# Patient Record
Sex: FEMALE | Race: WHITE | NOT HISPANIC OR LATINO
[De-identification: names, ages, dates, MRNs, and addresses within clinical notes are randomized per-mention and may not be internally consistent; named-entity substitution may affect disease eponyms.]

---

## 2017-06-15 ENCOUNTER — LABORATORY RESULT (OUTPATIENT)
Age: 52
End: 2017-06-15

## 2017-06-15 ENCOUNTER — APPOINTMENT (OUTPATIENT)
Dept: RHEUMATOLOGY | Facility: CLINIC | Age: 52
End: 2017-06-15

## 2017-06-15 VITALS
DIASTOLIC BLOOD PRESSURE: 80 MMHG | SYSTOLIC BLOOD PRESSURE: 122 MMHG | OXYGEN SATURATION: 99 % | TEMPERATURE: 98.3 F | HEART RATE: 65 BPM | HEIGHT: 66 IN

## 2017-06-15 DIAGNOSIS — Z82.61 FAMILY HISTORY OF ARTHRITIS: ICD-10-CM

## 2017-06-15 DIAGNOSIS — Z86.39 PERSONAL HISTORY OF OTHER ENDOCRINE, NUTRITIONAL AND METABOLIC DISEASE: ICD-10-CM

## 2017-06-15 DIAGNOSIS — Z78.9 OTHER SPECIFIED HEALTH STATUS: ICD-10-CM

## 2017-06-15 RX ORDER — OMEPRAZOLE 20 MG/1
20 CAPSULE, DELAYED RELEASE ORAL
Qty: 30 | Refills: 0 | Status: ACTIVE | COMMUNITY
Start: 2017-03-19

## 2017-06-16 LAB
ALBUMIN SERPL ELPH-MCNC: 4.5 G/DL
ALP BLD-CCNC: 54 U/L
ALT SERPL-CCNC: 15 U/L
ANION GAP SERPL CALC-SCNC: 15 MMOL/L
APPEARANCE: ABNORMAL
AST SERPL-CCNC: 17 U/L
BASOPHILS # BLD AUTO: 0.03 K/UL
BASOPHILS NFR BLD AUTO: 0.5 %
BILIRUB SERPL-MCNC: 0.2 MG/DL
BILIRUBIN URINE: NEGATIVE
BLOOD URINE: NEGATIVE
BUN SERPL-MCNC: 22 MG/DL
CALCIUM SERPL-MCNC: 8.9 MG/DL
CCP AB SER IA-ACNC: <8 UNITS
CHLORIDE SERPL-SCNC: 105 MMOL/L
CO2 SERPL-SCNC: 27 MMOL/L
COLOR: YELLOW
CREAT SERPL-MCNC: 0.73 MG/DL
CRP SERPL-MCNC: 0.2 MG/DL
DSDNA AB SER-ACNC: <12 IU/ML
ENA RNP AB SER IA-ACNC: <0.2 AL
ENA SM AB SER IA-ACNC: <0.2 AL
ENA SS-A AB SER IA-ACNC: <0.2 AL
ENA SS-B AB SER IA-ACNC: <0.2 AL
EOSINOPHIL # BLD AUTO: 0.19 K/UL
EOSINOPHIL NFR BLD AUTO: 2.9 %
ERYTHROCYTE [SEDIMENTATION RATE] IN BLOOD BY WESTERGREN METHOD: 12 MM/HR
GLUCOSE QUALITATIVE U: NORMAL MG/DL
GLUCOSE SERPL-MCNC: 85 MG/DL
HBV CORE IGG+IGM SER QL: NONREACTIVE
HBV SURFACE AB SERPL IA-ACNC: <3 MIU/ML
HBV SURFACE AG SER QL: NONREACTIVE
HCT VFR BLD CALC: 41 %
HCV AB SER QL: NONREACTIVE
HCV S/CO RATIO: 0.08 S/CO
HGB BLD-MCNC: 13 G/DL
IMM GRANULOCYTES NFR BLD AUTO: 0 %
KETONES URINE: NEGATIVE
LEUKOCYTE ESTERASE URINE: NEGATIVE
LYMPHOCYTES # BLD AUTO: 1.81 K/UL
LYMPHOCYTES NFR BLD AUTO: 27.5 %
MAN DIFF?: NORMAL
MCHC RBC-ENTMCNC: 28.4 PG
MCHC RBC-ENTMCNC: 31.7 GM/DL
MCV RBC AUTO: 89.7 FL
MONOCYTES # BLD AUTO: 0.43 K/UL
MONOCYTES NFR BLD AUTO: 6.5 %
NEUTROPHILS # BLD AUTO: 4.12 K/UL
NEUTROPHILS NFR BLD AUTO: 62.6 %
NITRITE URINE: NEGATIVE
PH URINE: 5
PLATELET # BLD AUTO: 255 K/UL
POTASSIUM SERPL-SCNC: 4.2 MMOL/L
PROT SERPL-MCNC: 7.5 G/DL
PROTEIN URINE: ABNORMAL MG/DL
RBC # BLD: 4.57 M/UL
RBC # FLD: 16.1 %
RF+CCP IGG SER-IMP: NEGATIVE
RHEUMATOID FACT SER QL: 16 IU/ML
SODIUM SERPL-SCNC: 147 MMOL/L
SPECIFIC GRAVITY URINE: 1.03
UROBILINOGEN URINE: NORMAL MG/DL
WBC # FLD AUTO: 6.58 K/UL

## 2017-06-18 LAB
ADJUSTED MITOGEN: >10 IU/ML
ADJUSTED TB AG: 0.02 IU/ML
ANA SER IF-ACNC: NEGATIVE
C3 SERPL-MCNC: 168 MG/DL
C4 SERPL-MCNC: 27 MG/DL
M TB IFN-G BLD-IMP: NORMAL
QUANTIFERON GOLD NIL: 0.04 IU/ML
THYROGLOB AB SERPL-ACNC: <20 IU/ML
THYROPEROXIDASE AB SERPL IA-ACNC: <10 IU/ML

## 2017-08-22 ENCOUNTER — MEDICATION RENEWAL (OUTPATIENT)
Age: 52
End: 2017-08-22

## 2017-09-14 ENCOUNTER — APPOINTMENT (OUTPATIENT)
Dept: RHEUMATOLOGY | Facility: CLINIC | Age: 52
End: 2017-09-14
Payer: COMMERCIAL

## 2017-09-14 ENCOUNTER — LABORATORY RESULT (OUTPATIENT)
Age: 52
End: 2017-09-14

## 2017-09-14 VITALS
SYSTOLIC BLOOD PRESSURE: 136 MMHG | BODY MASS INDEX: 33.43 KG/M2 | HEIGHT: 66 IN | HEART RATE: 66 BPM | WEIGHT: 208 LBS | DIASTOLIC BLOOD PRESSURE: 87 MMHG | OXYGEN SATURATION: 97 %

## 2017-09-14 PROCEDURE — 99214 OFFICE O/P EST MOD 30 MIN: CPT

## 2017-09-15 LAB
25(OH)D3 SERPL-MCNC: 59.7 NG/ML
ALBUMIN SERPL ELPH-MCNC: 4.5 G/DL
ALP BLD-CCNC: 50 U/L
ALT SERPL-CCNC: 17 U/L
ANION GAP SERPL CALC-SCNC: 17 MMOL/L
APPEARANCE: CLEAR
AST SERPL-CCNC: 24 U/L
BASOPHILS # BLD AUTO: 0.06 K/UL
BASOPHILS NFR BLD AUTO: 0.9 %
BILIRUB SERPL-MCNC: 0.2 MG/DL
BILIRUBIN URINE: NEGATIVE
BLOOD URINE: NEGATIVE
BUN SERPL-MCNC: 25 MG/DL
CALCIUM SERPL-MCNC: 9.8 MG/DL
CHLORIDE SERPL-SCNC: 99 MMOL/L
CO2 SERPL-SCNC: 29 MMOL/L
COLOR: YELLOW
CREAT SERPL-MCNC: 0.74 MG/DL
CREAT SPEC-SCNC: 140 MG/DL
CREAT/PROT UR: 0 RATIO
CRP SERPL-MCNC: 0.2 MG/DL
EOSINOPHIL # BLD AUTO: 0.19 K/UL
EOSINOPHIL NFR BLD AUTO: 2.8 %
ERYTHROCYTE [SEDIMENTATION RATE] IN BLOOD BY WESTERGREN METHOD: 3 MM/HR
GLUCOSE QUALITATIVE U: NORMAL MG/DL
GLUCOSE SERPL-MCNC: 74 MG/DL
HCT VFR BLD CALC: 40.3 %
HGB BLD-MCNC: 12.7 G/DL
IMM GRANULOCYTES NFR BLD AUTO: 0.3 %
KETONES URINE: NEGATIVE
LEUKOCYTE ESTERASE URINE: NEGATIVE
LYMPHOCYTES # BLD AUTO: 1.84 K/UL
LYMPHOCYTES NFR BLD AUTO: 26.7 %
MAN DIFF?: NORMAL
MCHC RBC-ENTMCNC: 28.7 PG
MCHC RBC-ENTMCNC: 31.5 GM/DL
MCV RBC AUTO: 91.2 FL
MONOCYTES # BLD AUTO: 0.53 K/UL
MONOCYTES NFR BLD AUTO: 7.7 %
NEUTROPHILS # BLD AUTO: 4.25 K/UL
NEUTROPHILS NFR BLD AUTO: 61.6 %
NITRITE URINE: NEGATIVE
PH URINE: 5.5
PLATELET # BLD AUTO: 253 K/UL
POTASSIUM SERPL-SCNC: 3.7 MMOL/L
PROT SERPL-MCNC: 7.3 G/DL
PROT UR-MCNC: 6 MG/DL
PROTEIN URINE: NEGATIVE MG/DL
RBC # BLD: 4.42 M/UL
RBC # FLD: 15.6 %
SODIUM SERPL-SCNC: 145 MMOL/L
SPECIFIC GRAVITY URINE: 1.03
UROBILINOGEN URINE: NORMAL MG/DL
WBC # FLD AUTO: 6.89 K/UL

## 2017-09-19 ENCOUNTER — MEDICATION RENEWAL (OUTPATIENT)
Age: 52
End: 2017-09-19

## 2017-11-09 ENCOUNTER — APPOINTMENT (OUTPATIENT)
Dept: MRI IMAGING | Facility: CLINIC | Age: 52
End: 2017-11-09
Payer: COMMERCIAL

## 2017-11-09 ENCOUNTER — OUTPATIENT (OUTPATIENT)
Dept: OUTPATIENT SERVICES | Facility: HOSPITAL | Age: 52
LOS: 1 days | End: 2017-11-09
Payer: COMMERCIAL

## 2017-11-09 DIAGNOSIS — Z00.8 ENCOUNTER FOR OTHER GENERAL EXAMINATION: ICD-10-CM

## 2017-11-09 PROCEDURE — 73721 MRI JNT OF LWR EXTRE W/O DYE: CPT

## 2017-11-09 PROCEDURE — 73721 MRI JNT OF LWR EXTRE W/O DYE: CPT | Mod: 26,RT

## 2017-12-12 ENCOUNTER — APPOINTMENT (OUTPATIENT)
Dept: ORTHOPEDIC SURGERY | Facility: CLINIC | Age: 52
End: 2017-12-12
Payer: COMMERCIAL

## 2017-12-12 ENCOUNTER — APPOINTMENT (OUTPATIENT)
Dept: RHEUMATOLOGY | Facility: CLINIC | Age: 52
End: 2017-12-12
Payer: COMMERCIAL

## 2017-12-12 VITALS
SYSTOLIC BLOOD PRESSURE: 160 MMHG | HEART RATE: 60 BPM | DIASTOLIC BLOOD PRESSURE: 82 MMHG | OXYGEN SATURATION: 98 % | WEIGHT: 210 LBS | BODY MASS INDEX: 33.75 KG/M2 | HEIGHT: 66 IN

## 2017-12-12 DIAGNOSIS — Z78.9 OTHER SPECIFIED HEALTH STATUS: ICD-10-CM

## 2017-12-12 DIAGNOSIS — S73.191A OTHER SPRAIN OF RIGHT HIP, INITIAL ENCOUNTER: ICD-10-CM

## 2017-12-12 DIAGNOSIS — Z86.79 PERSONAL HISTORY OF OTHER DISEASES OF THE CIRCULATORY SYSTEM: ICD-10-CM

## 2017-12-12 DIAGNOSIS — Z87.39 PERSONAL HISTORY OF OTHER DISEASES OF THE MUSCULOSKELETAL SYSTEM AND CONNECTIVE TISSUE: ICD-10-CM

## 2017-12-12 PROCEDURE — 99214 OFFICE O/P EST MOD 30 MIN: CPT

## 2017-12-12 PROCEDURE — 73502 X-RAY EXAM HIP UNI 2-3 VIEWS: CPT | Mod: RT

## 2017-12-12 PROCEDURE — 99203 OFFICE O/P NEW LOW 30 MIN: CPT

## 2017-12-12 RX ORDER — METHOTREXATE 2.5 MG/1
2.5 TABLET ORAL
Qty: 28 | Refills: 0 | Status: DISCONTINUED | COMMUNITY
Start: 2017-05-15 | End: 2017-12-12

## 2017-12-12 RX ORDER — FOLIC ACID 1 MG/1
1 TABLET ORAL
Qty: 90 | Refills: 0 | Status: COMPLETED | COMMUNITY
Start: 2017-03-21 | End: 2017-12-12

## 2017-12-13 LAB
25(OH)D3 SERPL-MCNC: 60.2 NG/ML
ALBUMIN SERPL ELPH-MCNC: 4.4 G/DL
ALP BLD-CCNC: 49 U/L
ALT SERPL-CCNC: 14 U/L
ANION GAP SERPL CALC-SCNC: 17 MMOL/L
AST SERPL-CCNC: 17 U/L
BASOPHILS # BLD AUTO: 0.05 K/UL
BASOPHILS NFR BLD AUTO: 0.7 %
BILIRUB SERPL-MCNC: 0.3 MG/DL
BUN SERPL-MCNC: 24 MG/DL
CALCIUM SERPL-MCNC: 9.8 MG/DL
CHLORIDE SERPL-SCNC: 103 MMOL/L
CO2 SERPL-SCNC: 26 MMOL/L
CREAT SERPL-MCNC: 0.83 MG/DL
CRP SERPL-MCNC: 0.3 MG/DL
EOSINOPHIL # BLD AUTO: 0.22 K/UL
EOSINOPHIL NFR BLD AUTO: 3.2
ERYTHROCYTE [SEDIMENTATION RATE] IN BLOOD BY WESTERGREN METHOD: 11 MM/HR
GLUCOSE SERPL-MCNC: 94 MG/DL
HCT VFR BLD CALC: 41.9 %
HGB BLD-MCNC: 13.3 G/DL
IMM GRANULOCYTES NFR BLD AUTO: 0.1 %
LYMPHOCYTES # BLD AUTO: 2.01 K/UL
LYMPHOCYTES NFR BLD AUTO: 29.6 %
MAN DIFF?: NORMAL
MCHC RBC-ENTMCNC: 28.7 PG
MCHC RBC-ENTMCNC: 31.7 GM/DL
MCV RBC AUTO: 90.5 FL
MONOCYTES # BLD AUTO: 0.41 K/UL
MONOCYTES NFR BLD AUTO: 6 %
NEUTROPHILS # BLD AUTO: 4.1 K/UL
NEUTROPHILS NFR BLD AUTO: 60.4 %
PLATELET # BLD AUTO: 262 K/UL
POTASSIUM SERPL-SCNC: 3.9 MMOL/L
PROT SERPL-MCNC: 7.2 G/DL
RBC # BLD: 4.63 M/UL
RBC # FLD: 15.6 %
SODIUM SERPL-SCNC: 146 MMOL/L
WBC # FLD AUTO: 6.8 K/UL

## 2017-12-14 ENCOUNTER — MEDICATION RENEWAL (OUTPATIENT)
Age: 52
End: 2017-12-14

## 2017-12-14 ENCOUNTER — APPOINTMENT (OUTPATIENT)
Dept: RHEUMATOLOGY | Facility: CLINIC | Age: 52
End: 2017-12-14

## 2017-12-15 ENCOUNTER — APPOINTMENT (OUTPATIENT)
Dept: ORTHOPEDIC SURGERY | Facility: CLINIC | Age: 52
End: 2017-12-15
Payer: COMMERCIAL

## 2017-12-15 VITALS
HEIGHT: 66 IN | BODY MASS INDEX: 33.75 KG/M2 | WEIGHT: 210 LBS | HEART RATE: 66 BPM | DIASTOLIC BLOOD PRESSURE: 91 MMHG | SYSTOLIC BLOOD PRESSURE: 148 MMHG

## 2017-12-15 PROCEDURE — 99214 OFFICE O/P EST MOD 30 MIN: CPT

## 2018-02-15 ENCOUNTER — APPOINTMENT (OUTPATIENT)
Dept: RHEUMATOLOGY | Facility: CLINIC | Age: 53
End: 2018-02-15
Payer: COMMERCIAL

## 2018-02-15 VITALS
WEIGHT: 208 LBS | DIASTOLIC BLOOD PRESSURE: 87 MMHG | HEIGHT: 66 IN | SYSTOLIC BLOOD PRESSURE: 127 MMHG | HEART RATE: 70 BPM | BODY MASS INDEX: 33.43 KG/M2 | OXYGEN SATURATION: 99 %

## 2018-02-15 PROCEDURE — 99214 OFFICE O/P EST MOD 30 MIN: CPT

## 2018-02-15 RX ORDER — OMEPRAZOLE 40 MG/1
40 CAPSULE, DELAYED RELEASE ORAL
Qty: 30 | Refills: 0 | Status: DISCONTINUED | COMMUNITY
Start: 2017-03-21 | End: 2018-02-15

## 2018-02-21 ENCOUNTER — FORM ENCOUNTER (OUTPATIENT)
Age: 53
End: 2018-02-21

## 2018-02-22 ENCOUNTER — APPOINTMENT (OUTPATIENT)
Dept: RADIOLOGY | Facility: CLINIC | Age: 53
End: 2018-02-22
Payer: COMMERCIAL

## 2018-02-22 ENCOUNTER — OUTPATIENT (OUTPATIENT)
Dept: OUTPATIENT SERVICES | Facility: HOSPITAL | Age: 53
LOS: 1 days | End: 2018-02-22
Payer: COMMERCIAL

## 2018-02-22 DIAGNOSIS — Z00.8 ENCOUNTER FOR OTHER GENERAL EXAMINATION: ICD-10-CM

## 2018-02-22 DIAGNOSIS — M06.9 RHEUMATOID ARTHRITIS, UNSPECIFIED: ICD-10-CM

## 2018-02-22 LAB
25(OH)D3 SERPL-MCNC: 52.2 NG/ML
ALBUMIN SERPL ELPH-MCNC: 4.2 G/DL
ALP BLD-CCNC: 48 U/L
ALT SERPL-CCNC: 13 U/L
ANION GAP SERPL CALC-SCNC: 15 MMOL/L
AST SERPL-CCNC: 20 U/L
BASOPHILS # BLD AUTO: 0.03 K/UL
BASOPHILS NFR BLD AUTO: 0.4 %
BILIRUB SERPL-MCNC: 0.3 MG/DL
BUN SERPL-MCNC: 23 MG/DL
CALCIUM SERPL-MCNC: 9.7 MG/DL
CHLORIDE SERPL-SCNC: 103 MMOL/L
CO2 SERPL-SCNC: 26 MMOL/L
CREAT SERPL-MCNC: 0.85 MG/DL
CRP SERPL-MCNC: 0.2 MG/DL
EOSINOPHIL # BLD AUTO: 0.24 K/UL
EOSINOPHIL NFR BLD AUTO: 3.5 %
ERYTHROCYTE [SEDIMENTATION RATE] IN BLOOD BY WESTERGREN METHOD: 19 MM/HR
GLUCOSE SERPL-MCNC: 87 MG/DL
HCT VFR BLD CALC: 39.3 %
HGB BLD-MCNC: 12.9 G/DL
IMM GRANULOCYTES NFR BLD AUTO: 0.1 %
LYMPHOCYTES # BLD AUTO: 1.93 K/UL
LYMPHOCYTES NFR BLD AUTO: 28.3 %
MAN DIFF?: NORMAL
MCHC RBC-ENTMCNC: 29.3 PG
MCHC RBC-ENTMCNC: 32.8 GM/DL
MCV RBC AUTO: 89.1 FL
MONOCYTES # BLD AUTO: 0.46 K/UL
MONOCYTES NFR BLD AUTO: 6.8 %
NEUTROPHILS # BLD AUTO: 4.14 K/UL
NEUTROPHILS NFR BLD AUTO: 60.9 %
PLATELET # BLD AUTO: 259 K/UL
POTASSIUM SERPL-SCNC: 4.1 MMOL/L
PROT SERPL-MCNC: 7.1 G/DL
RBC # BLD: 4.41 M/UL
RBC # FLD: 15.3 %
SODIUM SERPL-SCNC: 144 MMOL/L
WBC # FLD AUTO: 6.81 K/UL

## 2018-02-22 PROCEDURE — 72040 X-RAY EXAM NECK SPINE 2-3 VW: CPT | Mod: 26

## 2018-02-22 PROCEDURE — 72040 X-RAY EXAM NECK SPINE 2-3 VW: CPT

## 2018-02-27 ENCOUNTER — OUTPATIENT (OUTPATIENT)
Dept: OUTPATIENT SERVICES | Facility: HOSPITAL | Age: 53
LOS: 1 days | End: 2018-02-27
Payer: COMMERCIAL

## 2018-02-27 VITALS
OXYGEN SATURATION: 98 % | DIASTOLIC BLOOD PRESSURE: 76 MMHG | HEART RATE: 69 BPM | WEIGHT: 205.91 LBS | SYSTOLIC BLOOD PRESSURE: 110 MMHG | TEMPERATURE: 98 F | HEIGHT: 65 IN | RESPIRATION RATE: 16 BRPM

## 2018-02-27 DIAGNOSIS — K43.9 VENTRAL HERNIA WITHOUT OBSTRUCTION OR GANGRENE: Chronic | ICD-10-CM

## 2018-02-27 DIAGNOSIS — M16.11 UNILATERAL PRIMARY OSTEOARTHRITIS, RIGHT HIP: ICD-10-CM

## 2018-02-27 DIAGNOSIS — Z90.49 ACQUIRED ABSENCE OF OTHER SPECIFIED PARTS OF DIGESTIVE TRACT: Chronic | ICD-10-CM

## 2018-02-27 DIAGNOSIS — Z98.890 OTHER SPECIFIED POSTPROCEDURAL STATES: Chronic | ICD-10-CM

## 2018-02-27 DIAGNOSIS — M06.9 RHEUMATOID ARTHRITIS, UNSPECIFIED: ICD-10-CM

## 2018-02-27 LAB
ALBUMIN SERPL ELPH-MCNC: 4.4 G/DL — SIGNIFICANT CHANGE UP (ref 3.3–5)
ALP SERPL-CCNC: 48 U/L — SIGNIFICANT CHANGE UP (ref 40–120)
ALT FLD-CCNC: 19 U/L — SIGNIFICANT CHANGE UP (ref 4–33)
APPEARANCE UR: SIGNIFICANT CHANGE UP
AST SERPL-CCNC: 19 U/L — SIGNIFICANT CHANGE UP (ref 4–32)
BACTERIA # UR AUTO: SIGNIFICANT CHANGE UP
BILIRUB SERPL-MCNC: 0.3 MG/DL — SIGNIFICANT CHANGE UP (ref 0.2–1.2)
BILIRUB UR-MCNC: NEGATIVE — SIGNIFICANT CHANGE UP
BLD GP AB SCN SERPL QL: NEGATIVE — SIGNIFICANT CHANGE UP
BLOOD UR QL VISUAL: NEGATIVE — SIGNIFICANT CHANGE UP
BUN SERPL-MCNC: 24 MG/DL — HIGH (ref 7–23)
CALCIUM SERPL-MCNC: 9.3 MG/DL — SIGNIFICANT CHANGE UP (ref 8.4–10.5)
CHLORIDE SERPL-SCNC: 94 MMOL/L — LOW (ref 98–107)
CO2 SERPL-SCNC: 29 MMOL/L — SIGNIFICANT CHANGE UP (ref 22–31)
COLOR SPEC: SIGNIFICANT CHANGE UP
CREAT SERPL-MCNC: 0.78 MG/DL — SIGNIFICANT CHANGE UP (ref 0.5–1.3)
GLUCOSE SERPL-MCNC: 69 MG/DL — LOW (ref 70–99)
GLUCOSE UR-MCNC: NEGATIVE — SIGNIFICANT CHANGE UP
HBA1C BLD-MCNC: 5.8 % — HIGH (ref 4–5.6)
HCG SERPL-ACNC: < 5 MIU/ML — SIGNIFICANT CHANGE UP
HCT VFR BLD CALC: 40.9 % — SIGNIFICANT CHANGE UP (ref 34.5–45)
HGB BLD-MCNC: 13.7 G/DL — SIGNIFICANT CHANGE UP (ref 11.5–15.5)
KETONES UR-MCNC: NEGATIVE — SIGNIFICANT CHANGE UP
LEUKOCYTE ESTERASE UR-ACNC: NEGATIVE — SIGNIFICANT CHANGE UP
MCHC RBC-ENTMCNC: 29.8 PG — SIGNIFICANT CHANGE UP (ref 27–34)
MCHC RBC-ENTMCNC: 33.5 % — SIGNIFICANT CHANGE UP (ref 32–36)
MCV RBC AUTO: 88.9 FL — SIGNIFICANT CHANGE UP (ref 80–100)
NITRITE UR-MCNC: NEGATIVE — SIGNIFICANT CHANGE UP
NRBC # FLD: 0 — SIGNIFICANT CHANGE UP
PH UR: 7 — SIGNIFICANT CHANGE UP (ref 4.6–8)
PLATELET # BLD AUTO: 242 K/UL — SIGNIFICANT CHANGE UP (ref 150–400)
PMV BLD: 10.4 FL — SIGNIFICANT CHANGE UP (ref 7–13)
POTASSIUM SERPL-MCNC: 4.4 MMOL/L — SIGNIFICANT CHANGE UP (ref 3.5–5.3)
POTASSIUM SERPL-SCNC: 4.4 MMOL/L — SIGNIFICANT CHANGE UP (ref 3.5–5.3)
PROT SERPL-MCNC: 7.3 G/DL — SIGNIFICANT CHANGE UP (ref 6–8.3)
PROT UR-MCNC: 20 MG/DL — SIGNIFICANT CHANGE UP
RBC # BLD: 4.6 M/UL — SIGNIFICANT CHANGE UP (ref 3.8–5.2)
RBC # FLD: 14.6 % — HIGH (ref 10.3–14.5)
RBC CASTS # UR COMP ASSIST: SIGNIFICANT CHANGE UP (ref 0–?)
RH IG SCN BLD-IMP: POSITIVE — SIGNIFICANT CHANGE UP
SODIUM SERPL-SCNC: 137 MMOL/L — SIGNIFICANT CHANGE UP (ref 135–145)
SP GR SPEC: 1.02 — SIGNIFICANT CHANGE UP (ref 1–1.04)
SQUAMOUS # UR AUTO: SIGNIFICANT CHANGE UP
UROBILINOGEN FLD QL: NORMAL MG/DL — SIGNIFICANT CHANGE UP
WBC # BLD: 5.94 K/UL — SIGNIFICANT CHANGE UP (ref 3.8–10.5)
WBC # FLD AUTO: 5.94 K/UL — SIGNIFICANT CHANGE UP (ref 3.8–10.5)
WBC UR QL: SIGNIFICANT CHANGE UP (ref 0–?)

## 2018-02-27 PROCEDURE — 93010 ELECTROCARDIOGRAM REPORT: CPT

## 2018-02-27 RX ORDER — ACETAMINOPHEN 500 MG
975 TABLET ORAL ONCE
Qty: 0 | Refills: 0 | Status: COMPLETED | OUTPATIENT
Start: 2018-03-13 | End: 2018-03-13

## 2018-02-27 RX ORDER — SODIUM CHLORIDE 9 MG/ML
3 INJECTION INTRAMUSCULAR; INTRAVENOUS; SUBCUTANEOUS EVERY 8 HOURS
Qty: 0 | Refills: 0 | Status: DISCONTINUED | OUTPATIENT
Start: 2018-03-13 | End: 2018-03-14

## 2018-02-27 RX ORDER — TRAMADOL HYDROCHLORIDE 50 MG/1
50 TABLET ORAL ONCE
Qty: 0 | Refills: 0 | Status: DISCONTINUED | OUTPATIENT
Start: 2018-03-13 | End: 2018-03-13

## 2018-02-27 RX ORDER — SODIUM CHLORIDE 9 MG/ML
1000 INJECTION, SOLUTION INTRAVENOUS
Qty: 0 | Refills: 0 | Status: DISCONTINUED | OUTPATIENT
Start: 2018-03-13 | End: 2018-03-13

## 2018-02-27 NOTE — H&P PST ADULT - PMH
Anemia  iron deficiency  Diverticulitis  s/p surgery ~8 years ago  GERD (gastroesophageal reflux disease)    Herniated disc, cervical    HTN (hypertension)    HTN (hypertension)    Lumbar herniated disc    Neuropathy    Rheumatoid arthritis    Unilateral primary osteoarthritis, right hip

## 2018-02-27 NOTE — H&P PST ADULT - RS GEN PE MLT RESP DETAILS PC
clear to auscultation bilaterally/no chest wall tenderness/airway patent/breath sounds equal/respirations non-labored/no rales/good air movement/no rhonchi/no wheezes/no intercostal retractions

## 2018-02-27 NOTE — H&P PST ADULT - NEGATIVE NEUROLOGICAL SYMPTOMS
no headache/no vertigo/no syncope/no tremors/no facial palsy/no transient paralysis/no weakness/no focal seizures/no generalized seizures/no loss of sensation/no hemiparesis

## 2018-02-27 NOTE — H&P PST ADULT - PROBLEM SELECTOR PLAN 1
Pt is scheduled for right total hip replacement direct anterior approach for 3/13/18. Preop instructions, surgical scrub and urine cup for pregnancy test on day of procedure provided. Will take own omeprazole on the morning of procedure. Pt stated understanding. Pending medical evaluation due to Hx of HTN Diverticulosis anemia, hypernatremia (as per rheumatologist) has appt on 2/28/18.

## 2018-02-27 NOTE — H&P PST ADULT - NEGATIVE ENMT SYMPTOMS
no throat pain/no nose bleeds/no tinnitus/no hearing difficulty/no ear pain/no dysphagia/no sinus symptoms

## 2018-02-27 NOTE — H&P PST ADULT - HISTORY OF PRESENT ILLNESS
52 year old female presents to presurgical testing with diagnosis of unilateral primary osteoarthritis, right hip scheduled for right total hip replacement direct anterior approach for 3/13/18. Pt with Hx of RA, and osteoarthritis complaining of limping for years, with increasing pain and dysfunction of right hip, over last year. Pain is severe and persistent despite conservative  management. Right hip X-ray completed and referred for surgical intervention.

## 2018-02-27 NOTE — H&P PST ADULT - NEGATIVE OPHTHALMOLOGIC SYMPTOMS
no discharge L/no diplopia/no photophobia/no pain L/no blurred vision L/no pain R/no loss of vision L/no loss of vision R/no blurred vision R

## 2018-02-27 NOTE — H&P PST ADULT - PROBLEM SELECTOR PLAN 2
Controlled on medication. Rheumatology evaluation with C-spine Xray in chart. Last Methotrexate dose on 2/23/18

## 2018-02-27 NOTE — H&P PST ADULT - PSH
Hernia, ventral  repair  History of colon resection  ~8 years ago, with colostomy, later reversed  S/P cholecystectomy

## 2018-02-28 LAB
BACTERIA UR CULT: SIGNIFICANT CHANGE UP
SPECIMEN SOURCE: SIGNIFICANT CHANGE UP
SPECIMEN SOURCE: SIGNIFICANT CHANGE UP

## 2018-03-01 ENCOUNTER — OTHER (OUTPATIENT)
Age: 53
End: 2018-03-01

## 2018-03-01 DIAGNOSIS — Z87.440 PERSONAL HISTORY OF URINARY (TRACT) INFECTIONS: ICD-10-CM

## 2018-03-01 LAB — BACTERIA NPH CULT: SIGNIFICANT CHANGE UP

## 2018-03-06 ENCOUNTER — OTHER (OUTPATIENT)
Age: 53
End: 2018-03-06

## 2018-03-13 ENCOUNTER — APPOINTMENT (OUTPATIENT)
Dept: ORTHOPEDIC SURGERY | Facility: HOSPITAL | Age: 53
End: 2018-03-13

## 2018-03-13 ENCOUNTER — RESULT REVIEW (OUTPATIENT)
Age: 53
End: 2018-03-13

## 2018-03-13 ENCOUNTER — TRANSCRIPTION ENCOUNTER (OUTPATIENT)
Age: 53
End: 2018-03-13

## 2018-03-13 ENCOUNTER — INPATIENT (INPATIENT)
Facility: HOSPITAL | Age: 53
LOS: 0 days | Discharge: HOME HEALTH | End: 2018-03-14
Attending: ORTHOPAEDIC SURGERY | Admitting: ORTHOPAEDIC SURGERY
Payer: COMMERCIAL

## 2018-03-13 VITALS
HEIGHT: 65 IN | HEART RATE: 72 BPM | OXYGEN SATURATION: 97 % | TEMPERATURE: 98 F | RESPIRATION RATE: 16 BRPM | WEIGHT: 205.91 LBS | SYSTOLIC BLOOD PRESSURE: 152 MMHG | DIASTOLIC BLOOD PRESSURE: 84 MMHG

## 2018-03-13 DIAGNOSIS — K43.9 VENTRAL HERNIA WITHOUT OBSTRUCTION OR GANGRENE: Chronic | ICD-10-CM

## 2018-03-13 DIAGNOSIS — M16.11 UNILATERAL PRIMARY OSTEOARTHRITIS, RIGHT HIP: ICD-10-CM

## 2018-03-13 DIAGNOSIS — Z90.49 ACQUIRED ABSENCE OF OTHER SPECIFIED PARTS OF DIGESTIVE TRACT: Chronic | ICD-10-CM

## 2018-03-13 DIAGNOSIS — Z98.890 OTHER SPECIFIED POSTPROCEDURAL STATES: Chronic | ICD-10-CM

## 2018-03-13 LAB
BUN SERPL-MCNC: 17 MG/DL — SIGNIFICANT CHANGE UP (ref 7–23)
CALCIUM SERPL-MCNC: 8.6 MG/DL — SIGNIFICANT CHANGE UP (ref 8.4–10.5)
CHLORIDE SERPL-SCNC: 100 MMOL/L — SIGNIFICANT CHANGE UP (ref 98–107)
CO2 SERPL-SCNC: 30 MMOL/L — SIGNIFICANT CHANGE UP (ref 22–31)
CREAT SERPL-MCNC: 0.71 MG/DL — SIGNIFICANT CHANGE UP (ref 0.5–1.3)
GLUCOSE BLDC GLUCOMTR-MCNC: 98 MG/DL — SIGNIFICANT CHANGE UP (ref 70–99)
GLUCOSE SERPL-MCNC: 143 MG/DL — HIGH (ref 70–99)
HCG UR QL: NEGATIVE — SIGNIFICANT CHANGE UP
HCT VFR BLD CALC: 38 % — SIGNIFICANT CHANGE UP (ref 34.5–45)
HGB BLD-MCNC: 12.3 G/DL — SIGNIFICANT CHANGE UP (ref 11.5–15.5)
MCHC RBC-ENTMCNC: 28.7 PG — SIGNIFICANT CHANGE UP (ref 27–34)
MCHC RBC-ENTMCNC: 32.4 % — SIGNIFICANT CHANGE UP (ref 32–36)
MCV RBC AUTO: 88.6 FL — SIGNIFICANT CHANGE UP (ref 80–100)
NRBC # FLD: 0 — SIGNIFICANT CHANGE UP
PLATELET # BLD AUTO: 250 K/UL — SIGNIFICANT CHANGE UP (ref 150–400)
PMV BLD: 10.2 FL — SIGNIFICANT CHANGE UP (ref 7–13)
POTASSIUM SERPL-MCNC: 4 MMOL/L — SIGNIFICANT CHANGE UP (ref 3.5–5.3)
POTASSIUM SERPL-SCNC: 4 MMOL/L — SIGNIFICANT CHANGE UP (ref 3.5–5.3)
RBC # BLD: 4.29 M/UL — SIGNIFICANT CHANGE UP (ref 3.8–5.2)
RBC # FLD: 14.4 % — SIGNIFICANT CHANGE UP (ref 10.3–14.5)
RH IG SCN BLD-IMP: POSITIVE — SIGNIFICANT CHANGE UP
SODIUM SERPL-SCNC: 141 MMOL/L — SIGNIFICANT CHANGE UP (ref 135–145)
WBC # BLD: 11.73 K/UL — HIGH (ref 3.8–10.5)
WBC # FLD AUTO: 11.73 K/UL — HIGH (ref 3.8–10.5)

## 2018-03-13 PROCEDURE — 73501 X-RAY EXAM HIP UNI 1 VIEW: CPT | Mod: 26,RT

## 2018-03-13 PROCEDURE — 88304 TISSUE EXAM BY PATHOLOGIST: CPT | Mod: 26

## 2018-03-13 PROCEDURE — 27130 TOTAL HIP ARTHROPLASTY: CPT | Mod: RT

## 2018-03-13 PROCEDURE — 88311 DECALCIFY TISSUE: CPT | Mod: 26

## 2018-03-13 RX ORDER — TRAMADOL HYDROCHLORIDE 50 MG/1
50 TABLET ORAL EVERY 8 HOURS
Qty: 0 | Refills: 0 | Status: DISCONTINUED | OUTPATIENT
Start: 2018-03-13 | End: 2018-03-14

## 2018-03-13 RX ORDER — MORPHINE SULFATE 50 MG/1
4 CAPSULE, EXTENDED RELEASE ORAL ONCE
Qty: 0 | Refills: 0 | Status: DISCONTINUED | OUTPATIENT
Start: 2018-03-13 | End: 2018-03-13

## 2018-03-13 RX ORDER — ONDANSETRON 8 MG/1
4 TABLET, FILM COATED ORAL EVERY 6 HOURS
Qty: 0 | Refills: 0 | Status: DISCONTINUED | OUTPATIENT
Start: 2018-03-13 | End: 2018-03-13

## 2018-03-13 RX ORDER — FOLIC ACID 0.8 MG
1 TABLET ORAL DAILY
Qty: 0 | Refills: 0 | Status: DISCONTINUED | OUTPATIENT
Start: 2018-03-14 | End: 2018-03-14

## 2018-03-13 RX ORDER — DEXAMETHASONE 0.5 MG/5ML
10 ELIXIR ORAL ONCE
Qty: 0 | Refills: 0 | Status: COMPLETED | OUTPATIENT
Start: 2018-03-14 | End: 2018-03-14

## 2018-03-13 RX ORDER — ACETAMINOPHEN 500 MG
650 TABLET ORAL EVERY 6 HOURS
Qty: 0 | Refills: 0 | Status: DISCONTINUED | OUTPATIENT
Start: 2018-03-13 | End: 2018-03-14

## 2018-03-13 RX ORDER — CEFAZOLIN SODIUM 1 G
2000 VIAL (EA) INJECTION EVERY 8 HOURS
Qty: 0 | Refills: 0 | Status: COMPLETED | OUTPATIENT
Start: 2018-03-13 | End: 2018-03-14

## 2018-03-13 RX ORDER — PANTOPRAZOLE SODIUM 20 MG/1
40 TABLET, DELAYED RELEASE ORAL DAILY
Qty: 0 | Refills: 0 | Status: DISCONTINUED | OUTPATIENT
Start: 2018-03-13 | End: 2018-03-14

## 2018-03-13 RX ORDER — GABAPENTIN 400 MG/1
300 CAPSULE ORAL ONCE
Qty: 0 | Refills: 0 | Status: DISCONTINUED | OUTPATIENT
Start: 2018-03-13 | End: 2018-03-13

## 2018-03-13 RX ORDER — ASPIRIN/CALCIUM CARB/MAGNESIUM 324 MG
325 TABLET ORAL
Qty: 0 | Refills: 0 | Status: DISCONTINUED | OUTPATIENT
Start: 2018-03-13 | End: 2018-03-14

## 2018-03-13 RX ORDER — GABAPENTIN 400 MG/1
300 CAPSULE ORAL DAILY
Qty: 0 | Refills: 0 | Status: DISCONTINUED | OUTPATIENT
Start: 2018-03-13 | End: 2018-03-14

## 2018-03-13 RX ORDER — TRAMADOL HYDROCHLORIDE 50 MG/1
50 TABLET ORAL EVERY 8 HOURS
Qty: 0 | Refills: 0 | Status: DISCONTINUED | OUTPATIENT
Start: 2018-03-13 | End: 2018-03-13

## 2018-03-13 RX ORDER — SENNA PLUS 8.6 MG/1
2 TABLET ORAL AT BEDTIME
Qty: 0 | Refills: 0 | Status: DISCONTINUED | OUTPATIENT
Start: 2018-03-13 | End: 2018-03-14

## 2018-03-13 RX ORDER — OXYCODONE HYDROCHLORIDE 5 MG/1
5 TABLET ORAL EVERY 4 HOURS
Qty: 0 | Refills: 0 | Status: DISCONTINUED | OUTPATIENT
Start: 2018-03-13 | End: 2018-03-14

## 2018-03-13 RX ORDER — FERROUS SULFATE 325(65) MG
325 TABLET ORAL DAILY
Qty: 0 | Refills: 0 | Status: DISCONTINUED | OUTPATIENT
Start: 2018-03-14 | End: 2018-03-14

## 2018-03-13 RX ORDER — SODIUM CHLORIDE 9 MG/ML
1000 INJECTION INTRAMUSCULAR; INTRAVENOUS; SUBCUTANEOUS ONCE
Qty: 0 | Refills: 0 | Status: COMPLETED | OUTPATIENT
Start: 2018-03-14 | End: 2018-03-14

## 2018-03-13 RX ORDER — MORPHINE SULFATE 50 MG/1
2 CAPSULE, EXTENDED RELEASE ORAL EVERY 4 HOURS
Qty: 0 | Refills: 0 | Status: DISCONTINUED | OUTPATIENT
Start: 2018-03-13 | End: 2018-03-14

## 2018-03-13 RX ORDER — POLYETHYLENE GLYCOL 3350 17 G/17G
17 POWDER, FOR SOLUTION ORAL DAILY
Qty: 0 | Refills: 0 | Status: DISCONTINUED | OUTPATIENT
Start: 2018-03-13 | End: 2018-03-14

## 2018-03-13 RX ORDER — SODIUM CHLORIDE 9 MG/ML
1000 INJECTION, SOLUTION INTRAVENOUS
Qty: 0 | Refills: 0 | Status: DISCONTINUED | OUTPATIENT
Start: 2018-03-13 | End: 2018-03-14

## 2018-03-13 RX ORDER — KETOROLAC TROMETHAMINE 30 MG/ML
15 SYRINGE (ML) INJECTION EVERY 6 HOURS
Qty: 0 | Refills: 0 | Status: DISCONTINUED | OUTPATIENT
Start: 2018-03-13 | End: 2018-03-14

## 2018-03-13 RX ORDER — METHOTREXATE 2.5 MG/1
17.5 TABLET ORAL
Qty: 0 | Refills: 0 | Status: DISCONTINUED | OUTPATIENT
Start: 2018-03-13 | End: 2018-03-14

## 2018-03-13 RX ORDER — GABAPENTIN 400 MG/1
300 CAPSULE ORAL ONCE
Qty: 0 | Refills: 0 | Status: COMPLETED | OUTPATIENT
Start: 2018-03-13 | End: 2018-03-13

## 2018-03-13 RX ORDER — CELECOXIB 200 MG/1
200 CAPSULE ORAL
Qty: 0 | Refills: 0 | Status: DISCONTINUED | OUTPATIENT
Start: 2018-03-15 | End: 2018-03-14

## 2018-03-13 RX ORDER — SODIUM CHLORIDE 9 MG/ML
1000 INJECTION INTRAMUSCULAR; INTRAVENOUS; SUBCUTANEOUS ONCE
Qty: 0 | Refills: 0 | Status: COMPLETED | OUTPATIENT
Start: 2018-03-13 | End: 2018-03-13

## 2018-03-13 RX ORDER — HYDROCHLOROTHIAZIDE 25 MG
25 TABLET ORAL DAILY
Qty: 0 | Refills: 0 | Status: DISCONTINUED | OUTPATIENT
Start: 2018-03-13 | End: 2018-03-14

## 2018-03-13 RX ORDER — GABAPENTIN 400 MG/1
600 CAPSULE ORAL AT BEDTIME
Qty: 0 | Refills: 0 | Status: DISCONTINUED | OUTPATIENT
Start: 2018-03-13 | End: 2018-03-14

## 2018-03-13 RX ORDER — OXYCODONE HYDROCHLORIDE 5 MG/1
10 TABLET ORAL EVERY 4 HOURS
Qty: 0 | Refills: 0 | Status: DISCONTINUED | OUTPATIENT
Start: 2018-03-13 | End: 2018-03-14

## 2018-03-13 RX ORDER — DOCUSATE SODIUM 100 MG
100 CAPSULE ORAL THREE TIMES A DAY
Qty: 0 | Refills: 0 | Status: DISCONTINUED | OUTPATIENT
Start: 2018-03-13 | End: 2018-03-14

## 2018-03-13 RX ADMIN — TRAMADOL HYDROCHLORIDE 50 MILLIGRAM(S): 50 TABLET ORAL at 13:46

## 2018-03-13 RX ADMIN — SODIUM CHLORIDE 150 MILLILITER(S): 9 INJECTION, SOLUTION INTRAVENOUS at 10:00

## 2018-03-13 RX ADMIN — SODIUM CHLORIDE 30 MILLILITER(S): 9 INJECTION, SOLUTION INTRAVENOUS at 07:01

## 2018-03-13 RX ADMIN — Medication 100 MILLIGRAM(S): at 22:04

## 2018-03-13 RX ADMIN — Medication 975 MILLIGRAM(S): at 07:00

## 2018-03-13 RX ADMIN — TRAMADOL HYDROCHLORIDE 50 MILLIGRAM(S): 50 TABLET ORAL at 07:00

## 2018-03-13 RX ADMIN — Medication 650 MILLIGRAM(S): at 18:53

## 2018-03-13 RX ADMIN — GABAPENTIN 600 MILLIGRAM(S): 400 CAPSULE ORAL at 22:03

## 2018-03-13 RX ADMIN — SODIUM CHLORIDE 3 MILLILITER(S): 9 INJECTION INTRAMUSCULAR; INTRAVENOUS; SUBCUTANEOUS at 13:44

## 2018-03-13 RX ADMIN — SODIUM CHLORIDE 1000 MILLILITER(S): 9 INJECTION INTRAMUSCULAR; INTRAVENOUS; SUBCUTANEOUS at 12:07

## 2018-03-13 RX ADMIN — Medication 100 MILLIGRAM(S): at 13:38

## 2018-03-13 RX ADMIN — SODIUM CHLORIDE 150 MILLILITER(S): 9 INJECTION, SOLUTION INTRAVENOUS at 12:07

## 2018-03-13 RX ADMIN — Medication 325 MILLIGRAM(S): at 18:53

## 2018-03-13 RX ADMIN — Medication 100 MILLIGRAM(S): at 16:41

## 2018-03-13 RX ADMIN — Medication 15 MILLIGRAM(S): at 18:53

## 2018-03-13 RX ADMIN — SENNA PLUS 2 TABLET(S): 8.6 TABLET ORAL at 22:02

## 2018-03-13 RX ADMIN — GABAPENTIN 300 MILLIGRAM(S): 400 CAPSULE ORAL at 07:25

## 2018-03-13 RX ADMIN — SODIUM CHLORIDE 3 MILLILITER(S): 9 INJECTION INTRAMUSCULAR; INTRAVENOUS; SUBCUTANEOUS at 21:40

## 2018-03-13 RX ADMIN — Medication 650 MILLIGRAM(S): at 13:38

## 2018-03-13 RX ADMIN — ONDANSETRON 4 MILLIGRAM(S): 8 TABLET, FILM COATED ORAL at 16:41

## 2018-03-13 RX ADMIN — SODIUM CHLORIDE 150 MILLILITER(S): 9 INJECTION, SOLUTION INTRAVENOUS at 22:02

## 2018-03-13 NOTE — DISCHARGE NOTE ADULT - CARE PROVIDER_API CALL
Juliano Jones), Orthopaedic Surgery  [address] [address]  Phone: [phone]  Fax: [phone]

## 2018-03-13 NOTE — OCCUPATIONAL THERAPY INITIAL EVALUATION ADULT - MD ORDER
Occupational Therapy (OT) to evaluate and treat. Occupational Therapy (OT) to evaluate and treat. Out of bed to Chair. Ambulate as Tolerated. Ambulate with walker. Per ANJALI Langston, pt is okay to participate in OT evaluation and perform activity as tolerated.

## 2018-03-13 NOTE — OCCUPATIONAL THERAPY INITIAL EVALUATION ADULT - PERTINENT HX OF CURRENT PROBLEM, REHAB EVAL
Pt is a 52 year old female with hx of RA, & osteoarthritis complaining of limping for years, with increasing pain & dysfunction of right hip, over last year. Pain is severe & persistent despite conservative  management. Right hip X-ray completed. Pt with diagnosis of unilateral primary osteoarthritis, right hip. Pt is now s/p right total hip replacement direct anterior approach on 3/13/18.

## 2018-03-13 NOTE — DISCHARGE NOTE ADULT - MEDICATION SUMMARY - MEDICATIONS TO TAKE
I will START or STAY ON the medications listed below when I get home from the hospital:    Mobic 15 mg oral tablet  -- 1 tab(s) by mouth once a day take with food  -- Do not take this drug if you are pregnant.  Obtain medical advice before taking any non-prescription drugs as some may affect the action of this medication.  Take with food or milk.    -- Indication: For Anti inflammatory    Percocet 5/325 oral tablet  -- 1-2 tab(s) by mouth every 6 hours as needed for moderate to severe pain  begin tapering off as pain decreases  MDD:EIGHT TABS  -- Caution federal law prohibits the transfer of this drug to any person other  than the person for whom it was prescribed.  May cause drowsiness.  Alcohol may intensify this effect.  Use care when operating dangerous machinery.  This prescription cannot be refilled.  This product contains acetaminophen.  Do not use  with any other product containing acetaminophen to prevent possible liver damage.  Using more of this medication than prescribed may cause serious breathing problems.    -- Indication: For Pain control    traMADol 50 mg oral tablet  -- 1 tab(s) by mouth every 8 hours   take regularly (baseline control of mild pain)  begin tapering off as pain decreases  MDD:THREE TABS  -- Indication: For Pain control    aspirin 325 mg oral delayed release tablet  -- 1 tab(s) by mouth 2 times a day (with meals)   -- Indication: For blood thinner MUST TAKE WITH FOOD    gabapentin 300 mg oral tablet  -- 1  by mouth once a day  -- Indication: For Home med    gabapentin 300 mg oral tablet  -- 2 tab(s) by mouth once a day (at bedtime)  -- Indication: For Home med    methotrexate 2.5 mg oral tablet  -- 7 tab(s) by mouth weekly on Friday  Last dose 2/23/18.  -- Indication: For Home med - hold off until Dr. Jones says to resume    ferrous sulfate 325 mg (65 mg elemental iron) oral tablet  -- 1 tab(s) by mouth once a day  -- Indication: For Home med    senna oral tablet  -- 2 tab(s) by mouth once a day (at bedtime)  over the counter for constipation from pain meds   -- Indication: For bowel stimulant    docusate sodium 100 mg oral capsule  -- 1 cap(s) by mouth 3 times a day  over the counter for constipation from pain meds   -- Indication: For Stool softener    omeprazole 20 mg oral delayed release capsule  -- 1 cap(s) by mouth once a day  -- Indication: For Home med    Vitamin D3 5000 intl units oral capsule  -- 1 cap(s) by mouth once a day  -- Indication: For Home med    folic acid 1 mg oral tablet  -- 1 tab(s) by mouth once a day  -- Indication: For Home med I will START or STAY ON the medications listed below when I get home from the hospital:    Mobic 15 mg oral tablet  -- 1 tab(s) by mouth once a day take with food  -- Do not take this drug if you are pregnant.  Obtain medical advice before taking any non-prescription drugs as some may affect the action of this medication.  Take with food or milk.    -- Indication: For Anti inflammatory    Percocet 5/325 oral tablet  -- 1-2 tab(s) by mouth every 6 hours as needed for moderate to severe pain  begin tapering off as pain decreases  MDD:EIGHT TABS  -- Caution federal law prohibits the transfer of this drug to any person other  than the person for whom it was prescribed.  May cause drowsiness.  Alcohol may intensify this effect.  Use care when operating dangerous machinery.  This prescription cannot be refilled.  This product contains acetaminophen.  Do not use  with any other product containing acetaminophen to prevent possible liver damage.  Using more of this medication than prescribed may cause serious breathing problems.    -- Indication: For Pain control    traMADol 50 mg oral tablet  -- 1 tab(s) by mouth every 8 hours   take regularly (baseline control of mild pain)  begin tapering off as pain decreases  MDD:THREE TABS  -- Indication: For Pain control    aspirin 325 mg oral delayed release tablet  -- 1 tab(s) by mouth 2 times a day (with meals)   -- Indication: For blood thinner MUST TAKE WITH FOOD    gabapentin 300 mg oral tablet  -- 1  by mouth once a day  -- Indication: For Home med    gabapentin 300 mg oral tablet  -- 2 tab(s) by mouth once a day (at bedtime)  -- Indication: For Home med    methotrexate 2.5 mg oral tablet  -- 7 tab(s) by mouth weekly on Friday  Last dose 2/23/18.  -- Indication: For Home med - hold off until Dr. Jones says to resume    hydroCHLOROthiazide 25 mg oral tablet  -- 1 tab(s) by mouth once a day  -- Indication: For Home med    ferrous sulfate 325 mg (65 mg elemental iron) oral tablet  -- 1 tab(s) by mouth once a day  -- Indication: For Home med    senna oral tablet  -- 2 tab(s) by mouth once a day (at bedtime)  over the counter for constipation from pain meds   -- Indication: For bowel stimulant    docusate sodium 100 mg oral capsule  -- 1 cap(s) by mouth 3 times a day  over the counter for constipation from pain meds   -- Indication: For Stool softener    omeprazole 20 mg oral delayed release capsule  -- 1 cap(s) by mouth once a day  -- Indication: For Home med    Vitamin D3 5000 intl units oral capsule  -- 1 cap(s) by mouth once a day  -- Indication: For Home med    folic acid 1 mg oral tablet  -- 1 tab(s) by mouth once a day  -- Indication: For Home med

## 2018-03-13 NOTE — OCCUPATIONAL THERAPY INITIAL EVALUATION ADULT - GENERAL OBSERVATIONS, REHAB EVAL
Pt. received semisupine on stretcher from PACU outside room 9T 924A with transporter, RN, and PT present. No acute distress. Patient agreed to evaluation from Occupational Therapist. +Clean dry intact dressing to Right Hip, +Heplock, +Abduction Hip Pillow, +Bilateral Venodynes.

## 2018-03-13 NOTE — PHYSICAL THERAPY INITIAL EVALUATION ADULT - GENERAL OBSERVATIONS, REHAB EVAL
Consult received, chart reviewed. Patient received supine in stretcher in [facility]. Patient agreed to EVALUATION from Physical Therapist.

## 2018-03-13 NOTE — PROVIDER CONTACT NOTE (MEDICATION) - ASSESSMENT
A&Ox4, vital signs stable, and afebrile. Pt complained of nausea, Zofran IVP given. A few minutes later, pt c/o redness/itchiness around IV site. Pt stated it is subsiding. IV discontinued. Hot pack applied. Will insert new IV at alternate site.

## 2018-03-13 NOTE — DISCHARGE NOTE ADULT - ADDITIONAL INSTRUCTIONS
post surgical care  53yo female is s/p Right total hip arthroplasty 3/13/2018 without any intraoperative complications.  Patient is doing well and stable for discharge.  Patient is tolerating physical therapy: weight bearing to Right leg, gait training, STRICT ANTERIOR HIP PRECAUTIONS.  If applicable, keep Aquacel dressing on as is until day of staple removal.  Staples/sutures to be removed 14 days from date of surgery.  Patient is on Aspirin (MUST TAKE WITH FOOD) for anticoagulation.  Orthopaedic Surgeon Dr. Jones would like patient to call private MD/Internist for appointment postop to maintain continuity of care.  Follow up with Dr. Jones's office in 1-2 weeks.  Istop reviewed Reference #: [id number]

## 2018-03-13 NOTE — OCCUPATIONAL THERAPY INITIAL EVALUATION ADULT - NS ASR BATHING EQUIP NEEDS
Pt. to be educated on benefits and how to privately purchase during upcoming ADL session./shower chair

## 2018-03-13 NOTE — ASU PREOP CHECKLIST - PATIENT'S PERSONAL PROPERTY REMOVED
glasses/has one tooth to remove left upper front glasses/has one tooth to remove left upper front,dr scott aware & it was left in place

## 2018-03-13 NOTE — DISCHARGE NOTE ADULT - PLAN OF CARE
pain control-> pain med may cause drowsiness, refrain from activities require decision making; physical therapy to help resume activities of daily living Office appointment is already made (see card attached to discharge folder) so if you need to reschedule please call Dr. Jones's office [phone]  weight bearing as tolerated to Right leg  TOTAL HIP PRECAUTIONS ANTERIOR

## 2018-03-13 NOTE — PHYSICAL THERAPY INITIAL EVALUATION ADULT - RANGE OF MOTION EXAMINATION, REHAB EVAL
Right hip ~80 degrees while sitting at edge of bed/bilateral upper extremity ROM was WFL (within functional limits)/bilateral lower extremity ROM was WFL (within functional limits)

## 2018-03-13 NOTE — DISCHARGE NOTE ADULT - MEDICATION SUMMARY - MEDICATIONS TO STOP TAKING
I will STOP taking the medications listed below when I get home from the hospital:    methotrexate 2.5 mg oral tablet  -- 7 tab(s) by mouth weekly on Friday  Last dose 2/23/18.

## 2018-03-13 NOTE — PATIENT PROFILE ADULT. - NS PRO CONTRA REFUSE FLU INFO
Patient/Caregiver provided printed discharge information.
Risks/benefits discussed with patient or patient surrogate

## 2018-03-13 NOTE — PROGRESS NOTE ADULT - SUBJECTIVE AND OBJECTIVE BOX
Patient is seen and examined at bedside. Denies any CP / SOB / DIzziness / N /V /D/ HA. Pain is controlled at moment.     Vital Signs Last 24 Hrs  T(C): 36.7 (13 Mar 2018 13:37), Max: 36.8 (13 Mar 2018 06:24)  T(F): 98 (13 Mar 2018 13:37), Max: 98.2 (13 Mar 2018 06:24)  HR: 60 (13 Mar 2018 13:37) (59 - 78)  BP: 132/74 (13 Mar 2018 13:37) (118/63 - 152/84)  BP(mean): 92 (13 Mar 2018 09:40) (87 - 92)  RR: 16 (13 Mar 2018 13:37) (12 - 19)  SpO2: 99% (13 Mar 2018 13:37) (97% - 100%)    Gen: NAD    LE: Dressing C/D/I.   RLE: Motor intact EHL/FHL/ TA/ GS. Sensation is grossly intact to light touch in the distal extremities. Compartments are soft. Extremities are warm.  DP 1+ RLE.    Labs:                        12.3   11.73 )-----------( 250      ( 13 Mar 2018 10:00 )             38.0     03-13    141  |  100  |  17  ----------------------------<  143<H>  4.0   |  30  |  0.71    Ca    8.6      13 Mar 2018 10:00        A/P: Patient is a 51 y/o Female s/p R Total Hip Arthroplasty, POD # 0  - Pain control / Analgesia  - Inc Spirometry   - Venodynes  - DVT Prophylaxis ASA BID  - PT / OT  - WBAT / OOB  -Use total hip precautions  -abduction pillow  - Notify ortho with questions

## 2018-03-13 NOTE — DISCHARGE NOTE ADULT - CARE PROVIDERS DIRECT ADDRESSES
[email or web address]

## 2018-03-13 NOTE — DISCHARGE NOTE ADULT - HOSPITAL COURSE
53yo female is s/p Right total hip arthroplasty 3/13/2018 without any intraoperative complications.  Patient is doing well and stable for discharge.  Patient is tolerating physical therapy: weight bearing to Right leg, gait training, STRICT ANTERIOR HIP PRECAUTIONS.  If applicable, keep Aquacel dressing on as is until day of staple removal.  Staples/sutures to be removed 14 days from date of surgery.  Patient is on Aspirin (MUST TAKE WITH FOOD) for anticoagulation.  Orthopaedic Surgeon Dr. Jones would like patient to call private MD/Internist for appointment postop to maintain continuity of care.  Follow uni-compartmental knee arthroplastyp with Dr. Jones's office in 1-2 weeks.    Istop reviewed Reference #: [id number]

## 2018-03-13 NOTE — BRIEF OPERATIVE NOTE - PROCEDURE
<<-----Click on this checkbox to enter Procedure Arthroplasty of right hip by anterior approach  03/13/2018    Active  RAI

## 2018-03-13 NOTE — DISCHARGE NOTE ADULT - PATIENT PORTAL LINK FT
You can access the [facility] Patient Portal, offered by [facility], by registering with the following website: [email or web address]

## 2018-03-13 NOTE — CONSULT NOTE ADULT - SUBJECTIVE AND OBJECTIVE BOX
Patient is a 52y old  Female who presents with a chief complaint of elective Right total hip arthroplasty 3/13/2018 (13 Mar 2018 13:21)      HPI:  52 year old female presents to presurgical testing with diagnosis of unilateral primary osteoarthritis, right hip scheduled for right total hip replacement direct anterior approach for 3/13/18. Pt with Hx of RA, and osteoarthritis complaining of limping for years, with increasing pain and dysfunction of right hip, over last year. Pain is severe and persistent despite conservative  management. Right hip X-ray completed and referred for surgical intervention. (27 Feb 2018 08:41)      PAST MEDICAL & SURGICAL HISTORY:  Lumbar herniated disc  Herniated disc, cervical  HTN (hypertension)  Unilateral primary osteoarthritis, right hip  Diverticulitis: s/p surgery ~8 years ago  Rheumatoid arthritis  GERD (gastroesophageal reflux disease)  Neuropathy  HTN (hypertension)  Anemia: iron deficiency  Hernia, ventral: repair  History of colon resection: ~8 years ago, with colostomy, later reversed  S/P cholecystectomy      Review of Systems:   CONSTITUTIONAL: No fever,  or fatigue  NECK: No pain or stiffness  RESPIRATORY: No cough, wheezing, chills or hemoptysis; No shortness of breath  CARDIOVASCULAR: No chest pain, palpitations, dizziness, or leg swelling  GASTROINTESTINAL: No abdominal or epigastric pain. No nausea, vomiting, or hematemesis; No diarrhea or constipation.   NEUROLOGICAL: No headaches,   MUSCULOSKELETAL: No joint pain or swelling; No muscle, back, or extremity pain        Allergies    No Known Allergies    Intolerances        Social History:     FAMILY HISTORY:      MEDICATIONS  (STANDING):  acetaminophen   Tablet 650 milliGRAM(s) Oral every 6 hours  aspirin enteric coated 325 milliGRAM(s) Oral two times a day  ceFAZolin   IVPB 2000 milliGRAM(s) IV Intermittent every 8 hours  docusate sodium 100 milliGRAM(s) Oral three times a day  gabapentin 600 milliGRAM(s) Oral at bedtime  gabapentin 300 milliGRAM(s) Oral daily  hydrochlorothiazide 25 milliGRAM(s) Oral daily  ketorolac   Injectable 15 milliGRAM(s) IV Push every 6 hours  lactated ringers. 1000 milliLiter(s) (150 mL/Hr) IV Continuous <Continuous>  methotrexate (Non - oncologic) 17.5 milliGRAM(s) Oral every week  pantoprazole    Tablet 40 milliGRAM(s) Oral daily  polyethylene glycol 3350 17 Gram(s) Oral daily  senna 2 Tablet(s) Oral at bedtime  sodium chloride 0.9% lock flush 3 milliLiter(s) IV Push every 8 hours  traMADol 50 milliGRAM(s) Oral every 8 hours    MEDICATIONS  (PRN):  acetaminophen   Tablet 650 milliGRAM(s) Oral every 6 hours PRN For Temp over 38.3 C (100.94 F)  aluminum hydroxide/magnesium hydroxide/simethicone Suspension 30 milliLiter(s) Oral four times a day PRN Indigestion  morphine  - Injectable 2 milliGRAM(s) IV Push every 4 hours PRN breakthrough pain  oxyCODONE    IR 5 milliGRAM(s) Oral every 4 hours PRN mild and moderate pain  oxyCODONE    IR 10 milliGRAM(s) Oral every 4 hours PRN Severe Pain (7 - 10)      CAPILLARY BLOOD GLUCOSE      POCT Blood Glucose.: 98 mg/dL (13 Mar 2018 06:14)    I&O's Summary    13 Mar 2018 07:01  -  13 Mar 2018 22:54  --------------------------------------------------------  IN: 300 mL / OUT: 1000 mL / NET: -700 mL        PHYSICAL EXAM:    HEAD:  Atraumatic, Normocephalic  NECK: Supple, No JVD  CHEST/LUNG: Clear to auscultation bilaterally; No wheezing.  HEART: Regular rate and rhythm; No murmurs, rubs, or gallops  ABDOMEN: Soft, Nontender, Nondistended; Bowel sounds present  EXTREMITIES:  2+ Peripheral Pulses, No clubbing, cyanosis, or edema  PSYCH: AAOx3  NEUROLOGY: non-focal      LABS:                        12.3   11.73 )-----------( 250      ( 13 Mar 2018 10:00 )             38.0     03-13    141  |  100  |  17  ----------------------------<  143<H>  4.0   |  30  |  0.71    Ca    8.6      13 Mar 2018 10:00              CAPILLARY BLOOD GLUCOSE      POCT Blood Glucose.: 98 mg/dL (13 Mar 2018 06:14)                RADIOLOGY & ADDITIONAL TESTS:    Imaging Personally Reviewed:    Consultant(s) Notes Reviewed:      Care Discussed with Consultants/Other Providers:    Thanks for consult. Will follow.

## 2018-03-13 NOTE — DISCHARGE NOTE ADULT - CARE PLAN
Principal Discharge DX:	Primary osteoarthritis of right hip  Goal:	pain control-> pain med may cause drowsiness, refrain from activities require decision making; physical therapy to help resume activities of daily living  Assessment and plan of treatment:	Office appointment is already made (see card attached to discharge folder) so if you need to reschedule please call Dr. Jones's office [phone]  weight bearing as tolerated to Right leg  TOTAL HIP PRECAUTIONS ANTERIOR

## 2018-03-13 NOTE — PHYSICAL THERAPY INITIAL EVALUATION ADULT - GAIT DISTANCE, PT EVAL
Pt. c/o lightheadedness and nausea, symptoms resolved upon supine position in bed. RN Clara DOMÍNGUEZ. present and aware./50 feet

## 2018-03-13 NOTE — CONSULT NOTE ADULT - ASSESSMENT
Patient is a 52y old  Female who presents with a chief complaint of elective Right total hip arthroplasty 3/13/2018 (13 Mar 2018 13:21)    S/p Rt THR:    WBAT  Pain control - Adequate.  DVT prophylaxis  BID  Bowel regimen.  Ortho f/up noted.    RA:  Methotrexate.    Neuropathy:  Neurontin    HTN:  HCTZ

## 2018-03-13 NOTE — BRIEF OPERATIVE NOTE - PRE-OP DX
Osteoarthritis  03/13/2018    Active  Yolanda Cotter
Osteoarthritis  03/13/2018    Active  Yolanda Cotter  Primary osteoarthritis of right hip  03/13/2018    Active  Darlene Torres

## 2018-03-13 NOTE — BRIEF OPERATIVE NOTE - POST-OP DX
Primary osteoarthritis of right hip  03/13/2018    Active  Yolanda Cotter  Primary osteoarthritis of right hip  03/13/2018    Active  Darlene Torres
Primary osteoarthritis of right hip  03/13/2018    Active  Darlene Torres

## 2018-03-13 NOTE — DISCHARGE NOTE ADULT - INSTRUCTIONS
resume same diet as prior to surgery You have a post op appointment with Dr. Jones on March 30. 2018 @ 8:15am in the [facility]. If you are unable to keep this appointment, please call the office to reschedule. Call MD if you develop a fever, or if there is redness, swelling, drainage or pain not relieved by pain medication. No heavy lifting, bending, or straining to move your bowels. Take over the counter stool softeners as needed to prevent constipation which may be caused by pain medication.

## 2018-03-13 NOTE — PHYSICAL THERAPY INITIAL EVALUATION ADULT - LIVES WITH, PROFILE
family in ranch style home with 3+5 stairs to enter, additional 5 stairs within home to bedroom and bathroom

## 2018-03-13 NOTE — DISCHARGE NOTE ADULT - NS AS DC FOLLOWUP STROKE INST
Smoking Cessation Smoking Cessation/Influenza vaccination ([facility] Pub Date: August 7, 2015)/total hip, exercise worksheet, anterior precautions,, mobic, percocet, tramadol, ecotrin, gabapentin

## 2018-03-13 NOTE — OCCUPATIONAL THERAPY INITIAL EVALUATION ADULT - LIVES WITH, PROFILE
Pt. reports she lives with her family in a house with 8 steps to enter. Once inside, pt. reports she has 5 steps to negotiate to reach bedroom and bathroom. Per pt., she has a shower stall in her bathroom.

## 2018-03-13 NOTE — PHYSICAL THERAPY INITIAL EVALUATION ADULT - CRITERIA FOR SKILLED THERAPEUTIC INTERVENTIONS
rehab potential/anticipated equipment needs at discharge/anticipated discharge recommendation/predicted duration of therapy intervention/impairments found/therapy frequency

## 2018-03-13 NOTE — PHYSICAL THERAPY INITIAL EVALUATION ADULT - PLANNED THERAPY INTERVENTIONS, PT EVAL
balance training/gait training/transfer training/ROM/strengthening/stair training/bed mobility training

## 2018-03-13 NOTE — BRIEF OPERATIVE NOTE - PROCEDURE
<<-----Click on this checkbox to enter Procedure Total hip arthroplasty by direct anterior approach  03/13/2018    Active  CBUB

## 2018-03-14 VITALS
TEMPERATURE: 98 F | DIASTOLIC BLOOD PRESSURE: 86 MMHG | HEART RATE: 81 BPM | SYSTOLIC BLOOD PRESSURE: 148 MMHG | RESPIRATION RATE: 17 BRPM | OXYGEN SATURATION: 97 %

## 2018-03-14 LAB
BUN SERPL-MCNC: 13 MG/DL — SIGNIFICANT CHANGE UP (ref 7–23)
CALCIUM SERPL-MCNC: 8.7 MG/DL — SIGNIFICANT CHANGE UP (ref 8.4–10.5)
CHLORIDE SERPL-SCNC: 102 MMOL/L — SIGNIFICANT CHANGE UP (ref 98–107)
CO2 SERPL-SCNC: 28 MMOL/L — SIGNIFICANT CHANGE UP (ref 22–31)
CREAT SERPL-MCNC: 0.73 MG/DL — SIGNIFICANT CHANGE UP (ref 0.5–1.3)
GLUCOSE SERPL-MCNC: 117 MG/DL — HIGH (ref 70–99)
HCT VFR BLD CALC: 34.4 % — LOW (ref 34.5–45)
HGB BLD-MCNC: 11.8 G/DL — SIGNIFICANT CHANGE UP (ref 11.5–15.5)
MCHC RBC-ENTMCNC: 30 PG — SIGNIFICANT CHANGE UP (ref 27–34)
MCHC RBC-ENTMCNC: 34.3 % — SIGNIFICANT CHANGE UP (ref 32–36)
MCV RBC AUTO: 87.5 FL — SIGNIFICANT CHANGE UP (ref 80–100)
NRBC # FLD: 0 — SIGNIFICANT CHANGE UP
PLATELET # BLD AUTO: 234 K/UL — SIGNIFICANT CHANGE UP (ref 150–400)
PMV BLD: 10.5 FL — SIGNIFICANT CHANGE UP (ref 7–13)
POTASSIUM SERPL-MCNC: 3.8 MMOL/L — SIGNIFICANT CHANGE UP (ref 3.5–5.3)
POTASSIUM SERPL-SCNC: 3.8 MMOL/L — SIGNIFICANT CHANGE UP (ref 3.5–5.3)
RBC # BLD: 3.93 M/UL — SIGNIFICANT CHANGE UP (ref 3.8–5.2)
RBC # FLD: 14.2 % — SIGNIFICANT CHANGE UP (ref 10.3–14.5)
SODIUM SERPL-SCNC: 140 MMOL/L — SIGNIFICANT CHANGE UP (ref 135–145)
WBC # BLD: 13.84 K/UL — HIGH (ref 3.8–10.5)
WBC # FLD AUTO: 13.84 K/UL — HIGH (ref 3.8–10.5)

## 2018-03-14 PROCEDURE — 99238 HOSP IP/OBS DSCHRG MGMT 30/<: CPT

## 2018-03-14 RX ORDER — GABAPENTIN 400 MG/1
2 CAPSULE ORAL
Qty: 0 | Refills: 0 | COMMUNITY

## 2018-03-14 RX ORDER — OMEPRAZOLE 10 MG/1
1 CAPSULE, DELAYED RELEASE ORAL
Qty: 0 | Refills: 0 | COMMUNITY

## 2018-03-14 RX ORDER — ASPIRIN/CALCIUM CARB/MAGNESIUM 324 MG
1 TABLET ORAL
Qty: 84 | Refills: 0 | OUTPATIENT
Start: 2018-03-14 | End: 2018-04-24

## 2018-03-14 RX ORDER — MELOXICAM 15 MG/1
1 TABLET ORAL
Qty: 30 | Refills: 0 | OUTPATIENT
Start: 2018-03-14 | End: 2018-04-12

## 2018-03-14 RX ORDER — DOCUSATE SODIUM 100 MG
1 CAPSULE ORAL
Qty: 0 | Refills: 0 | COMMUNITY
Start: 2018-03-14

## 2018-03-14 RX ORDER — SENNA PLUS 8.6 MG/1
2 TABLET ORAL
Qty: 0 | Refills: 0 | COMMUNITY
Start: 2018-03-14

## 2018-03-14 RX ORDER — FOLIC ACID 0.8 MG
1 TABLET ORAL
Qty: 0 | Refills: 0 | COMMUNITY

## 2018-03-14 RX ORDER — METHOTREXATE 2.5 MG/1
7 TABLET ORAL
Qty: 0 | Refills: 0 | COMMUNITY

## 2018-03-14 RX ORDER — FERROUS SULFATE 325(65) MG
1 TABLET ORAL
Qty: 0 | Refills: 0 | COMMUNITY

## 2018-03-14 RX ORDER — TRAMADOL HYDROCHLORIDE 50 MG/1
1 TABLET ORAL
Qty: 21 | Refills: 0 | OUTPATIENT
Start: 2018-03-14 | End: 2018-03-20

## 2018-03-14 RX ORDER — GABAPENTIN 400 MG/1
1 CAPSULE ORAL
Qty: 0 | Refills: 0 | COMMUNITY

## 2018-03-14 RX ORDER — CHOLECALCIFEROL (VITAMIN D3) 125 MCG
1 CAPSULE ORAL
Qty: 0 | Refills: 0 | COMMUNITY

## 2018-03-14 RX ORDER — HYDROXYCHLOROQUINE SULFATE 200 MG
1 TABLET ORAL
Qty: 0 | Refills: 0 | COMMUNITY

## 2018-03-14 RX ADMIN — Medication 650 MILLIGRAM(S): at 05:48

## 2018-03-14 RX ADMIN — Medication 15 MILLIGRAM(S): at 12:03

## 2018-03-14 RX ADMIN — Medication 650 MILLIGRAM(S): at 12:03

## 2018-03-14 RX ADMIN — Medication 25 MILLIGRAM(S): at 05:42

## 2018-03-14 RX ADMIN — Medication 1 MILLIGRAM(S): at 12:03

## 2018-03-14 RX ADMIN — Medication 15 MILLIGRAM(S): at 05:42

## 2018-03-14 RX ADMIN — Medication 325 MILLIGRAM(S): at 12:03

## 2018-03-14 RX ADMIN — Medication 325 MILLIGRAM(S): at 05:42

## 2018-03-14 RX ADMIN — GABAPENTIN 300 MILLIGRAM(S): 400 CAPSULE ORAL at 12:03

## 2018-03-14 RX ADMIN — Medication 102 MILLIGRAM(S): at 05:43

## 2018-03-14 RX ADMIN — SODIUM CHLORIDE 1000 MILLILITER(S): 9 INJECTION INTRAMUSCULAR; INTRAVENOUS; SUBCUTANEOUS at 05:44

## 2018-03-14 RX ADMIN — Medication 100 MILLIGRAM(S): at 00:06

## 2018-03-14 RX ADMIN — Medication 650 MILLIGRAM(S): at 00:06

## 2018-03-14 RX ADMIN — PANTOPRAZOLE SODIUM 40 MILLIGRAM(S): 20 TABLET, DELAYED RELEASE ORAL at 12:03

## 2018-03-14 RX ADMIN — SODIUM CHLORIDE 3 MILLILITER(S): 9 INJECTION INTRAMUSCULAR; INTRAVENOUS; SUBCUTANEOUS at 05:40

## 2018-03-14 RX ADMIN — Medication 15 MILLIGRAM(S): at 00:05

## 2018-03-14 RX ADMIN — Medication 100 MILLIGRAM(S): at 05:48

## 2018-03-14 NOTE — PROGRESS NOTE ADULT - SUBJECTIVE AND OBJECTIVE BOX
Ortho Progress Note  BECCA RANGEL   MRN-[id number]    52yFemale is s/p R DA ELBERT POD#1 w/out any c/o.  Resting comfortably, NAD, ANO x 3    Vital Signs Last 24 Hrs  T(C): 36.6 (14 Mar 2018 05:32), Max: 37.1 (13 Mar 2018 17:20)  T(F): 97.9 (14 Mar 2018 05:32), Max: 98.7 (13 Mar 2018 17:20)  HR: 76 (14 Mar 2018 05:32) (59 - 86)  BP: 156/85 (14 Mar 2018 05:32) (118/63 - 156/85)  BP(mean): 92 (13 Mar 2018 09:40) (87 - 92)  RR: 16 (14 Mar 2018 05:32) (12 - 19)  SpO2: 96% (14 Mar 2018 05:32) (94% - 100%)  No Known Allergies      S/P R ELBERT  R hip wound postop outer dressing removed  R hip wound aquacel dressing is C/D/I  motor BLE EHL, TA, GS intact  sensation BLE intact to light touch                          12.3   11.73 )-----------( 250      ( 13 Mar 2018 10:00 )             38.0     03-13    141  |  100  |  17  ----------------------------<  143<H>  4.0   |  30  |  0.71    Ca    8.6      13 Mar 2018 10:00        A/P Ortho Stable s/p R DA ELBERT POD#1  ck Labs this am  continue Aspirin for anticoagulation   continue Physical Therapy BID: WBAT, TOTAL HIP PRECAUTIONS ANTERIOR  discharge planning today when pt is cleared by physical therapy for safe home discharge

## 2018-03-14 NOTE — PROGRESS NOTE ADULT - SUBJECTIVE AND OBJECTIVE BOX
Patient is a 52y old  Female who presents with a chief complaint of elective Right total hip arthroplasty 3/13/2018 (13 Mar 2018 13:21)      SUBJECTIVE / OVERNIGHT EVENTS:   Feels better.  Denies CP/SOB/Palpitation/HA.    MEDICATIONS  (STANDING):  acetaminophen   Tablet 650 milliGRAM(s) Oral every 6 hours  aspirin enteric coated 325 milliGRAM(s) Oral two times a day  docusate sodium 100 milliGRAM(s) Oral three times a day  ferrous    sulfate 325 milliGRAM(s) Oral daily  folic acid 1 milliGRAM(s) Oral daily  gabapentin 600 milliGRAM(s) Oral at bedtime  gabapentin 300 milliGRAM(s) Oral daily  hydrochlorothiazide 25 milliGRAM(s) Oral daily  ketorolac   Injectable 15 milliGRAM(s) IV Push every 6 hours  lactated ringers. 1000 milliLiter(s) (150 mL/Hr) IV Continuous <Continuous>  methotrexate (Non - oncologic) 17.5 milliGRAM(s) Oral every week  pantoprazole    Tablet 40 milliGRAM(s) Oral daily  polyethylene glycol 3350 17 Gram(s) Oral daily  senna 2 Tablet(s) Oral at bedtime  sodium chloride 0.9% lock flush 3 milliLiter(s) IV Push every 8 hours  traMADol 50 milliGRAM(s) Oral every 8 hours    MEDICATIONS  (PRN):  acetaminophen   Tablet 650 milliGRAM(s) Oral every 6 hours PRN For Temp over 38.3 C (100.94 F)  aluminum hydroxide/magnesium hydroxide/simethicone Suspension 30 milliLiter(s) Oral four times a day PRN Indigestion  morphine  - Injectable 2 milliGRAM(s) IV Push every 4 hours PRN breakthrough pain  oxyCODONE    IR 5 milliGRAM(s) Oral every 4 hours PRN mild and moderate pain  oxyCODONE    IR 10 milliGRAM(s) Oral every 4 hours PRN Severe Pain (7 - 10)        CAPILLARY BLOOD GLUCOSE        I&O's Summary    13 Mar 2018 07:01  -  14 Mar 2018 07:00  --------------------------------------------------------  IN: 300 mL / OUT: 2000 mL / NET: -1700 mL    14 Mar 2018 07:01  -  14 Mar 2018 11:35  --------------------------------------------------------  IN: 0 mL / OUT: 1000 mL / NET: -1000 mL        PHYSICAL EXAM:  GENERAL: NAD, well-developed  HEAD:  Atraumatic, Normocephalic  NECK: Supple, No JVD  CHEST/LUNG: Clear to auscultation bilaterally; No wheezing.  HEART: Regular rate and rhythm; No murmurs, rubs, or gallops  ABDOMEN: Soft, Nontender, Nondistended; Bowel sounds present  EXTREMITIES:   No clubbing, cyanosis, or edema  NEUROLOGY: AAO X 3  SKIN: No rashes    LABS:                        11.8   13.84 )-----------( 234      ( 14 Mar 2018 06:45 )             34.4     03-14    140  |  102  |  13  ----------------------------<  117<H>  3.8   |  28  |  0.73    Ca    8.7      14 Mar 2018 06:45              CAPILLARY BLOOD GLUCOSE                    RADIOLOGY & ADDITIONAL TESTS:    Imaging Personally Reviewed:    Consultant(s) Notes Reviewed:      Care Discussed with Consultants/Other Providers:

## 2018-03-14 NOTE — PROGRESS NOTE ADULT - ASSESSMENT
Patient is a 52y old  Female who presents with a chief complaint of elective Right total hip arthroplasty 3/13/2018 (13 Mar 2018 13:21)    S/p Rt THR:    WBAT  Pain control - Adequate.  DVT prophylaxis  BID  Bowel regimen.  Ortho f/up noted.    Leukocytosis:  Reactive    RA:  Methotrexate.    Neuropathy:  Neurontin    HTN:  HCTZ

## 2018-03-14 NOTE — PROGRESS NOTE ADULT - SUBJECTIVE AND OBJECTIVE BOX
S/[facility] Orthopaedic Attending - Juliano Jones MD    S/P Right THR DAA - POD #1    Afebrile  Vital Signs Stable    Right Hip: Wound Dry                        No Swelling                        Nil NVD                        Pain - controlled    PA- Soft  Calves - Soft    Plan:   1. Physical Therapy  2. WBAT Walker  3. Hip Precautions - Anterior  4. Abduction Pillow  5. Static Quads  6. Gluteal Sets  7. SCDS  8. Incentive spirometer  9. Ice Compress q4h for 20 minutes  10. Elevation - 2 pillows under heels.  11. Anticoagulation - Aspirin 325mg PO q12 x 6 weeks  12. D/C Planning - Home  Today  13. Office Review 2 weeks postop

## 2018-03-16 ENCOUNTER — TRANSCRIPTION ENCOUNTER (OUTPATIENT)
Age: 53
End: 2018-03-16

## 2018-03-19 ENCOUNTER — RESULT REVIEW (OUTPATIENT)
Age: 53
End: 2018-03-19

## 2018-03-19 LAB — SURGICAL PATHOLOGY STUDY: SIGNIFICANT CHANGE UP

## 2018-03-30 ENCOUNTER — APPOINTMENT (OUTPATIENT)
Dept: ORTHOPEDIC SURGERY | Facility: CLINIC | Age: 53
End: 2018-03-30
Payer: COMMERCIAL

## 2018-03-30 VITALS
SYSTOLIC BLOOD PRESSURE: 130 MMHG | DIASTOLIC BLOOD PRESSURE: 88 MMHG | HEIGHT: 66 IN | HEART RATE: 83 BPM | WEIGHT: 208 LBS | BODY MASS INDEX: 33.43 KG/M2

## 2018-03-30 DIAGNOSIS — M54.5 LOW BACK PAIN: ICD-10-CM

## 2018-03-30 PROCEDURE — 99024 POSTOP FOLLOW-UP VISIT: CPT

## 2018-03-30 PROCEDURE — 73502 X-RAY EXAM HIP UNI 2-3 VIEWS: CPT | Mod: RT

## 2018-04-05 ENCOUNTER — MEDICATION RENEWAL (OUTPATIENT)
Age: 53
End: 2018-04-05

## 2018-04-17 ENCOUNTER — APPOINTMENT (OUTPATIENT)
Dept: RHEUMATOLOGY | Facility: CLINIC | Age: 53
End: 2018-04-17
Payer: COMMERCIAL

## 2018-04-17 VITALS
DIASTOLIC BLOOD PRESSURE: 82 MMHG | HEART RATE: 72 BPM | BODY MASS INDEX: 34.49 KG/M2 | OXYGEN SATURATION: 97 % | WEIGHT: 207 LBS | SYSTOLIC BLOOD PRESSURE: 130 MMHG | HEIGHT: 65 IN

## 2018-04-17 LAB
ALBUMIN SERPL ELPH-MCNC: 4.3 G/DL
ALP BLD-CCNC: 56 U/L
ALT SERPL-CCNC: 11 U/L
ANION GAP SERPL CALC-SCNC: 12 MMOL/L
AST SERPL-CCNC: 18 U/L
BILIRUB SERPL-MCNC: 0.2 MG/DL
BUN SERPL-MCNC: 22 MG/DL
CALCIUM SERPL-MCNC: 9.7 MG/DL
CHLORIDE SERPL-SCNC: 100 MMOL/L
CO2 SERPL-SCNC: 29 MMOL/L
CREAT SERPL-MCNC: 0.81 MG/DL
CRP SERPL-MCNC: 0.3 MG/DL
GLUCOSE SERPL-MCNC: 85 MG/DL
POTASSIUM SERPL-SCNC: 3.8 MMOL/L
PROT SERPL-MCNC: 7.3 G/DL
SODIUM SERPL-SCNC: 141 MMOL/L

## 2018-04-17 PROCEDURE — 99214 OFFICE O/P EST MOD 30 MIN: CPT | Mod: 25

## 2018-04-17 PROCEDURE — 20610 DRAIN/INJ JOINT/BURSA W/O US: CPT | Mod: RT

## 2018-04-17 RX ORDER — METHYLPREDNISOLONE ACETATE 80 MG/ML
80 INJECTION, SUSPENSION INTRA-ARTICULAR; INTRALESIONAL; INTRAMUSCULAR; SOFT TISSUE
Refills: 0 | Status: COMPLETED | OUTPATIENT
Start: 2018-04-17

## 2018-04-17 RX ORDER — OXYCODONE AND ACETAMINOPHEN 5; 325 MG/1; MG/1
5-325 TABLET ORAL
Qty: 56 | Refills: 0 | Status: DISCONTINUED | COMMUNITY
Start: 2018-03-14

## 2018-04-17 RX ORDER — LEVOFLOXACIN 500 MG/1
500 TABLET, FILM COATED ORAL DAILY
Qty: 7 | Refills: 0 | Status: COMPLETED | COMMUNITY
Start: 2018-03-01 | End: 2018-04-17

## 2018-04-17 RX ADMIN — METHYLPREDNISOLONE ACETATE MG/ML: 80 INJECTION, SUSPENSION INTRA-ARTICULAR; INTRALESIONAL; INTRAMUSCULAR; SOFT TISSUE at 00:00

## 2018-04-18 LAB
25(OH)D3 SERPL-MCNC: 55.2 NG/ML
BASOPHILS # BLD AUTO: 0.05 K/UL
BASOPHILS NFR BLD AUTO: 0.7 %
EOSINOPHIL # BLD AUTO: 0.43 K/UL
EOSINOPHIL NFR BLD AUTO: 5.9 %
ERYTHROCYTE [SEDIMENTATION RATE] IN BLOOD BY WESTERGREN METHOD: 24 MM/HR
HCT VFR BLD CALC: 38.2 %
HGB BLD-MCNC: 11.7 G/DL
IMM GRANULOCYTES NFR BLD AUTO: 0.4 %
LYMPHOCYTES # BLD AUTO: 2.03 K/UL
LYMPHOCYTES NFR BLD AUTO: 27.8 %
MAN DIFF?: NORMAL
MCHC RBC-ENTMCNC: 28.7 PG
MCHC RBC-ENTMCNC: 30.6 GM/DL
MCV RBC AUTO: 93.6 FL
MONOCYTES # BLD AUTO: 0.47 K/UL
MONOCYTES NFR BLD AUTO: 6.4 %
NEUTROPHILS # BLD AUTO: 4.3 K/UL
NEUTROPHILS NFR BLD AUTO: 58.8 %
PLATELET # BLD AUTO: 325 K/UL
RBC # BLD: 4.08 M/UL
RBC # FLD: 15.3 %
WBC # FLD AUTO: 7.31 K/UL

## 2018-05-11 ENCOUNTER — APPOINTMENT (OUTPATIENT)
Dept: ORTHOPEDIC SURGERY | Facility: CLINIC | Age: 53
End: 2018-05-11
Payer: COMMERCIAL

## 2018-05-11 VITALS
HEART RATE: 64 BPM | SYSTOLIC BLOOD PRESSURE: 139 MMHG | HEIGHT: 65 IN | BODY MASS INDEX: 34.49 KG/M2 | DIASTOLIC BLOOD PRESSURE: 90 MMHG | WEIGHT: 207 LBS

## 2018-05-11 PROCEDURE — 99024 POSTOP FOLLOW-UP VISIT: CPT

## 2018-06-06 ENCOUNTER — MEDICATION RENEWAL (OUTPATIENT)
Age: 53
End: 2018-06-06

## 2018-06-11 ENCOUNTER — FORM ENCOUNTER (OUTPATIENT)
Age: 53
End: 2018-06-11

## 2018-06-19 ENCOUNTER — APPOINTMENT (OUTPATIENT)
Dept: RHEUMATOLOGY | Facility: CLINIC | Age: 53
End: 2018-06-19
Payer: COMMERCIAL

## 2018-06-19 VITALS
OXYGEN SATURATION: 97 % | DIASTOLIC BLOOD PRESSURE: 74 MMHG | BODY MASS INDEX: 34.49 KG/M2 | SYSTOLIC BLOOD PRESSURE: 110 MMHG | WEIGHT: 207 LBS | HEIGHT: 65 IN | HEART RATE: 62 BPM

## 2018-06-19 DIAGNOSIS — Z87.19 PERSONAL HISTORY OF OTHER DISEASES OF THE DIGESTIVE SYSTEM: ICD-10-CM

## 2018-06-19 DIAGNOSIS — L25.9 UNSPECIFIED CONTACT DERMATITIS, UNSPECIFIED CAUSE: ICD-10-CM

## 2018-06-19 DIAGNOSIS — R21 RASH AND OTHER NONSPECIFIC SKIN ERUPTION: ICD-10-CM

## 2018-06-19 PROCEDURE — 99214 OFFICE O/P EST MOD 30 MIN: CPT | Mod: 25

## 2018-06-19 PROCEDURE — 96372 THER/PROPH/DIAG INJ SC/IM: CPT

## 2018-06-20 PROBLEM — R21 RASH: Status: ACTIVE | Noted: 2017-06-16

## 2018-07-03 ENCOUNTER — MEDICATION RENEWAL (OUTPATIENT)
Age: 53
End: 2018-07-03

## 2018-07-17 PROBLEM — I10 ESSENTIAL (PRIMARY) HYPERTENSION: Chronic | Status: ACTIVE | Noted: 2018-02-27

## 2018-07-17 PROBLEM — K57.92 DIVERTICULITIS OF INTESTINE, PART UNSPECIFIED, WITHOUT PERFORATION OR ABSCESS WITHOUT BLEEDING: Chronic | Status: ACTIVE | Noted: 2018-02-27

## 2018-07-17 PROBLEM — D64.9 ANEMIA, UNSPECIFIED: Chronic | Status: ACTIVE | Noted: 2018-02-27

## 2018-07-17 PROBLEM — M51.26 OTHER INTERVERTEBRAL DISC DISPLACEMENT, LUMBAR REGION: Chronic | Status: ACTIVE | Noted: 2018-02-27

## 2018-07-17 PROBLEM — M50.20 OTHER CERVICAL DISC DISPLACEMENT, UNSPECIFIED CERVICAL REGION: Chronic | Status: ACTIVE | Noted: 2018-02-27

## 2018-07-17 PROBLEM — K21.9 GASTRO-ESOPHAGEAL REFLUX DISEASE WITHOUT ESOPHAGITIS: Chronic | Status: ACTIVE | Noted: 2018-02-27

## 2018-07-17 PROBLEM — M16.11 UNILATERAL PRIMARY OSTEOARTHRITIS, RIGHT HIP: Chronic | Status: ACTIVE | Noted: 2018-02-27

## 2018-07-17 PROBLEM — G62.9 POLYNEUROPATHY, UNSPECIFIED: Chronic | Status: ACTIVE | Noted: 2018-02-27

## 2018-07-17 PROBLEM — M06.9 RHEUMATOID ARTHRITIS, UNSPECIFIED: Chronic | Status: ACTIVE | Noted: 2018-02-27

## 2018-07-17 LAB
BASOPHILS # BLD AUTO: 0.03 K/UL
BASOPHILS NFR BLD AUTO: 0.4 %
EOSINOPHIL # BLD AUTO: 0.21 K/UL
EOSINOPHIL NFR BLD AUTO: 2.7 %
HCT VFR BLD CALC: 42.5 %
HGB BLD-MCNC: 13.8 G/DL
IMM GRANULOCYTES NFR BLD AUTO: 0.1 %
LYMPHOCYTES # BLD AUTO: 2 K/UL
LYMPHOCYTES NFR BLD AUTO: 25.8 %
MAN DIFF?: NORMAL
MCHC RBC-ENTMCNC: 29 PG
MCHC RBC-ENTMCNC: 32.5 GM/DL
MCV RBC AUTO: 89.3 FL
MONOCYTES # BLD AUTO: 0.44 K/UL
MONOCYTES NFR BLD AUTO: 5.7 %
NEUTROPHILS # BLD AUTO: 5.06 K/UL
NEUTROPHILS NFR BLD AUTO: 65.3 %
PLATELET # BLD AUTO: 272 K/UL
RBC # BLD: 4.76 M/UL
RBC # FLD: 16.7 %
WBC # FLD AUTO: 7.75 K/UL

## 2018-07-18 LAB
ALBUMIN SERPL ELPH-MCNC: 4.7 G/DL
ALP BLD-CCNC: 51 U/L
ALT SERPL-CCNC: 15 U/L
ANION GAP SERPL CALC-SCNC: 16 MMOL/L
AST SERPL-CCNC: 18 U/L
BILIRUB SERPL-MCNC: 0.3 MG/DL
BUN SERPL-MCNC: 21 MG/DL
CALCIUM SERPL-MCNC: 9.6 MG/DL
CHLORIDE SERPL-SCNC: 99 MMOL/L
CO2 SERPL-SCNC: 27 MMOL/L
CREAT SERPL-MCNC: 0.88 MG/DL
CRP SERPL-MCNC: 0.33 MG/DL
ERYTHROCYTE [SEDIMENTATION RATE] IN BLOOD BY WESTERGREN METHOD: 21 MM/HR
GLUCOSE SERPL-MCNC: 104 MG/DL
POTASSIUM SERPL-SCNC: 4.1 MMOL/L
PROT SERPL-MCNC: 7.1 G/DL
SODIUM SERPL-SCNC: 142 MMOL/L

## 2018-07-19 LAB — 25(OH)D3 SERPL-MCNC: 54.5 NG/ML

## 2018-07-20 ENCOUNTER — FORM ENCOUNTER (OUTPATIENT)
Age: 53
End: 2018-07-20

## 2018-07-21 ENCOUNTER — APPOINTMENT (OUTPATIENT)
Dept: MRI IMAGING | Facility: CLINIC | Age: 53
End: 2018-07-21
Payer: COMMERCIAL

## 2018-07-21 ENCOUNTER — OUTPATIENT (OUTPATIENT)
Dept: OUTPATIENT SERVICES | Facility: HOSPITAL | Age: 53
LOS: 1 days | End: 2018-07-21
Payer: COMMERCIAL

## 2018-07-21 DIAGNOSIS — Z90.49 ACQUIRED ABSENCE OF OTHER SPECIFIED PARTS OF DIGESTIVE TRACT: Chronic | ICD-10-CM

## 2018-07-21 DIAGNOSIS — Z00.8 ENCOUNTER FOR OTHER GENERAL EXAMINATION: ICD-10-CM

## 2018-07-21 DIAGNOSIS — K43.9 VENTRAL HERNIA WITHOUT OBSTRUCTION OR GANGRENE: Chronic | ICD-10-CM

## 2018-07-21 DIAGNOSIS — Z98.890 OTHER SPECIFIED POSTPROCEDURAL STATES: Chronic | ICD-10-CM

## 2018-07-21 DIAGNOSIS — M25.572 PAIN IN LEFT ANKLE AND JOINTS OF LEFT FOOT: ICD-10-CM

## 2018-07-21 PROCEDURE — 73721 MRI JNT OF LWR EXTRE W/O DYE: CPT | Mod: 26,LT

## 2018-07-21 PROCEDURE — 73721 MRI JNT OF LWR EXTRE W/O DYE: CPT

## 2018-08-13 ENCOUNTER — LABORATORY RESULT (OUTPATIENT)
Age: 53
End: 2018-08-13

## 2018-08-14 ENCOUNTER — APPOINTMENT (OUTPATIENT)
Dept: RHEUMATOLOGY | Facility: CLINIC | Age: 53
End: 2018-08-14
Payer: COMMERCIAL

## 2018-08-14 ENCOUNTER — MEDICATION RENEWAL (OUTPATIENT)
Age: 53
End: 2018-08-14

## 2018-08-14 VITALS
OXYGEN SATURATION: 98 % | WEIGHT: 207 LBS | HEIGHT: 65 IN | DIASTOLIC BLOOD PRESSURE: 80 MMHG | BODY MASS INDEX: 34.49 KG/M2 | HEART RATE: 67 BPM | SYSTOLIC BLOOD PRESSURE: 122 MMHG

## 2018-08-14 LAB
ALBUMIN SERPL ELPH-MCNC: 4.7 G/DL
ALP BLD-CCNC: 51 U/L
ALT SERPL-CCNC: 15 U/L
ANION GAP SERPL CALC-SCNC: 13 MMOL/L
AST SERPL-CCNC: 19 U/L
BASOPHILS # BLD AUTO: 0.05 K/UL
BASOPHILS NFR BLD AUTO: 0.8 %
BILIRUB SERPL-MCNC: 0.3 MG/DL
BUN SERPL-MCNC: 19 MG/DL
CALCIUM SERPL-MCNC: 9.8 MG/DL
CHLORIDE SERPL-SCNC: 102 MMOL/L
CO2 SERPL-SCNC: 30 MMOL/L
CREAT SERPL-MCNC: 0.74 MG/DL
CRP SERPL-MCNC: 0.27 MG/DL
EOSINOPHIL # BLD AUTO: 0.21 K/UL
EOSINOPHIL NFR BLD AUTO: 3.4 %
ERYTHROCYTE [SEDIMENTATION RATE] IN BLOOD BY WESTERGREN METHOD: 15 MM/HR
GLUCOSE SERPL-MCNC: 77 MG/DL
HBV CORE IGG+IGM SER QL: NONREACTIVE
HBV SURFACE AB SERPL IA-ACNC: <3 MIU/ML
HBV SURFACE AG SER QL: NONREACTIVE
HCT VFR BLD CALC: 42.3 %
HCV AB SER QL: NONREACTIVE
HCV S/CO RATIO: 0.08 S/CO
HGB BLD-MCNC: 13.1 G/DL
IMM GRANULOCYTES NFR BLD AUTO: 0.3 %
LYMPHOCYTES # BLD AUTO: 2 K/UL
LYMPHOCYTES NFR BLD AUTO: 32 %
MAN DIFF?: NORMAL
MCHC RBC-ENTMCNC: 27.3 PG
MCHC RBC-ENTMCNC: 31 GM/DL
MCV RBC AUTO: 88.1 FL
MONOCYTES # BLD AUTO: 0.4 K/UL
MONOCYTES NFR BLD AUTO: 6.4 %
NEUTROPHILS # BLD AUTO: 3.57 K/UL
NEUTROPHILS NFR BLD AUTO: 57.1 %
PLATELET # BLD AUTO: 259 K/UL
POTASSIUM SERPL-SCNC: 4.3 MMOL/L
PROT SERPL-MCNC: 7.3 G/DL
RBC # BLD: 4.8 M/UL
RBC # FLD: 16.7 %
SODIUM SERPL-SCNC: 145 MMOL/L
WBC # FLD AUTO: 6.25 K/UL

## 2018-08-14 PROCEDURE — 99215 OFFICE O/P EST HI 40 MIN: CPT

## 2018-08-15 ENCOUNTER — APPOINTMENT (OUTPATIENT)
Dept: ORTHOPEDIC SURGERY | Facility: CLINIC | Age: 53
End: 2018-08-15
Payer: COMMERCIAL

## 2018-08-15 VITALS
HEART RATE: 67 BPM | SYSTOLIC BLOOD PRESSURE: 125 MMHG | WEIGHT: 210 LBS | HEIGHT: 65 IN | BODY MASS INDEX: 34.99 KG/M2 | DIASTOLIC BLOOD PRESSURE: 81 MMHG

## 2018-08-15 PROCEDURE — 73610 X-RAY EXAM OF ANKLE: CPT | Mod: LT

## 2018-08-15 PROCEDURE — 99214 OFFICE O/P EST MOD 30 MIN: CPT

## 2018-08-16 LAB
M TB IFN-G BLD-IMP: NEGATIVE
QUANTIFERON TB PLUS MITOGEN MINUS NIL: 9.86 IU/ML
QUANTIFERON TB PLUS NIL: 0.03 IU/ML
QUANTIFERON TB PLUS TB1 MINUS NIL: 0.01 IU/ML
QUANTIFERON TB PLUS TB2 MINUS NIL: 0 IU/ML

## 2018-08-30 ENCOUNTER — MEDICATION RENEWAL (OUTPATIENT)
Age: 53
End: 2018-08-30

## 2018-08-31 ENCOUNTER — MEDICATION RENEWAL (OUTPATIENT)
Age: 53
End: 2018-08-31

## 2018-08-31 RX ORDER — ADALIMUMAB 40MG/0.8ML
40 KIT SUBCUTANEOUS
Qty: 2 | Refills: 2 | Status: DISCONTINUED | COMMUNITY
Start: 2018-08-31 | End: 2018-08-31

## 2018-09-13 ENCOUNTER — OTHER (OUTPATIENT)
Age: 53
End: 2018-09-13

## 2018-09-18 ENCOUNTER — MEDICATION RENEWAL (OUTPATIENT)
Age: 53
End: 2018-09-18

## 2018-09-25 ENCOUNTER — APPOINTMENT (OUTPATIENT)
Dept: RHEUMATOLOGY | Facility: CLINIC | Age: 53
End: 2018-09-25
Payer: COMMERCIAL

## 2018-09-25 VITALS
OXYGEN SATURATION: 96 % | HEART RATE: 69 BPM | WEIGHT: 212 LBS | BODY MASS INDEX: 35.32 KG/M2 | HEIGHT: 65 IN | SYSTOLIC BLOOD PRESSURE: 112 MMHG | DIASTOLIC BLOOD PRESSURE: 80 MMHG

## 2018-09-25 DIAGNOSIS — G62.9 POLYNEUROPATHY, UNSPECIFIED: ICD-10-CM

## 2018-09-25 PROCEDURE — 99214 OFFICE O/P EST MOD 30 MIN: CPT

## 2018-09-25 RX ORDER — SULFASALAZINE 500 MG/1
500 TABLET ORAL
Qty: 60 | Refills: 1 | Status: DISCONTINUED | COMMUNITY
Start: 2018-06-19 | End: 2018-09-25

## 2018-09-26 ENCOUNTER — MEDICATION RENEWAL (OUTPATIENT)
Age: 53
End: 2018-09-26

## 2018-09-26 ENCOUNTER — APPOINTMENT (OUTPATIENT)
Dept: ORTHOPEDIC SURGERY | Facility: CLINIC | Age: 53
End: 2018-09-26
Payer: COMMERCIAL

## 2018-09-26 DIAGNOSIS — S93.402A SPRAIN OF UNSPECIFIED LIGAMENT OF LEFT ANKLE, INITIAL ENCOUNTER: ICD-10-CM

## 2018-09-26 PROCEDURE — 99214 OFFICE O/P EST MOD 30 MIN: CPT

## 2018-09-26 PROCEDURE — 73610 X-RAY EXAM OF ANKLE: CPT | Mod: LT

## 2018-10-02 ENCOUNTER — RX RENEWAL (OUTPATIENT)
Age: 53
End: 2018-10-02

## 2018-10-08 ENCOUNTER — MEDICATION RENEWAL (OUTPATIENT)
Age: 53
End: 2018-10-08

## 2018-10-14 ENCOUNTER — RX RENEWAL (OUTPATIENT)
Age: 53
End: 2018-10-14

## 2018-10-16 ENCOUNTER — MEDICATION RENEWAL (OUTPATIENT)
Age: 53
End: 2018-10-16

## 2018-10-30 ENCOUNTER — MEDICATION RENEWAL (OUTPATIENT)
Age: 53
End: 2018-10-30

## 2018-11-02 ENCOUNTER — APPOINTMENT (OUTPATIENT)
Dept: ORTHOPEDIC SURGERY | Facility: CLINIC | Age: 53
End: 2018-11-02
Payer: COMMERCIAL

## 2018-11-02 DIAGNOSIS — M25.572 PAIN IN LEFT ANKLE AND JOINTS OF LEFT FOOT: ICD-10-CM

## 2018-11-02 PROCEDURE — 99214 OFFICE O/P EST MOD 30 MIN: CPT

## 2018-11-30 ENCOUNTER — APPOINTMENT (OUTPATIENT)
Dept: RHEUMATOLOGY | Facility: CLINIC | Age: 53
End: 2018-11-30
Payer: COMMERCIAL

## 2018-11-30 VITALS
OXYGEN SATURATION: 98 % | HEART RATE: 62 BPM | WEIGHT: 207 LBS | HEIGHT: 65 IN | BODY MASS INDEX: 34.49 KG/M2 | SYSTOLIC BLOOD PRESSURE: 128 MMHG | DIASTOLIC BLOOD PRESSURE: 78 MMHG

## 2018-11-30 DIAGNOSIS — M75.81 OTHER SHOULDER LESIONS, RIGHT SHOULDER: ICD-10-CM

## 2018-11-30 PROCEDURE — 99214 OFFICE O/P EST MOD 30 MIN: CPT

## 2018-12-01 PROBLEM — M75.81 RIGHT ROTATOR CUFF TENDONITIS: Status: ACTIVE | Noted: 2018-12-01

## 2018-12-03 LAB
25(OH)D3 SERPL-MCNC: 39.5 NG/ML
ALBUMIN SERPL ELPH-MCNC: 4.6 G/DL
ALP BLD-CCNC: 41 U/L
ALT SERPL-CCNC: 32 U/L
ANION GAP SERPL CALC-SCNC: 15 MMOL/L
AST SERPL-CCNC: 31 U/L
BASOPHILS # BLD AUTO: 0.02 K/UL
BASOPHILS NFR BLD AUTO: 0.4 %
BILIRUB SERPL-MCNC: 0.3 MG/DL
BUN SERPL-MCNC: 20 MG/DL
CALCIUM SERPL-MCNC: 9.7 MG/DL
CHLORIDE SERPL-SCNC: 102 MMOL/L
CO2 SERPL-SCNC: 27 MMOL/L
CREAT SERPL-MCNC: 0.83 MG/DL
CRP SERPL-MCNC: 0.17 MG/DL
EOSINOPHIL # BLD AUTO: 0.12 K/UL
EOSINOPHIL NFR BLD AUTO: 2.3 %
ERYTHROCYTE [SEDIMENTATION RATE] IN BLOOD BY WESTERGREN METHOD: 9 MM/HR
GLUCOSE SERPL-MCNC: 88 MG/DL
HCT VFR BLD CALC: 41.1 %
HGB BLD-MCNC: 12.9 G/DL
IMM GRANULOCYTES NFR BLD AUTO: 0 %
LYMPHOCYTES # BLD AUTO: 2.02 K/UL
LYMPHOCYTES NFR BLD AUTO: 38.2 %
MAN DIFF?: NORMAL
MCHC RBC-ENTMCNC: 29.5 PG
MCHC RBC-ENTMCNC: 31.4 GM/DL
MCV RBC AUTO: 94.1 FL
MONOCYTES # BLD AUTO: 0.45 K/UL
MONOCYTES NFR BLD AUTO: 8.5 %
NEUTROPHILS # BLD AUTO: 2.68 K/UL
NEUTROPHILS NFR BLD AUTO: 50.6 %
PLATELET # BLD AUTO: 241 K/UL
POTASSIUM SERPL-SCNC: 4 MMOL/L
PROT SERPL-MCNC: 7 G/DL
RBC # BLD: 4.37 M/UL
RBC # FLD: 16 %
SODIUM SERPL-SCNC: 144 MMOL/L
WBC # FLD AUTO: 5.29 K/UL

## 2018-12-17 ENCOUNTER — RX RENEWAL (OUTPATIENT)
Age: 53
End: 2018-12-17

## 2018-12-18 ENCOUNTER — MEDICATION RENEWAL (OUTPATIENT)
Age: 53
End: 2018-12-18

## 2019-01-04 NOTE — BRIEF OPERATIVE NOTE - ASSISTANT(S)
Internal Medicine Admission Note    Date of Admission: 1/3/2019  Admitting Provider: Papa Rosario M.D.    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 Per ED verbal report provides history. On my interview the patient's daughters nonsensical speech but responds appropriately to written questions. The patient does have aphasia baseline. Probable report the patient was weak and unsteady this evening ended up driving one of her legs were normal and fell down on her backside. She did hit her head. Reportedly the patient did not have loss of consciousness. PAST MEDICAL HISTORY:  Past Medical History:   Diagnosis Date   â¢ Cerebral embolism with cerebral infarction (CMS/[facility]) 11/26/2010    Left MCA, right hand weakness/altered speech, recovered   â¢ Essential hypertension, benign    â¢ Fracture    â¢ Goiter    â¢ Osteoarthrosis, unspecified whether generalized or localized, lower leg     Knees, bilateral   â¢ Raynaud disease        PAST SURGICAL HISTORY:  Past Surgical History:   Procedure Laterality Date   â¢ Hysterectomy     â¢ Past surgical history  1958    Unspecified anterior neck surgery - thyroid?        MEDICATIONS:  Current Facility-Administered Medications   Medication   â¢ nystatin (MYCOSTATIN) powder   â¢ sodium chloride (PF) 0.9 % injection 2 mL   â¢ sodium chloride (PF) 0.9 % injection 2 mL   â¢ enoxaparin (LOVENOX) injection 30 mg   â¢ sodium chloride 0.9% infusion   â¢ acetaminophen (TYLENOL) tablet 650 mg   â¢ aspirin-dipyridamole (AGGRENOX)  MG extended release capsule 1 capsule       ALLERGIES:  ALLERGIES:   Allergen Reactions   â¢ Amoxil      severe arthralgias- ?serum sickness       FAMILY HISTORY:  Family History   Problem Relation Age of Onset   â¢ Heart Son    â¢ * Son         MS   â¢ Other Sister         recurrent UTIs   â¢ Cancer Brother    â¢
Manjinder zapata Bub
Respiratory Brother        SOCIAL HISTORY:  Social History     Socioeconomic History   â¢ Marital status:      Spouse name: None   â¢ Number of children: None   â¢ Years of education: None   â¢ Highest education level: None   Social Needs   â¢ Financial resource strain: None   â¢ Food insecurity - worry: None   â¢ Food insecurity - inability: None   â¢ Transportation needs - medical: None   â¢ Transportation needs - non-medical: None   Occupational History   â¢ Occupation: retired   Tobacco Use   â¢ Smoking status: Never Smoker   â¢ Smokeless tobacco: Never Used   Substance and Sexual Activity   â¢ Alcohol use: No     Comment: patient denies   â¢ Drug use: No   â¢ Sexual activity: Not Currently     Partners: Male     Comment:  since [address]   Other Topics Concern   â¢  Service No   â¢ Blood Transfusions No   â¢ Caffeine Concern No   â¢ Occupational Exposure No   â¢ Hobby Hazards No   â¢ Sleep Concern No   â¢ Stress Concern No   â¢ Weight Concern Not Asked   â¢ Special Diet No   â¢ Back Care No   â¢ Exercise No   â¢ Bike Helmet Not Asked   â¢ Seat Belt Yes   â¢ Self-Exams Not Asked   Social History Narrative    FARRUKH D [facility] is the patient's son, Eda Soto.        REVIEW OF SYSTEMS: [facility] text indicates positive ROS- otherwise negative)  General - Headache, fever, chills, faintness, dizziness, weight loss and fatigue  HEENT - Vision changes, hearing changes, epistaxis, sinus problems and dysphagia  Pulmonary - Cough, SOB, sputum, hemoptysis and wheezing  CV - Chest pain, syncope, BRUNNER, palpitation, orthopnea and edema  GI - Nausea, vomiting, diarrhea and constipation, bowel habit changes, bleeding, reflux   - Incontinence, dysuria, hematuria, polyuria and discharge  MS - Pain, swelling, muscular weakness and redness  Skin - Rash, pruritus, lesions and jaundice  Neuro - LOC, AMS, seizures, sensory or motor deficiencies  Psych - Anxiety, depression, agitation and dependencies    PHYSICAL EXAM:  Vital 24 Hour Range Most Recent Value
"  Temperature Temp  Min: 97.4 Â°F (36.3 Â°C)  Max: 98.2 Â°F (36.8 Â°C) 97.8 Â°F (36.6 Â°C)     Pulse Pulse  Min: 63  Max: 74 63   Respiratory Resp  Min: 7  Max: 39 18   Blood Pressure BP  Min: 134/65  Max: 195/91 134/65   Pulse Oximetry SpO2  Min: 91 %  Max: 97 %     O2 No Data Recorded       Vital Most Recent Value First Value   Weight 68.3 kg Weight: 70.3 kg   Height 5' 4"" (162.6 cm) Height: 5' 4\"" (162.6 cm)   General: No apparent distress, looks appropriate for age. Heent: NCAT. No nasal or ear discharge noted. No oropharyngeal erythema noted. Oropharyngeal mucosa moist.  Eyes: Extraocular muscle movements are intact. Sclerae nonicteric. Conjunctivae pink. Neck: Supple, range of motion normal. No masses palpable . No thyromegaly. Trachea midline. Lymph: No cervical, supraclavicular, or axillary lymphadenopathy. Cvs: S1-S2 normal. Regular rhythm. No murmurs or gallops heard. No lower extremity edema. Pulses 2+ bilaterally radial and dorsalis pedis. Pulmonary: Normal inspiratory effort. Equal expansion bilaterally. Lungs are clear to auscultation bilaterally. No w/r/r  Abdomen: Normal on inspection. On palpation, abdomen soft and nontender, no masses palpable. Bowel sounds present. Musculoskeletal: No joint swelling redness or tenderness appreciated. Range of motion normal in all 4 extremities. Skin: Warm and dry to touch. No rashes or lesions noted. Neurologic: Alert and oriented x3. No focal neurological deficits noted. Cranial nerve 2-12 grossly intact. Sensation to touch intact. Deep tendon reflexes 2+ all 4 extremities. Strength 5 out 5 in all 4 extremities. Cerebellar function intact. Gait normal.  Psychiatric: Normal mood and affect. No agitation, depression or anxiety noted.       LABS:  Recent Labs   Lab 01/03/19 2110   WBC 9.7   RBC 5.28*   HCT 48.3*   HGB 15.5        Recent Labs   Lab 01/03/19 2110   SODIUM 140   POTASSIUM 4.6   CHLORIDE 103   CO2 28   GLUCOSE 96   BUN 31*   CREATININE 0.98* "
CALCIUM 8.8   ALBUMIN 3.7   BILIRUBIN 0.4   ALKPT 91   AST 44*   GPT 25   INR 1.0     Lab Results   Component Value Date    PTT 25 01/03/2019    MMB 0.7 03/12/2011    MYOGLOBIN 47 03/12/2011    RAPDTR <0.02 01/03/2019    CPK 35 12/17/2007    TSH 0.786 11/18/2015     Urinalysis  Lab Results   Component Value Date    USPG 1.016 01/03/2019    UPROT NEGATIVE 01/03/2019    UWBC TRACE (A) 01/03/2019    URBC SMALL (A) 01/03/2019    UBILI NEGATIVE 01/03/2019    UPH 7.0 01/03/2019    UROB 0.2 01/03/2019    UBACTR LARGE (A) 01/03/2019     Iron Panel  No results found for: IRON, PST    IMAGING:  Ct Cervical Spine    Result Date: 1/3/2019  EXAM: CT CERVICAL SPINE WO CONTRAST. HISTORY: C-SPINE TRAUMA, NEXUS/CCR POSITIVE, LOW RISK. Fall. COMPARISON: No available prior comparison. TECHNIQUE: Noncontrast CT of the cervical spine, with multiplanar reconstructed images. FINDINGS: No prevertebral soft tissue swelling. No evidence for acute cervical spine fracture. Mild spondylotic arthropathy cervical spine, with mild facet and uncovertebral joint arthropathy. Mild thoracolumbar curve. Degenerative changes at the atlantoaxial articulation. Chronic appearing deformity of the spinous process of C7, possibly degenerative or related to previous injury. Partial edentulism. Periapical lucencies and dental caries about/in the remaining teeth. Heterogeneous enlargement and multiple lobulations of the right side of the thyroid gland, with deviation of the airway to the left. This extends into the thoracic inlet. Vascular calcifications. Mild patchy opacities in the right greater than left upper lungs, partly visualized, with mild mosaic attenuation apparent on the right, which may be related to areas of air trapping versus intervening small airways disease. Mild peribronchial thickening. Mild interlobular septal thickening as well, nonspecific possibly scarring/fibrosis. IMPRESSION: 1. No acute cervical spine fracture.  2.  Mild
Vahe [place]
spondylotic degenerative arthritis. 3.  Possible chronic deformity of the spinous process of C7. 4.  Periodontal and dental disease. 5.  Prominent lobulated and nodular enlargement of the right thyroid gland, with mass effect upon the airway. 6.  Possible areas of air trapping versus intervening small airways disease and scarring/atelectasis in the partly visualized upper lungs. Xr Ankle 3+ View Right    Result Date: 1/3/2019  EXAM: XR ANKLE 3+ VW RIGHT INDICATION: Post fall in the kitchen. Right ankle pain. COMPARISON: Right foot radiographs of 2/7/2017. TECHNIQUE: 3 views of the right ankle are obtained. FINDINGS: There is no evidence of fracture. The alignment is satisfactory. Ankle mortise is maintained. There is no joint effusion. There is an old healed fracture of the fifth metatarsal base. IMPRESSION: 1. No acute fracture. Ct Head Level 1    Result Date: 1/3/2019  CT HEAD WITHOUT CONTRAST - LEVEL 1 CLINICAL INFORMATION:   Acute Stroke Alert   ACUTE NEURO DEFICIT (LAST KNOWN WELL < 10H), STROKE SUSPECTED COMPARISON:  3/12/2011 TECHNIQUE:  Routine noncontrast head CT spanning cranial vertex through foramen magnum. Coronal and sagittal reformats FINDINGS:  No evidence of acute intracranial hemorrhage. No evidence of regional hypodensity to suggest acute or evolving infarct in a major vascular territory. MRI is more sensitive for detection of acute ischemia. No evidence of hyperdense intravascular thrombosis. No mass effect, midline shift, or pathologic extra-axial fluid collection identified. Age-related cortical atrophic changes and mild ventricular megaly are present. Fairly extensive chronic small vessel ischemic changes are noted within the periventricular white matter and centrum semiovale regions. No abnormal extra-axial masses or fluid collections are present. The calvarium is intact. Note made of a small posterior right parietal scalp hematoma. IMPRESSION:  1.  No acute
intracranial mass or hemorrhage. No definite CT findings of acute ischemia/infarct. 2. Age-related changes with atrophy and ventriculomegaly. 3. Chronic small vessel ischemic changes. 4. Posterior right parietal scalp hematoma. Findings were called immediately to  Lucinda Castillo MD on 1/3/2019 7:40 PM.       XR Ankle 3+ View Right   Final Result   IMPRESSION:   1. No acute fracture. CT Cervical Spine   Final Result   IMPRESSION:   1. No acute cervical spine fracture. 2.  Mild spondylotic degenerative arthritis. 3.  Possible chronic deformity of the spinous process of C7.   4.  Periodontal and dental disease. 5.  Prominent lobulated and nodular enlargement of the right thyroid gland,   with mass effect upon the airway. 6.  Possible areas of air trapping versus intervening small airways disease   and scarring/atelectasis in the partly visualized upper lungs. CT Head Level 1   Final Result   IMPRESSION:     1. No acute intracranial mass or hemorrhage. No definite CT findings of   acute ischemia/infarct. 2. Age-related changes with atrophy and ventriculomegaly. 3. Chronic small vessel ischemic changes. 4. Posterior right parietal scalp hematoma. Findings were called immediately to  Lucinda Castillo MD on 1/3/2019 7:40   PM.         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 #UTI likely cause of weakness and falling.  -Initially stroke alert was called in the ER but at that time it was determined that patient's symptoms seems more global and likely related to the UTI.  -We'll treat with IV ceftriaxone    #history of CVA with recurrent aphasia  -Aphasia most likely to be residual but with  weakness will obtain MRI of the brain this morning.  Patient was not admitted on the stroke protocol.  -Continue Aggrenox    DVT
Prophylaxis: Enoxaparin      CODE STATUS: Full code   Is the patient expected to require at least a two midnight stay in the hospital? No     Signed:  Noble Cantu MD  1/4/2019

## 2019-01-07 ENCOUNTER — MEDICATION RENEWAL (OUTPATIENT)
Age: 54
End: 2019-01-07

## 2019-02-05 NOTE — PHYSICAL THERAPY INITIAL EVALUATION ADULT - PRECAUTIONS/LIMITATIONS, REHAB EVAL
surgical precautions/fall precautions/right hip precautions/Right LE I personally performed the service described in the documentation recorded by the scribe in my presence, and it accurately and completely records my words and actions.

## 2019-02-26 ENCOUNTER — MEDICATION RENEWAL (OUTPATIENT)
Age: 54
End: 2019-02-26

## 2019-03-01 ENCOUNTER — APPOINTMENT (OUTPATIENT)
Dept: RHEUMATOLOGY | Facility: CLINIC | Age: 54
End: 2019-03-01

## 2019-03-15 ENCOUNTER — APPOINTMENT (OUTPATIENT)
Dept: ORTHOPEDIC SURGERY | Facility: CLINIC | Age: 54
End: 2019-03-15
Payer: COMMERCIAL

## 2019-03-15 VITALS
SYSTOLIC BLOOD PRESSURE: 155 MMHG | HEART RATE: 59 BPM | DIASTOLIC BLOOD PRESSURE: 90 MMHG | WEIGHT: 210 LBS | HEIGHT: 65 IN | BODY MASS INDEX: 34.99 KG/M2

## 2019-03-15 DIAGNOSIS — Z96.641 PRESENCE OF RIGHT ARTIFICIAL HIP JOINT: ICD-10-CM

## 2019-03-15 PROCEDURE — 99213 OFFICE O/P EST LOW 20 MIN: CPT

## 2019-03-15 PROCEDURE — 73502 X-RAY EXAM HIP UNI 2-3 VIEWS: CPT | Mod: RT

## 2019-03-15 PROCEDURE — 72100 X-RAY EXAM L-S SPINE 2/3 VWS: CPT

## 2019-03-15 NOTE — DISCUSSION/SUMMARY
[de-identified] : Right total hip replacement, with lumbar spine degenerative scoliosis, with narrowing L3L4 L5S1, RA\par I have provided the patient with a referral to Dr. Butts for evaluation of the lumbar spine. \par Hip implant is stable. \par The patient was informed of the findings and recommended conservative management in the form of a home exercise program, activity modifications, stationary bicycling, swimming and weight loss program. A trial of Glucosamine and Chondroiten Sulphate was recommended.\par A prescription for non-steroidal anti-inflammatory medication diclofenac was provided and the risks, benefits and side effects were discussed.\par Follow-up appointment was recommended annually.

## 2019-03-15 NOTE — CONSULT LETTER
[Dear  ___] : Dear  [unfilled], [Consult Letter:] : I had the pleasure of evaluating your patient, [unfilled]. [Consult Closing:] : Thank you very much for allowing me to participate in the care of this patient.  If you have any questions, please do not hesitate to contact me. [Sincerely,] : Sincerely, [FreeTextEntry2] : OCTAVIO THOMPSON\par  [FreeTextEntry1] : Right total hip replacement, with lumbar spine degenerative scoliosis, with narrowing L3L4 L5S1, RA\par I have provided the patient with a referral to Dr. Butts for evaluation of the lumbar spine. \par Hip implant is stable. \par The patient was informed of the findings and recommended conservative management in the form of a home exercise program, activity modifications, stationary bicycling, swimming and weight loss program. A trial of Glucosamine and Chondroiten Sulphate was recommended.\par A prescription for non-steroidal anti-inflammatory medication diclofenac was provided and the risks, benefits and side effects were discussed.\par Follow-up appointment was recommended annually.   [FreeTextEntry3] : Juliano Jones MD\par \par ______________________________________________\par [facility]: Hip/Knee Arthroplasty\par [address], [facility] [address], [place] [address]\par (t) [phone]\par (f) [phone]

## 2019-03-15 NOTE — HISTORY OF PRESENT ILLNESS
[3] : an average pain level of 3/10 [de-identified] : Ms. BECCA RANGEL is a 53 year old female status post right total hip replacement 3/13/18, presenting for annual follow up. Patient states she was doing very well up until two weeks ago when she developed some lower back pain. She states the pain radiates down the right leg. She states she has also felt intermittent right groin pain. Patient is taking only OTC NSAIDS for pain and is no longer going to PT.

## 2019-03-15 NOTE — PHYSICAL EXAM
[Normal] : Gait: normal [LE] : Sensory: Intact in bilateral lower extremities [Knee] : patellar 2+ and symmetric bilaterally [Ankle] : ankle 2+ and symmetric bilaterally [DP] : dorsalis pedis 2+ and symmetric bilaterally [PT] : posterior tibial 2+ and symmetric bilaterally [de-identified] : On general examination the patient is adequately groomed and nourished. The vital parameters are as recorded. \par There is no lymphedema or diffuse swelling, no varicose veins, no skin warmth/erythema/scars/swelling, no ulcers and no palpable lymph nodes or masses in both lower extremities. Bilateral pedal pulses are well palpable.\par Upper Extremity:\par Both right and left upper extremities are unremarkable in terms of skin rash, lesions, pigmentation, redness, tenderness, swelling, joint instability, abnormal deformity or crepitus. The overall range of motion, sensation, motor tone and strength testing are normal.\par \par Right hip: Walks with a normal hip gait. There is a well healed scar of surgery with no significant swelling, redness, or tenderness. Range of motion of the hip: active SLR of 60 degrees and hip flexion to 100 degrees Abduction 35 degrees adduction 15 degrees external rotation 35 degrees internal rotation 15 degrees with hip abductor extensor power grade 5.\par \par Spine Exam:\par There is mild curvature of the spine with loss of normal lumbar lordosis. The skin is devoid of erythema, scars, pigmentation or rashes. There is mild lumbar spasm and tenderness especially at L4-L5 or L5-S1. \par The range of motion of the lumbar spine is limited by pain and spasm. Forward flexion is 80% normal, extension catch positive, lateral flexion and rotation 80% normal. There is no lumbar spine imbalance or instability detected.\par Bilateral passive SLR is right 40 degrees, left 40 degrees in supine and sitting positions. Lasegue's Test is negative.\par Neurology:\par The patient is alert and oriented in person, place and time. The mood is calm and affect is normal.\par Testing for coordination including Rhomberg's Test and Finger-Nose Test, sensation, motor tone and power and deep tendon reflexes in both lower extremities is normal. [de-identified] : The following radiographs were ordered and read by me during this patients visit. I reviewed each radiograph in detail with the patient and discussed the findings as highlighted below. \par AP, Lateral, and Sunrise Radiographs of the right hip and pelvis taken today reveal a well fixed and aligned right total hip replacement with no signs of mechanical failure or periprosthetic fracture.\par AP and lateral views of the lumbar spine reveal L5S1 DJD with loss of normal lordosis with degenerative scoliosis. \par

## 2019-03-16 ENCOUNTER — RX RENEWAL (OUTPATIENT)
Age: 54
End: 2019-03-16

## 2019-03-27 ENCOUNTER — APPOINTMENT (OUTPATIENT)
Dept: ORTHOPEDIC SURGERY | Facility: CLINIC | Age: 54
End: 2019-03-27
Payer: COMMERCIAL

## 2019-03-27 VITALS
HEIGHT: 65 IN | HEART RATE: 85 BPM | WEIGHT: 210 LBS | DIASTOLIC BLOOD PRESSURE: 76 MMHG | SYSTOLIC BLOOD PRESSURE: 143 MMHG | BODY MASS INDEX: 34.99 KG/M2

## 2019-03-27 DIAGNOSIS — M41.9 SCOLIOSIS, UNSPECIFIED: ICD-10-CM

## 2019-03-27 DIAGNOSIS — M47.817 SPONDYLOSIS W/OUT MYELOPATHY OR RADICULOPATHY, LUMBOSACRAL REGION: ICD-10-CM

## 2019-03-27 PROCEDURE — 99214 OFFICE O/P EST MOD 30 MIN: CPT

## 2019-03-27 NOTE — HISTORY OF PRESENT ILLNESS
[de-identified] : Ms. BECCA RANGEL  is a 53 year old female who presents with right lumbar/pain that can radiate to the lateral thigh.  She had a right THR about a year ago and is doing well  Her pain is worse with walking but she feels better the more she walk.  Normal bowel and bladder control.   Denies any recent fevers, chills, sweats, weight loss, or infection.\par

## 2019-03-27 NOTE — DISCUSSION/SUMMARY
[de-identified] : She will start with a course of physical therapy. Should her symptoms change or worsen advance imaging would be warranted

## 2019-03-27 NOTE — PHYSICAL EXAM
[de-identified] : Examination of the lumbar spine reveals no midline tenderness palpation, step-offs, or skin lesions. Decreased range of motion with respect to flexion, extension, lateral bending, and rotation. No tenderness to palpation of the sciatic notch. Some tenderness of her right greater trochanter. No pain with passive internal/external rotation of the hips. No instability of bilateral lower extremities.  Negative CRISSY. Negative straight leg raise bilaterally. No bowstring. Negative femoral stretch. 5 out of 5 iliopsoas, hip abductors, hips adductors, quadriceps, hamstrings, gastrocsoleus, tibialis anterior, extensor hallucis longus, peroneals. Grossly intact sensation to light touch bilateral lower extremities. 1+ patellar and Achilles reflexes. Downgoing Babinski. No clonus. Intact proprioception. Palpable pulses. No skin lesion and no edema on the right and left lower extremities. [de-identified] : Review her pain his lumbar x-rays and showed spondylosis with some degenerative scoliosis

## 2019-04-02 ENCOUNTER — APPOINTMENT (OUTPATIENT)
Dept: RHEUMATOLOGY | Facility: CLINIC | Age: 54
End: 2019-04-02
Payer: COMMERCIAL

## 2019-04-02 VITALS
SYSTOLIC BLOOD PRESSURE: 140 MMHG | OXYGEN SATURATION: 98 % | WEIGHT: 205 LBS | HEIGHT: 65 IN | BODY MASS INDEX: 34.16 KG/M2 | DIASTOLIC BLOOD PRESSURE: 86 MMHG | HEART RATE: 87 BPM

## 2019-04-02 DIAGNOSIS — M70.61 TROCHANTERIC BURSITIS, RIGHT HIP: ICD-10-CM

## 2019-04-02 DIAGNOSIS — M76.891 OTHER SPECIFIED ENTHESOPATHIES OF RIGHT LOWER LIMB, EXCLUDING FOOT: ICD-10-CM

## 2019-04-02 PROCEDURE — 99214 OFFICE O/P EST MOD 30 MIN: CPT | Mod: 25

## 2019-04-02 PROCEDURE — 20610 DRAIN/INJ JOINT/BURSA W/O US: CPT | Mod: RT

## 2019-04-02 RX ORDER — METHYLPREDNISOLONE ACETATE 80 MG/ML
80 INJECTION, SUSPENSION INTRA-ARTICULAR; INTRALESIONAL; INTRAMUSCULAR; SOFT TISSUE
Refills: 0 | Status: COMPLETED | OUTPATIENT
Start: 2019-04-02

## 2019-04-02 RX ORDER — AMOXICILLIN AND CLAVULANATE POTASSIUM 500; 125 MG/1; MG/1
500-125 TABLET, FILM COATED ORAL
Qty: 21 | Refills: 0 | Status: DISCONTINUED | COMMUNITY
Start: 2019-02-26 | End: 2019-04-02

## 2019-04-02 RX ORDER — TRAMADOL HYDROCHLORIDE 50 MG/1
50 TABLET, COATED ORAL
Qty: 60 | Refills: 0 | Status: DISCONTINUED | COMMUNITY
Start: 2017-12-15 | End: 2019-04-02

## 2019-04-02 RX ORDER — CYCLOBENZAPRINE HYDROCHLORIDE 5 MG/1
5 TABLET, FILM COATED ORAL
Qty: 60 | Refills: 1 | Status: DISCONTINUED | COMMUNITY
Start: 2018-04-17 | End: 2019-04-02

## 2019-04-02 RX ADMIN — METHYLPREDNISOLONE ACETATE MG/ML: 80 INJECTION, SUSPENSION INTRA-ARTICULAR; INTRALESIONAL; INTRAMUSCULAR; SOFT TISSUE at 00:00

## 2019-04-02 NOTE — ASSESSMENT
[FreeTextEntry1] : 53 year old woman with hx of RA since 2009, hx Vitamin D deficiency, diverticulitis, neuropathy, presents for follow up of her RA. She was previously followed by Dr. Pelletier until he left [place], and had been well maintained on MTX/Folic acid, and PLQ. I added Humira 8/2018 and she is tolerating it well. Overall her RA is stable. \par \par Plan:\par \par RA/Sjogren's\par -Cont MTX 7 tabs q week, folic acid. \par -Cont PLQ 400mg daily for now. \par -Had eye exam 2/2019. \par -Continue Humira.\par -declining any prn steroid course for between visit flares. She will call if she needs\par - Hep B/C/Quantiferon negative 8/14/18\par -Declined Pneumonia vaccination.\par -9/2017 labs VECTRA was in mild range at 29, routine labs unremarkable, inflammatory markers normal. Repeat VECTRA 8/14/18 was 34.\par -Recommend oral hydration and use of rewetting eye drops for secondary sjogrens. Advised compliance with eye drops. \par \par \par Right shoulder rotator cuff tendonitis/impingement- not an issue today\par -declining CS injection today. Doesn't feel it is bad enough at present. \par \par Neuropathy- improved\par -Increase gabapentin to 300mgqAM 300mg qPM and 600mg qHS\par \par Left ankle pain-much improved\par - MRI left ankle which shows multiple tendinopathy, possible deltoid sprain, and some subtalar effusion/retrocalcaneal bursitis.\par -She has done PT. Humira was added. \par -had seen Dr. Hayden, conservative management advised. \par \par Right pes anserine bursitis- stable, currently not active\par -Injected right pes bursa with 80mg depomedrol 4/2018. She found relief with this. Does not want another one at present. \par \par Right GTB- new\par -Inject 80mg depomedrol today 4/2/19\par \par Right hip anterior pain-likely right hip flexor tendonitis\par -PT script given\par \par Low back pain- stable, improved with diclofenac Rx'd by her spine doctor.  \par -On diclofenac\par - Has not required this since last visit. \par \par Vit D deficiency-corrected\par -continue 2000IU daily. \par \par \par Gum irregularity-\par -To ask dentist why left inner gum is more pronounced. Denies hx of smoking ever. \par \par \par f/u 3 months, labs today\par . \par \par

## 2019-04-02 NOTE — HISTORY OF PRESENT ILLNESS
[FreeTextEntry1] : 53 year old woman with hx including RA, vitamin d deficiency, diverticulitis, Right hip replacement 2018, neuropathy, presents for follow up. \par \par She is on MTX 7 tabs q week, tolerating well, no oral sores, hair loss. She is also taking PLQ 200mg BID, last eye exam was 2/2019.  Everything OK medication wise, but possible glaucoma in right eye developing. +Dry eye as well for which she was previously advised rewetting drops. \par \par \par She started Humira 8/31/18.  She feels it helps.  She knows because when she held it for a sinus infection, her joints worsened.  \par Did not feel SSZ did much for her, but only titrated to 500mg BID. \par \par Today she feels fatigue.\par Her back doctor said her lumbar spine is bad, but she could exercise.  She will start PT for the back soon.\par \par Overall feels well. Has 4/10 pain with all day stiffness.  \par She has pain in right groin and radiates down to knee.  \par Her orthopedic told her it could be GTB on right side as she had tenderness there when they examined her.  \par Left ankle occasionally gives her trouble intermittently, but much better than before.  \par \par Since last visit she had Augmentin for sinus infection.  The antibiotic cleared it.  She stopped the humira during that time.  \par \par Dr. Vargas gave her diclofenac 75mg BID. She takes it once/day.  It helps significantly for the back, less so for the right hip issues.  \par She saw her orthopedic for 1 year follow up after Right hip replacement. Was told everything looked good.  \par

## 2019-04-02 NOTE — PROCEDURE
[Today's Date:] : Date: [unfilled] [FreeTextEntry1] : Procedure: Right GTB injection\par \par The procedure risks was discussed with the patient.\par Indications: therapeutic purposes \par #1 site identified in the Right GTB . Verbal consent was obtained. \par Anesthesia was with ethyl chloride.\par The patient was prepped with betadine solution. \par A 25 gauge 1.5 inch needle was used.\par Injectables: Depomedrol 80 mg was injected into the site The Depomedrol was mixed with 1mL of xylocaine 1%. \par The patient tolerated the procedure well..\par There were no complications. Handouts/patient instructions were given to patient . patient was instructed to call if redness at site, a decrease in range of motion or an increase in pain is noted after procedure. \par  \par \par

## 2019-04-03 LAB
25(OH)D3 SERPL-MCNC: 37.5 NG/ML
ALBUMIN SERPL ELPH-MCNC: 4.6 G/DL
ALP BLD-CCNC: 53 U/L
ALT SERPL-CCNC: 31 U/L
ANION GAP SERPL CALC-SCNC: 12 MMOL/L
AST SERPL-CCNC: 22 U/L
BASOPHILS # BLD AUTO: 0.07 K/UL
BASOPHILS NFR BLD AUTO: 1 %
BILIRUB SERPL-MCNC: 0.3 MG/DL
BUN SERPL-MCNC: 24 MG/DL
CALCIUM SERPL-MCNC: 9.8 MG/DL
CHLORIDE SERPL-SCNC: 103 MMOL/L
CO2 SERPL-SCNC: 31 MMOL/L
CREAT SERPL-MCNC: 0.87 MG/DL
CRP SERPL-MCNC: 0.15 MG/DL
EOSINOPHIL # BLD AUTO: 0.17 K/UL
EOSINOPHIL NFR BLD AUTO: 2.5 %
ERYTHROCYTE [SEDIMENTATION RATE] IN BLOOD BY WESTERGREN METHOD: 13 MM/HR
GLUCOSE SERPL-MCNC: 101 MG/DL
HCT VFR BLD CALC: 41.8 %
HGB BLD-MCNC: 13.1 G/DL
IMM GRANULOCYTES NFR BLD AUTO: 0.1 %
LYMPHOCYTES # BLD AUTO: 2.84 K/UL
LYMPHOCYTES NFR BLD AUTO: 41 %
MAN DIFF?: NORMAL
MCHC RBC-ENTMCNC: 29 PG
MCHC RBC-ENTMCNC: 31.3 GM/DL
MCV RBC AUTO: 92.7 FL
MONOCYTES # BLD AUTO: 0.42 K/UL
MONOCYTES NFR BLD AUTO: 6.1 %
NEUTROPHILS # BLD AUTO: 3.41 K/UL
NEUTROPHILS NFR BLD AUTO: 49.3 %
PLATELET # BLD AUTO: 251 K/UL
POTASSIUM SERPL-SCNC: 3.9 MMOL/L
PROT SERPL-MCNC: 7.4 G/DL
RBC # BLD: 4.51 M/UL
RBC # FLD: 15.9 %
SODIUM SERPL-SCNC: 146 MMOL/L
WBC # FLD AUTO: 6.92 K/UL

## 2019-05-01 ENCOUNTER — RX RENEWAL (OUTPATIENT)
Age: 54
End: 2019-05-01

## 2019-05-21 ENCOUNTER — RX RENEWAL (OUTPATIENT)
Age: 54
End: 2019-05-21

## 2019-05-23 ENCOUNTER — MEDICATION RENEWAL (OUTPATIENT)
Age: 54
End: 2019-05-23

## 2019-06-10 ENCOUNTER — MEDICATION RENEWAL (OUTPATIENT)
Age: 54
End: 2019-06-10

## 2019-06-10 ENCOUNTER — RX RENEWAL (OUTPATIENT)
Age: 54
End: 2019-06-10

## 2019-07-09 ENCOUNTER — APPOINTMENT (OUTPATIENT)
Dept: RHEUMATOLOGY | Facility: CLINIC | Age: 54
End: 2019-07-09

## 2019-07-22 ENCOUNTER — RX RENEWAL (OUTPATIENT)
Age: 54
End: 2019-07-22

## 2019-07-30 ENCOUNTER — APPOINTMENT (OUTPATIENT)
Dept: RHEUMATOLOGY | Facility: CLINIC | Age: 54
End: 2019-07-30
Payer: COMMERCIAL

## 2019-07-30 VITALS
SYSTOLIC BLOOD PRESSURE: 128 MMHG | HEART RATE: 71 BPM | WEIGHT: 205 LBS | DIASTOLIC BLOOD PRESSURE: 83 MMHG | OXYGEN SATURATION: 98 % | BODY MASS INDEX: 34.16 KG/M2 | HEIGHT: 65 IN

## 2019-07-30 PROCEDURE — 36415 COLL VENOUS BLD VENIPUNCTURE: CPT

## 2019-07-30 PROCEDURE — 99214 OFFICE O/P EST MOD 30 MIN: CPT | Mod: 25

## 2019-07-30 NOTE — HISTORY OF PRESENT ILLNESS
[FreeTextEntry1] : 53 year old woman with hx including RA, vitamin d deficiency, diverticulitis, Right hip replacement 2018, neuropathy, presents for follow up. \par \par She is on MTX 7 tabs q week, tolerating well, no oral sores, hair loss. She is also taking PLQ 200mg BID, last eye exam was 2/2019. Everything OK medication wise, but possible glaucoma in right eye developing. +Dry eye as well for which she was previously advised rewetting drops.\par \par She started Humira 8/31/18. She feels it helps. She knows because when she held it for a sinus infection, her joints worsened. \par Did not feel SSZ did much for her, but only titrated to 500mg BID. \par \par She gets pain in left upper neck, C7\par She sees Chriopractor who adjusts her\par \par Overall feels well. Has 4/10 pain with 1 hour of AM stiffness.  neck/shoulders, groin, inner left ankle.  \par \par She feels the gabapentin has helped her zings/throbs.  She noticed it worked better in the beginning.  Doesn't make her drowsy.  Takes 600mg QHS\par \par Diclofenac 75mg helps once every 2 weeks on average.  \par \par No signif dry mouth recently.

## 2019-07-30 NOTE — ASSESSMENT
[FreeTextEntry1] : 53 year old woman with hx of RA since 2009, hx Vitamin D deficiency, diverticulitis, neuropathy, presents for follow up of her RA. She was previously followed by Dr. Pelletier until he left [place], and had been well maintained on MTX/Folic acid, and PLQ. I added Humira 8/2018 and she is tolerating it well. Overall her RA is stable. \par \par Plan:\par \par RA/Sjogren's\par -Cont MTX 7 tabs q week, folic acid. \par -Cont PLQ 400mg daily for now. \par -Had eye exam 2/2019. \par -Continue Humira.  Consider increased dosing to q10 days if elevated inflammatory markers or VECTRA worsened.  -declining any prn steroid course for between visit flares. She will call if she needs\par - Hep B/C/Quantiferon negative 8/14/18\par -Declined Pneumonia vaccination.\par -9/2017 labs VECTRA was in mild range at 29, routine labs unremarkable, inflammatory markers normal. Repeat VECTRA 8/14/18 was 34.\par -Recommend oral hydration and use of rewetting eye drops for secondary sjogrens. Advised compliance with eye drops. No sign dry mouth today.  \par \par \par Right shoulder rotator cuff tendonitis/impingement- not an issue today\par -declining CS injection today. Doesn't feel it is bad enough at present. \par \par neck pain\par -Rx tizanidine 2mg QHS\par -Had c-spine XR 2/22/18.  Has C5-C6 retrolisthesis, no evidenc at that time for atlantoaxial instability.  +Lordosis reversal related to muscle spasm and/or underlying degenerative change. \par -If persistent, may need re-imaging.  \par \par Neuropathy- improved\par -Increase gabapentin to  600mg BID\par \par Left ankle pain-seems to be slightly worsened agin\par - MRI left ankle which shows multiple tendinopathy, possible deltoid sprain, and some subtalar effusion/retrocalcaneal bursitis.\par -She has done PT.   \par -had seen Dr. Hayden, conservative management advised. \par -Inc in humira dosing freq may help this.  f/u labs/VECTRA\par -use diclofenac as needed\par \par Right pes anserine bursitis- stable, currently not active\par -Injected right pes bursa with 80mg depomedrol 4/2018. She found relief with this. Does not want another one at present. \par \par Right GTB-improved\par -Inject 80mg depomedrol today 4/2/19\par \par Right hip anterior pain-likely right hip flexor tendonitis\par -PT script given\par \par Low back pain- stable, improved with diclofenac Rx'd by her spine doctor. \par -diclofenac PRN\par -Tizanidine 2mg QHS\par \par Vit D deficiency-corrected\par -continue 2000IU daily. \par \par \par Gum irregularity-\par -To ask dentist why left inner gum is more pronounced. Denies hx of smoking ever. \par \par \par f/u 3 months, labs today\par

## 2019-07-31 LAB
25(OH)D3 SERPL-MCNC: 40.3 NG/ML
ALBUMIN SERPL ELPH-MCNC: 4.6 G/DL
ALP BLD-CCNC: 48 U/L
ALT SERPL-CCNC: 20 U/L
ANION GAP SERPL CALC-SCNC: 13 MMOL/L
AST SERPL-CCNC: 22 U/L
BASOPHILS # BLD AUTO: 0.06 K/UL
BASOPHILS NFR BLD AUTO: 1 %
BILIRUB SERPL-MCNC: 0.3 MG/DL
BUN SERPL-MCNC: 27 MG/DL
CALCIUM SERPL-MCNC: 9.6 MG/DL
CHLORIDE SERPL-SCNC: 105 MMOL/L
CO2 SERPL-SCNC: 27 MMOL/L
CREAT SERPL-MCNC: 0.94 MG/DL
CRP SERPL-MCNC: 0.16 MG/DL
EOSINOPHIL # BLD AUTO: 0.19 K/UL
EOSINOPHIL NFR BLD AUTO: 3.3 %
ERYTHROCYTE [SEDIMENTATION RATE] IN BLOOD BY WESTERGREN METHOD: 13 MM/HR
GLUCOSE SERPL-MCNC: 92 MG/DL
HCT VFR BLD CALC: 43.1 %
HGB BLD-MCNC: 13 G/DL
IMM GRANULOCYTES NFR BLD AUTO: 0.2 %
LYMPHOCYTES # BLD AUTO: 1.93 K/UL
LYMPHOCYTES NFR BLD AUTO: 33.2 %
MAN DIFF?: NORMAL
MCHC RBC-ENTMCNC: 28.6 PG
MCHC RBC-ENTMCNC: 30.2 GM/DL
MCV RBC AUTO: 94.9 FL
MONOCYTES # BLD AUTO: 0.37 K/UL
MONOCYTES NFR BLD AUTO: 6.4 %
NEUTROPHILS # BLD AUTO: 3.26 K/UL
NEUTROPHILS NFR BLD AUTO: 55.9 %
PLATELET # BLD AUTO: 226 K/UL
POTASSIUM SERPL-SCNC: 4.4 MMOL/L
PROT SERPL-MCNC: 7.1 G/DL
RBC # BLD: 4.54 M/UL
RBC # FLD: 16.2 %
SODIUM SERPL-SCNC: 145 MMOL/L
WBC # FLD AUTO: 5.82 K/UL

## 2019-08-08 ENCOUNTER — RX RENEWAL (OUTPATIENT)
Age: 54
End: 2019-08-08

## 2019-08-09 ENCOUNTER — MEDICATION RENEWAL (OUTPATIENT)
Age: 54
End: 2019-08-09

## 2019-09-10 ENCOUNTER — RX RENEWAL (OUTPATIENT)
Age: 54
End: 2019-09-10

## 2019-09-24 ENCOUNTER — RX RENEWAL (OUTPATIENT)
Age: 54
End: 2019-09-24

## 2019-09-26 NOTE — H&P PST ADULT - NSWEIGHTCALCTOOLDRUG_GEN_A_CORE
Discussed condition and exacerbating conditions/situations (e.g., dry/arid environments, overhead fans, air conditioners, side effect of medications).  used

## 2019-10-23 ENCOUNTER — RX RENEWAL (OUTPATIENT)
Age: 54
End: 2019-10-23

## 2019-10-25 ENCOUNTER — RX RENEWAL (OUTPATIENT)
Age: 54
End: 2019-10-25

## 2019-10-30 ENCOUNTER — APPOINTMENT (OUTPATIENT)
Dept: RHEUMATOLOGY | Facility: CLINIC | Age: 54
End: 2019-10-30
Payer: COMMERCIAL

## 2019-10-30 VITALS
OXYGEN SATURATION: 99 % | SYSTOLIC BLOOD PRESSURE: 112 MMHG | WEIGHT: 210 LBS | HEART RATE: 65 BPM | HEIGHT: 65 IN | DIASTOLIC BLOOD PRESSURE: 72 MMHG | BODY MASS INDEX: 34.99 KG/M2

## 2019-10-30 PROCEDURE — 99214 OFFICE O/P EST MOD 30 MIN: CPT

## 2019-10-30 RX ORDER — HYDROCHLOROTHIAZIDE 25 MG/1
25 TABLET ORAL
Qty: 30 | Refills: 0 | Status: DISCONTINUED | COMMUNITY
Start: 2017-04-30 | End: 2019-10-30

## 2019-10-31 NOTE — ASSESSMENT
[FreeTextEntry1] : 54 year old woman with hx of RA since 2009, hx Vitamin D deficiency, diverticulitis, neuropathy, presents for follow up of her RA. She was previously followed by Dr. Pelletier until he left [place], and had been well maintained on MTX/Folic acid, and PLQ. I added Humira 8/2018 and she is tolerating it well. Overall her RA is stable. \par \par Plan:\par \par RA/Sjogren's\par -Cont MTX 7 tabs q week, folic acid. \par -Cont PLQ 400mg daily for now. \par -Had eye exam 2/2019. \par -Continue Humira. Increase dosing to q12 days if elevated inflammatory markers or VECTRA worsened. -declining any prn steroid course for between visit flares. She will call if she needs\par - Hep B/C/Quantiferon negative 8/14/18.  Needs updating.  \par -Declined Pneumonia vaccination.  Declined flu shot.  \par -9/2017 labs VECTRA was in mild range at 29, routine labs unremarkable, inflammatory markers normal. Repeat VECTRA 8/14/18 was 34.\par -Recommend oral hydration and use of rewetting eye drops for secondary sjogrens. Advised compliance with eye drops. No sign dry mouth today. \par \par \par Right shoulder rotator cuff tendonitis/impingement- not an issue today\par -declining CS injection today. Doesn't feel it is bad enough at present. \par \par neck pain/Low back pain- stable, improved with diclofenac Rx'd by her spine doctor. \par -Rxd tizanidine 2mg QHS PRN\par -Had c-spine XR 2/22/18. Has C5-C6 retrolisthesis, no evidenc at that time for atlantoaxial instability. +Lordosis reversal related to muscle spasm and/or underlying degenerative change. \par -If persistent, may need re-imaging. \par \par Neuropathy- improved\par -cont Increased gabapentin at 600mg BID.  \par \par Secondary FMS\par -duloxetine 20mg daily\par \par Left ankle pain-seems to be slightly worsened agin\par -Rx diclofenac 75mg BID x 2 weeks.  Prn thereafter. \par -Change shoes.  Feels flats worsen this.  \par - MRI left ankle which shows multiple tendinopathy, possible deltoid sprain, and some subtalar effusion/retrocalcaneal bursitis.\par -She has done PT. \par -had seen Dr. Hayden, conservative management advised. \par -Inc in humira dosing freq may help this. f/u labs/VECTRA\par \par Right pes anserine bursitis- stable, currently not active\par -Injected right pes bursa with 80mg depomedrol 4/2018. She found relief with this. Does not want another one at present. \par \par Knee pain- crepitus appreciated, likely with OA\par -Knee XRs\par -Diclofenac\par -Consider HA injections\par \par Right GTB-improved\par -Injected 80mg depomedrol  4/2/19\par \par Right hip anterior pain-likely right hip flexor tendonitis--< not active today\par -PT script given previously\par \par \par \par Vit D deficiency-corrected\par -continue 2000IU daily. \par \par \par Gum irregularity-\par -To ask dentist why left inner gum is more pronounced. Denies hx of smoking ever. \par \par Plantar fasciitis\par -Use plantar splints nightly\par \par f/u 2 months, labs today

## 2019-10-31 NOTE — HISTORY OF PRESENT ILLNESS
[FreeTextEntry1] : 54 year old woman with hx including RA, vitamin d deficiency, diverticulitis, Right hip replacement 2018, neuropathy, presents for follow up. \par \par She is on MTX 7 tabs q week, tolerating well, no oral sores, hair loss. She is also taking PLQ 200mg BID, last eye exam was 2/2019. Everything OK medication wise, but possible glaucoma in right eye developing. +Dry eye as well for which she was previously advised rewetting drops.\par \par She started Humira 8/31/18. She feels it helps\par Did not feel SSZ did much for her, but only titrated to 500mg BID. \par \par Overall feels well. Has 5-6/10 pain with all day stiffness.\par She feels her ankle is acting up again.  She thinks it's related to walking in flats/sandals over the summer.  \par She also has some aching in left hand over MCPs.  \par Last labs in 7/2019 showed normal inflammatory markers. VECTRA was 29.  \par \par She gets pain in left upper neck, C7\par She sees Chiropractor who adjusts her\par \par She feels the gabapentin has helped her zings/throbs. She noticed it worked better in the beginning. Doesn't make her drowsy. Takes 600mg BID.  She feels it has helped because when she doesn't take it, she has worsened pain in the middle of the night. \par \par Her knees also bother her from time to time . \par \par No signif dry mouth recently. \par \par She is declining flu shot.

## 2019-11-19 ENCOUNTER — RX RENEWAL (OUTPATIENT)
Age: 54
End: 2019-11-19

## 2019-11-19 LAB
ALBUMIN SERPL ELPH-MCNC: 4.6 G/DL
ALP BLD-CCNC: 55 U/L
ALT SERPL-CCNC: 16 U/L
ANION GAP SERPL CALC-SCNC: 14 MMOL/L
AST SERPL-CCNC: 22 U/L
BASOPHILS # BLD AUTO: 0.05 K/UL
BASOPHILS NFR BLD AUTO: 0.7 %
BILIRUB SERPL-MCNC: 0.2 MG/DL
BUN SERPL-MCNC: 23 MG/DL
CALCIUM SERPL-MCNC: 9.9 MG/DL
CHLORIDE SERPL-SCNC: 103 MMOL/L
CO2 SERPL-SCNC: 26 MMOL/L
CREAT SERPL-MCNC: 0.99 MG/DL
CRP SERPL-MCNC: 0.26 MG/DL
EOSINOPHIL # BLD AUTO: 0.23 K/UL
EOSINOPHIL NFR BLD AUTO: 3.4 %
ERYTHROCYTE [SEDIMENTATION RATE] IN BLOOD BY WESTERGREN METHOD: 38 MM/HR
GLUCOSE SERPL-MCNC: 91 MG/DL
HBV CORE IGG+IGM SER QL: NONREACTIVE
HBV SURFACE AB SERPL IA-ACNC: <3 MIU/ML
HBV SURFACE AG SER QL: NONREACTIVE
HCT VFR BLD CALC: 40.9 %
HCV AB SER QL: NONREACTIVE
HCV S/CO RATIO: 0.13 S/CO
HGB BLD-MCNC: 13 G/DL
IMM GRANULOCYTES NFR BLD AUTO: 0.1 %
LYMPHOCYTES # BLD AUTO: 2.29 K/UL
LYMPHOCYTES NFR BLD AUTO: 34.2 %
M TB IFN-G BLD-IMP: NEGATIVE
MAN DIFF?: NORMAL
MCHC RBC-ENTMCNC: 28.9 PG
MCHC RBC-ENTMCNC: 31.8 GM/DL
MCV RBC AUTO: 90.9 FL
MONOCYTES # BLD AUTO: 0.45 K/UL
MONOCYTES NFR BLD AUTO: 6.7 %
NEUTROPHILS # BLD AUTO: 3.67 K/UL
NEUTROPHILS NFR BLD AUTO: 54.9 %
PLATELET # BLD AUTO: 253 K/UL
POTASSIUM SERPL-SCNC: 4.2 MMOL/L
PROT SERPL-MCNC: 7.4 G/DL
QUANTIFERON TB PLUS MITOGEN MINUS NIL: >10 IU/ML
QUANTIFERON TB PLUS NIL: 0.04 IU/ML
QUANTIFERON TB PLUS TB1 MINUS NIL: 0 IU/ML
QUANTIFERON TB PLUS TB2 MINUS NIL: 0.01 IU/ML
RBC # BLD: 4.5 M/UL
RBC # FLD: 15.1 %
SODIUM SERPL-SCNC: 143 MMOL/L
WBC # FLD AUTO: 6.7 K/UL

## 2019-12-05 ENCOUNTER — RX RENEWAL (OUTPATIENT)
Age: 54
End: 2019-12-05

## 2019-12-17 ENCOUNTER — RX RENEWAL (OUTPATIENT)
Age: 54
End: 2019-12-17

## 2020-01-02 ENCOUNTER — RX RENEWAL (OUTPATIENT)
Age: 55
End: 2020-01-02

## 2020-01-09 ENCOUNTER — APPOINTMENT (OUTPATIENT)
Dept: RHEUMATOLOGY | Facility: CLINIC | Age: 55
End: 2020-01-09
Payer: COMMERCIAL

## 2020-01-09 VITALS
DIASTOLIC BLOOD PRESSURE: 71 MMHG | HEIGHT: 65 IN | HEART RATE: 84 BPM | OXYGEN SATURATION: 96 % | BODY MASS INDEX: 35.49 KG/M2 | WEIGHT: 213 LBS | SYSTOLIC BLOOD PRESSURE: 111 MMHG

## 2020-01-09 PROCEDURE — 99214 OFFICE O/P EST MOD 30 MIN: CPT

## 2020-01-09 RX ORDER — TIZANIDINE 2 MG/1
2 TABLET ORAL
Qty: 30 | Refills: 0 | Status: DISCONTINUED | COMMUNITY
Start: 2019-07-30 | End: 2020-01-09

## 2020-01-09 NOTE — HISTORY OF PRESENT ILLNESS
[FreeTextEntry1] : 54 year old woman with hx including RA, vitamin d deficiency, diverticulitis, Right hip replacement 2018, neuropathy, presents for follow up. \par \par She is on MTX 7 tabs q week, tolerating well, no oral sores, hair loss. She is also taking PLQ 200mg, but self weaned to 200mg daily, last eye exam was 2/2019. Everything OK medication wise, but possible glaucoma in right eye developing. +Dry eye as well for which she was previously advised rewetting drops.\par \par She started Humira 8/31/18. She feels it helps.  She had to hold it for URI.  She missed a month of humira.  Had most recent shot 1/3/2020.  It helped when she restarted it.  \par She has been on plq one tablet, not sure when that happened.\par \par Overall feels slightly worse than last visit.  Has 8/10 pain with all day stiffness. Pain in left shoulder, knees, ankles . \par \par Didn't take tizanidine, never started, didn't feel she wanted to take another med.\par Didn't take duloxetine. She didn't realize she was supposed to start.  \par \par left shoulder supraspinatus pain left shoulder POM\par hands no active synovitis\par pain about ankles, pain on MTP squeeze\par knees crepitus\par no dry mouth\par

## 2020-01-09 NOTE — ASSESSMENT
[FreeTextEntry1] : 54 year old woman with hx of RA since 2009, hx Vitamin D deficiency, diverticulitis, neuropathy, presents for follow up of her RA. She was previously followed by Dr. Pelletier until he left [place], and had been well maintained on MTX/Folic acid, and PLQ. I added Humira 8/2018 and she is tolerating it well. Overall her RA is slightly active owing to having needed to stop her humira for URI, but also because she self decreased PLQ.  \par \par Plan:\par \par RA/Sjogren's\par -Cont MTX 7 tabs q week, folic acid. \par -Inc PLQ back to 400mg total daily for now. \par -Had eye exam 2/2019. \par -Continue Humira. Increase dosing to q12 days.\par - Hep B/C/Quantiferon negative 11/16/19. \par -Declined Pneumonia vaccination. Declined flu shot. \par -9/2017 labs VECTRA was in mild range at 29, routine labs unremarkable, inflammatory markers normal. Repeat VECTRA 8/14/18 was 34.\par -Recommend oral hydration and use of rewetting eye drops for secondary sjogrens. Advised compliance with eye drops. No sign dry mouth today. \par \par \par Right shoulder rotator cuff tendonitis/impingement- not an issue today\par -declining CS injection today. Doesn't feel it is bad enough at present. \par \par neck pain/Low back pain- stable, improved with diclofenac Rx'd by her spine doctor. \par -Rxd tizanidine 2mg QHS PRN, though she never took it due to "medication averse" in general.\par -Had c-spine XR 2/22/18. Has C5-C6 retrolisthesis, no evidenc at that time for atlantoaxial instability. +Lordosis reversal related to muscle spasm and/or underlying degenerative change. \par -If persistent, may need re-imaging. \par \par Neuropathy- improved\par -cont Increased gabapentin at 600mg BID. \par \par Secondary FMS\par -duloxetine 20mg daily\par \par Left ankle pain-seems to be slightly worsened agin\par -Rx diclofenac 75mg BID x 2 weeks. Prn thereafter. Reminded her she has this option for prn.\par -Change shoes. Feels flats worsen this. \par - MRI left ankle which shows multiple tendinopathy, possible deltoid sprain, and some subtalar effusion/retrocalcaneal bursitis.\par -She has done PT. \par -had seen Dr. Hayden, conservative management advised. \par -Inc in humira dosing freq may help this. f/u labs/VECTRA\par \par Right pes anserine bursitis- stable, currently not active\par -Injected right pes bursa with 80mg depomedrol 4/2018. She found relief with this. Does not want another one at present. \par \par Knee pain- crepitus appreciated, likely with OA\par -Knee XRs->has yet to do this\par -Diclofenac\par -Consider HA injections\par \par Right GTB-improved\par -Injected 80mg depomedrol 4/2/19\par \par Right hip anterior pain-likely right hip flexor tendonitis--< not active today\par -PT script given previously\par \par \par \par Vit D deficiency-corrected\par -continue 2000IU daily. \par \par \par Gum irregularity-\par -To ask dentist why left inner gum is more pronounced. Denies hx of smoking ever. \par \par Plantar fasciitis\par -Use plantar splints nightly\par \par f/u 2-3 months, labs q 3 months\par

## 2020-01-23 ENCOUNTER — RX RENEWAL (OUTPATIENT)
Age: 55
End: 2020-01-23

## 2020-01-25 ENCOUNTER — RX RENEWAL (OUTPATIENT)
Age: 55
End: 2020-01-25

## 2020-03-03 LAB
25(OH)D3 SERPL-MCNC: 54.7 NG/ML
ALBUMIN SERPL ELPH-MCNC: 4.6 G/DL
ALP BLD-CCNC: 50 U/L
ALT SERPL-CCNC: 15 U/L
ANION GAP SERPL CALC-SCNC: 15 MMOL/L
AST SERPL-CCNC: 19 U/L
BASOPHILS # BLD AUTO: 0.06 K/UL
BASOPHILS NFR BLD AUTO: 1 %
BILIRUB SERPL-MCNC: 0.4 MG/DL
BUN SERPL-MCNC: 20 MG/DL
CALCIUM SERPL-MCNC: 9.9 MG/DL
CHLORIDE SERPL-SCNC: 100 MMOL/L
CO2 SERPL-SCNC: 26 MMOL/L
CREAT SERPL-MCNC: 1.09 MG/DL
CRP SERPL-MCNC: 0.11 MG/DL
EOSINOPHIL # BLD AUTO: 0.18 K/UL
EOSINOPHIL NFR BLD AUTO: 3.1 %
ERYTHROCYTE [SEDIMENTATION RATE] IN BLOOD BY WESTERGREN METHOD: 29 MM/HR
GLUCOSE SERPL-MCNC: 89 MG/DL
HCT VFR BLD CALC: 41.5 %
HGB BLD-MCNC: 13.2 G/DL
IMM GRANULOCYTES NFR BLD AUTO: 0.2 %
LYMPHOCYTES # BLD AUTO: 2.4 K/UL
LYMPHOCYTES NFR BLD AUTO: 41.8 %
MAN DIFF?: NORMAL
MCHC RBC-ENTMCNC: 29.2 PG
MCHC RBC-ENTMCNC: 31.8 GM/DL
MCV RBC AUTO: 91.8 FL
MONOCYTES # BLD AUTO: 0.38 K/UL
MONOCYTES NFR BLD AUTO: 6.6 %
NEUTROPHILS # BLD AUTO: 2.71 K/UL
NEUTROPHILS NFR BLD AUTO: 47.3 %
PLATELET # BLD AUTO: 265 K/UL
POTASSIUM SERPL-SCNC: 4.3 MMOL/L
PROT SERPL-MCNC: 7 G/DL
RBC # BLD: 4.52 M/UL
RBC # FLD: 15.5 %
SODIUM SERPL-SCNC: 141 MMOL/L
WBC # FLD AUTO: 5.74 K/UL

## 2020-03-04 ENCOUNTER — RX RENEWAL (OUTPATIENT)
Age: 55
End: 2020-03-04

## 2020-04-13 ENCOUNTER — RX RENEWAL (OUTPATIENT)
Age: 55
End: 2020-04-13

## 2020-04-21 ENCOUNTER — RX RENEWAL (OUTPATIENT)
Age: 55
End: 2020-04-21

## 2020-04-28 ENCOUNTER — RX RENEWAL (OUTPATIENT)
Age: 55
End: 2020-04-28

## 2020-05-04 ENCOUNTER — RX RENEWAL (OUTPATIENT)
Age: 55
End: 2020-05-04

## 2020-05-28 ENCOUNTER — APPOINTMENT (OUTPATIENT)
Dept: RHEUMATOLOGY | Facility: CLINIC | Age: 55
End: 2020-05-28
Payer: COMMERCIAL

## 2020-05-28 VITALS
OXYGEN SATURATION: 97 % | HEIGHT: 65 IN | SYSTOLIC BLOOD PRESSURE: 120 MMHG | DIASTOLIC BLOOD PRESSURE: 80 MMHG | WEIGHT: 208 LBS | HEART RATE: 64 BPM | BODY MASS INDEX: 34.66 KG/M2 | TEMPERATURE: 98.3 F

## 2020-05-28 PROCEDURE — 99214 OFFICE O/P EST MOD 30 MIN: CPT | Mod: 25

## 2020-05-28 PROCEDURE — 20610 DRAIN/INJ JOINT/BURSA W/O US: CPT | Mod: LT

## 2020-05-28 RX ORDER — METHYLPRED ACET/NACL,ISO-OS/PF 80 MG/ML
80 VIAL (ML) INJECTION
Qty: 1 | Refills: 0 | Status: COMPLETED | OUTPATIENT
Start: 2020-05-28

## 2020-05-28 RX ORDER — TRIAMTERENE AND HYDROCHLOROTHIAZIDE 25; 37.5 MG/1; MG/1
37.5-25 TABLET ORAL
Qty: 90 | Refills: 0 | Status: ACTIVE | COMMUNITY
Start: 2020-04-15

## 2020-05-28 RX ADMIN — METHYLPREDNISOLONE ACETATE 0 MG/ML: 80 INJECTION, SUSPENSION INTRA-ARTICULAR; INTRALESIONAL; INTRAMUSCULAR; SOFT TISSUE at 00:00

## 2020-05-28 NOTE — PROCEDURE
[FreeTextEntry1] : Procedure: Left knee IA CS injection\par Date: 5/28/20\par The procedure risks was discussed with the patient.\par Indications: therapeutic purposes \par #1 site identified in the Left knee . Verbal consent was obtained. \par Anesthesia was with ethyl chloride.\par The patient was prepped with betadine solution. \par A 21 gauge 1.5 inch needle was used.\par No fluid aspirated, dry tap.  Aspiration attempted.  \par Injectables: Depomedrol 80 mg was injected into the site The Depomedrol was mixed with 1mL of xylocaine 1%. \par The patient tolerated the procedure well..\par There were no complications. Handouts/patient instructions were given to patient . patient was instructed to call if redness at site, a decrease in range of motion or an increase in pain is noted after procedure. \par  \par \par

## 2020-05-28 NOTE — HISTORY OF PRESENT ILLNESS
[FreeTextEntry1] : 55 year old woman with hx including RA, vitamin d deficiency, diverticulitis, Right hip replacement 2018, neuropathy, presents for follow up. \par \par She is on MTX 7 tabs q week, tolerating well, no oral sores, hair loss. She is also taking PLQ 200mg, but self weaned to 200mg daily, last eye exam was 2/2019. Everything OK medication wise, but possible glaucoma in right eye developing. +Dry eye as well for which she was previously advised rewetting drops.\par \par She started Humira 8/31/18. She feels it helps. She is now taking it q10-12 days.  \par \par Overall feels slightly worse than last visit. Has 8/10 pain with all day stiffness. Pain in l knees, right hand knuckles and right wrist >L, ankles R>L. \par \par Didn't take tizanidine, never started, didn't feel she wanted to take another med.\par Feels duloxetine was helpful, she wants to continue this. \par \par Feels gabapentin helped her zings/throbs, but now acting up again. She takes 600mgBID of gabapentin.  \par

## 2020-05-28 NOTE — ASSESSMENT
[FreeTextEntry1] : \par 55 year old woman with hx of RA since 2009, hx Vitamin D deficiency, diverticulitis, neuropathy, presents for follow up of her RA. She was previously followed by Dr. Pelletier until he left [place], and had been well maintained on MTX/Folic acid, and PLQ. I added Humira 8/2018 and she is tolerating it well. Overall her RA is slightly active. \par \par Plan:\par \par RA/Sjogren's\par -Left knee CS injection 80mg depomedrol\par -Cont MTX 7 tabs q week, folic acid. \par -Inc PLQ back to 400mg total daily for now. \par -Had eye exam 2/2019. \par -Continue Humira. Incr dosing frequency to q10 days.\par - Hep B/C/Quantiferon negative 11/16/19. \par -Declined Pneumonia vaccination. Declined flu shot. \par -9/2017 labs VECTRA was in mild range at 29, routine labs unremarkable, inflammatory markers normal. Repeat VECTRA 8/14/18 was 34.\par -Recommend oral hydration and use of rewetting eye drops for secondary sjogrens. Advised compliance with eye drops. No sign dry mouth today. \par \par \par Right shoulder rotator cuff tendonitis/impingement- not an issue today\par -declining CS injection today. Doesn't feel it is bad enough at present. \par \par neck pain/Low back pain- stable, improved with diclofenac Rx'd by her spine doctor. \par -Rxd tizanidine 2mg QHS PRN, though she never took it due to "medication averse" in general.\par -Had c-spine XR 2/22/18. Has C5-C6 retrolisthesis, no evidenc at that time for atlantoaxial instability. +Lordosis reversal related to muscle spasm and/or underlying degenerative change. \par -If persistent, may need re-imaging. \par \par Neuropathy- improved\par -increase gabapentin at [phone] in each day. \par \par Secondary FMS\par -restart duloxetine 20mg daily\par \par Left ankle pain-seems to be stable\par -Rx diclofenac 75mg BID Prn. Reminded her she has this option for prn.\par -Change shoes. Feels flats worsen this. \par - MRI left ankle which shows multiple tendinopathy, possible deltoid sprain, and some subtalar effusion/retrocalcaneal bursitis.\par -She has done PT. \par -had seen Dr. Hayden, conservative management advised. \par -Inc in humira dosing freq may help this. f/u labs/VECTRA\par \par Right pes anserine bursitis- stable, currently not active\par -Injected right pes bursa with 80mg depomedrol 4/2018. She found relief with this. Does not want another one at present. \par \par Knee pain- crepitus appreciated, likely with OA\par -Knee XRs->has yet to do this\par -Injected left knee with 80mg depomedrol today\par -Consider HA injections\par \par Right GTB-improved\par -Injected 80mg depomedrol 4/2/19\par \par Right hip anterior pain-likely right hip flexor tendonitis--< not active today\par -PT script given previously\par \par \par \par Vit D deficiency-corrected\par -continue 2000IU daily. \par \par \par Gum irregularity-\par -To ask dentist why left inner gum is more pronounced. Denies hx of smoking ever. \par \par Plantar fasciitis- not active\par -Use plantar splints nightly\par \par f/u 3 months, labs today, and then q 3 months \par

## 2020-05-29 ENCOUNTER — RX RENEWAL (OUTPATIENT)
Age: 55
End: 2020-05-29

## 2020-06-16 ENCOUNTER — RX RENEWAL (OUTPATIENT)
Age: 55
End: 2020-06-16

## 2020-06-29 ENCOUNTER — RX RENEWAL (OUTPATIENT)
Age: 55
End: 2020-06-29

## 2020-07-21 ENCOUNTER — RX RENEWAL (OUTPATIENT)
Age: 55
End: 2020-07-21

## 2020-08-04 ENCOUNTER — APPOINTMENT (OUTPATIENT)
Dept: RHEUMATOLOGY | Facility: CLINIC | Age: 55
End: 2020-08-04
Payer: COMMERCIAL

## 2020-08-04 VITALS
HEART RATE: 98 BPM | TEMPERATURE: 97.8 F | BODY MASS INDEX: 33.82 KG/M2 | DIASTOLIC BLOOD PRESSURE: 80 MMHG | SYSTOLIC BLOOD PRESSURE: 120 MMHG | OXYGEN SATURATION: 74 % | WEIGHT: 203 LBS | HEIGHT: 65 IN

## 2020-08-04 DIAGNOSIS — M67.912 UNSPECIFIED DISORDER OF SYNOVIUM AND TENDON, LEFT SHOULDER: ICD-10-CM

## 2020-08-04 PROCEDURE — 99214 OFFICE O/P EST MOD 30 MIN: CPT

## 2020-08-04 RX ORDER — DICLOFENAC SODIUM 75 MG/1
75 TABLET, DELAYED RELEASE ORAL
Qty: 60 | Refills: 1 | Status: DISCONTINUED | COMMUNITY
Start: 2019-03-15 | End: 2020-08-04

## 2020-08-04 NOTE — ASSESSMENT
[FreeTextEntry1] : 55 year old woman with hx of RA since 2009, hx Vitamin D deficiency, diverticulitis, neuropathy, presents for follow up of her RA. She was previously followed by Dr. Pelletier until he left [place], and had been well maintained on MTX/Folic acid, and PLQ. I added Humira 8/2018 and she is tolerating it well. Overall her RA is stable.  \par \par Plan:\par \par Sinus infection\par -amoxicilin 875mg BID x 7 days\par \par \par RA/Sjogren's\par -Standby medrol dosepak\par -Left knee CS injection 80mg depomedrol\par -Cont MTX 7 tabs q week, folic acid. \par -Inc PLQ back to 400mg total daily for now. \par -Had eye exam 2/2019.  Needs a new eye exam\par -Continue Humira. Incr dosing frequency to q10 days.\par - Hep B/C/Quantiferon negative 11/16/19. \par -Declined Pneumonia vaccination. Declined flu shot. \par -9/2017 labs VECTRA was in mild range at 29, routine labs unremarkable, inflammatory markers normal. Repeat VECTRA 8/14/18 was 34.\par -Recommend oral hydration and use of rewetting eye drops for secondary sjogrens. Advised compliance with eye drops. She is now doing eye drops more regularly.  No sign dry mouth today. \par \par \par Right shoulder rotator cuff tendonitis/impingement- not an issue today\par -declining CS injection today. Doesn't feel it is bad enough at present. \par -PT for left shoulder.  \par \par neck pain/Low back pain- stable, improved with diclofenac Rx'd by her spine doctor. \par -Rxd tizanidine 2mg QHS PRN, though she never took it due to "medication averse" in general.\par -Had c-spine XR 2/22/18. Has C5-C6 retrolisthesis, no evidenc at that time for atlantoaxial instability. +Lordosis reversal related to muscle spasm and/or underlying degenerative change. \par -If persistent, may need re-imaging. \par \par Neuropathy- improved\par -continue gabapentin [id number] BID  \par \par Secondary FMS\par -cont duloxetine 20mg daily\par \par Left ankle pain-seems to be stable\par -Now off diclofenac due to renal insufficiency.  If renal function is normal on next labs, and it is needed, may re-introduce cautiously/sparingly.  Try using topical diclofenac for now.  \par -Change shoes. Feels flats worsen this. \par - MRI left ankle which shows multiple tendinopathy, possible deltoid sprain, and some subtalar effusion/retrocalcaneal bursitis.\par -She has done PT. \par -had seen Dr. Hayden, conservative management advised. \par -Inc in humira dosing freq may help this. f/u labs/VECTRA\par \par Right pes anserine bursitis- stable, currently not active\par -Injected right pes bursa with 80mg depomedrol 4/2018. She found relief with this. Does not want another one at present. \par \par Knee pain- crepitus appreciated, likely with OA\par -Knee XRs->has yet to do this\par -Injected left knee with 80mg depomedrol today\par -Consider HA injections\par \par Right GTB-improved\par -Injected 80mg depomedrol 4/2/19\par \par Right hip anterior pain-likely right hip flexor tendonitis--< not active today\par -PT script given previously\par \par \par \par Vit D deficiency-corrected\par -continue 5000IU daily. \par \par \par Gum irregularity-\par -To ask dentist why left inner gum is more pronounced. Denies hx of smoking ever. \par \par Plantar fasciitis- not active\par -Use plantar splints nightly\par \par f/u 3 months, labs today, and then q 3 months \par . \par \par

## 2020-08-04 NOTE — HISTORY OF PRESENT ILLNESS
[FreeTextEntry1] : 55 year old woman with hx including RA, vitamin d deficiency, diverticulitis, Right hip replacement 2018, neuropathy, presents for follow up. \par \par She is on MTX 7 tabs q week, tolerating well, no oral sores, hair loss. She is also taking PLQ 200mg, but self weaned to 200mg daily, last eye exam was 2/2019. Everything OK medication wise, but possible glaucoma in right eye developing. +Dry eye as well for which she was previously advised rewetting drops.\par \par She started Humira 8/31/18. She feels it helps. She is now taking it q10\par \par Thinks she has a sinus infection.  \par Had some pressure previously, but it improved. Still feels fluid, drip.  Still has a bitter taste/smell.  \par \par Overall feels better.   Has 3/10 pain with all day stiffness. Pain in l knees, right hand knuckles and right wrist >L, ankles L>R.  Ankles worsen in the summer.  She attributes to footwear in summer.  \par She has heel pain.  \par 2 hours of AM stiffness\par She has been putting diclofenac gel on left ankle, which helps.  \par \par Didn't take tizanidine, never started, didn't feel she wanted to take another med.\par Feels duloxetine was helpful, she wants to continue this. \par \par Feels gabapentin helped her zings/throbs, but now acting up again. She takes 600mgBID of gabapentin. \par \par

## 2020-08-11 ENCOUNTER — RX RENEWAL (OUTPATIENT)
Age: 55
End: 2020-08-11

## 2020-08-31 LAB
25(OH)D3 SERPL-MCNC: 58.4 NG/ML
ALBUMIN SERPL ELPH-MCNC: 4.7 G/DL
ALP BLD-CCNC: 50 U/L
ALT SERPL-CCNC: 23 U/L
ANION GAP SERPL CALC-SCNC: 15 MMOL/L
AST SERPL-CCNC: 27 U/L
BASOPHILS # BLD AUTO: 0.05 K/UL
BASOPHILS NFR BLD AUTO: 0.8 %
BILIRUB SERPL-MCNC: 0.3 MG/DL
BUN SERPL-MCNC: 34 MG/DL
CALCIUM SERPL-MCNC: 9.9 MG/DL
CHLORIDE SERPL-SCNC: 103 MMOL/L
CO2 SERPL-SCNC: 27 MMOL/L
CREAT SERPL-MCNC: 1.25 MG/DL
CRP SERPL-MCNC: 0.31 MG/DL
EOSINOPHIL # BLD AUTO: 0.23 K/UL
EOSINOPHIL NFR BLD AUTO: 3.6 %
ERYTHROCYTE [SEDIMENTATION RATE] IN BLOOD BY WESTERGREN METHOD: 49 MM/HR
GLUCOSE SERPL-MCNC: 78 MG/DL
HCT VFR BLD CALC: 40.8 %
HGB BLD-MCNC: 12.4 G/DL
IMM GRANULOCYTES NFR BLD AUTO: 0.2 %
LYMPHOCYTES # BLD AUTO: 2.25 K/UL
LYMPHOCYTES NFR BLD AUTO: 35.2 %
MAN DIFF?: NORMAL
MCHC RBC-ENTMCNC: 29 PG
MCHC RBC-ENTMCNC: 30.4 GM/DL
MCV RBC AUTO: 95.6 FL
MONOCYTES # BLD AUTO: 0.54 K/UL
MONOCYTES NFR BLD AUTO: 8.5 %
NEUTROPHILS # BLD AUTO: 3.31 K/UL
NEUTROPHILS NFR BLD AUTO: 51.7 %
PLATELET # BLD AUTO: 269 K/UL
POTASSIUM SERPL-SCNC: 4 MMOL/L
PROT SERPL-MCNC: 7 G/DL
RBC # BLD: 4.27 M/UL
RBC # FLD: 15.3 %
SODIUM SERPL-SCNC: 145 MMOL/L
WBC # FLD AUTO: 6.39 K/UL

## 2020-09-08 ENCOUNTER — RX RENEWAL (OUTPATIENT)
Age: 55
End: 2020-09-08

## 2020-09-16 ENCOUNTER — RX RENEWAL (OUTPATIENT)
Age: 55
End: 2020-09-16

## 2020-10-13 ENCOUNTER — RX RENEWAL (OUTPATIENT)
Age: 55
End: 2020-10-13

## 2020-11-05 ENCOUNTER — APPOINTMENT (OUTPATIENT)
Dept: RHEUMATOLOGY | Facility: CLINIC | Age: 55
End: 2020-11-05
Payer: COMMERCIAL

## 2020-11-05 VITALS
WEIGHT: 200 LBS | DIASTOLIC BLOOD PRESSURE: 62 MMHG | SYSTOLIC BLOOD PRESSURE: 100 MMHG | OXYGEN SATURATION: 98 % | BODY MASS INDEX: 33.32 KG/M2 | HEART RATE: 62 BPM | TEMPERATURE: 97.3 F | HEIGHT: 65 IN

## 2020-11-05 LAB
ALBUMIN SERPL ELPH-MCNC: 4.6 G/DL
ALP BLD-CCNC: 52 U/L
ALT SERPL-CCNC: 17 U/L
ANION GAP SERPL CALC-SCNC: 10 MMOL/L
AST SERPL-CCNC: 21 U/L
BASOPHILS # BLD AUTO: 0.05 K/UL
BASOPHILS NFR BLD AUTO: 0.8 %
BILIRUB SERPL-MCNC: 0.3 MG/DL
BUN SERPL-MCNC: 26 MG/DL
CALCIUM SERPL-MCNC: 10 MG/DL
CHLORIDE SERPL-SCNC: 99 MMOL/L
CO2 SERPL-SCNC: 29 MMOL/L
CREAT SERPL-MCNC: 1.2 MG/DL
CRP SERPL-MCNC: 0.17 MG/DL
EOSINOPHIL # BLD AUTO: 0.24 K/UL
EOSINOPHIL NFR BLD AUTO: 3.7 %
GLUCOSE SERPL-MCNC: 109 MG/DL
HCT VFR BLD CALC: 41.4 %
HGB BLD-MCNC: 13 G/DL
IMM GRANULOCYTES NFR BLD AUTO: 0.3 %
LYMPHOCYTES # BLD AUTO: 2.69 K/UL
LYMPHOCYTES NFR BLD AUTO: 41.1 %
MAN DIFF?: NORMAL
MCHC RBC-ENTMCNC: 29.3 PG
MCHC RBC-ENTMCNC: 31.4 GM/DL
MCV RBC AUTO: 93.2 FL
MONOCYTES # BLD AUTO: 0.42 K/UL
MONOCYTES NFR BLD AUTO: 6.4 %
NEUTROPHILS # BLD AUTO: 3.12 K/UL
NEUTROPHILS NFR BLD AUTO: 47.7 %
PLATELET # BLD AUTO: 258 K/UL
POTASSIUM SERPL-SCNC: 4.2 MMOL/L
PROT SERPL-MCNC: 7 G/DL
RBC # BLD: 4.44 M/UL
RBC # FLD: 15.9 %
SODIUM SERPL-SCNC: 138 MMOL/L
WBC # FLD AUTO: 6.54 K/UL

## 2020-11-05 PROCEDURE — 96372 THER/PROPH/DIAG INJ SC/IM: CPT

## 2020-11-05 PROCEDURE — 99072 ADDL SUPL MATRL&STAF TM PHE: CPT

## 2020-11-05 PROCEDURE — 99214 OFFICE O/P EST MOD 30 MIN: CPT | Mod: 25

## 2020-11-05 RX ORDER — AMOXICILLIN 875 MG/1
875 TABLET, FILM COATED ORAL
Qty: 14 | Refills: 0 | Status: DISCONTINUED | COMMUNITY
Start: 2020-08-04 | End: 2020-11-05

## 2020-11-05 RX ORDER — METHYLPRED ACET/NACL,ISO-OS/PF 80 MG/ML
80 VIAL (ML) INJECTION
Qty: 1 | Refills: 0 | Status: COMPLETED | OUTPATIENT
Start: 2020-11-05

## 2020-11-05 RX ADMIN — METHYLPREDNISOLONE ACETATE 0 MG/ML: 80 INJECTION, SUSPENSION INTRA-ARTICULAR; INTRALESIONAL; INTRAMUSCULAR; SOFT TISSUE at 00:00

## 2020-11-05 NOTE — PROCEDURE
[FreeTextEntry1] : Skin was prepped with alcohol, and 80mg of depomedrol was injected intramuscularly.  Patient tolerated injection well.\par

## 2020-11-05 NOTE — ASSESSMENT
[FreeTextEntry1] : 55 year old woman with hx of RA since 2009, hx Vitamin D deficiency, diverticulitis, neuropathy, presents for follow up of her RA. She was previously followed by Dr. Pelletier until he left [place], and had been well maintained on MTX/Folic acid, and PLQ. I added Humira 8/2018 and she is tolerating it well. Overall her RA is stable. \par \par Plan:\par \par RA/Sjogren's\par -Standby medrol dosepak\par -Left knee CS injection 80mg depomedrol was VERY helpful last time.  She does not need another one now.  \par -Cont MTX 7 tabs q week, folic acid.  Consider switching to injectable.  \par -ICont PlQ 400mg total daily for now. \par -Had eye exam 2/2019. Needs a new eye exam\par -Continue Humira. Cont dosing frequency to q10 days.\par - Hep B/C/Quantiferon negative 11/16/19. Recheck now.\par -Declined Pneumonia vaccination. Declined flu shot. \par -9/2017 labs VECTRA was in mild range at 29, routine labs unremarkable, inflammatory markers normal. Repeat VECTRA 8/14/18 was 34.\par -Recommend oral hydration and use of rewetting eye drops for secondary sjogrens. Advised compliance with eye drops. She is now doing eye drops more regularly. She takes theratears.  No sign dry mouth today.\par -Check ESR todaty\par \par Pes planus\par -ankle pain is likely related to pes planus\par -podiatry referral\par \par Right shoulder rotator cuff tendonitis/impingement- not an issue today\par -declining CS injection today. Doesn't feel it is bad enough at present. \par -PT for left shoulder. \par \par neck pain/Low back pain- stable, improved with diclofenac Rx'd by her spine doctor. \par -Rxd tizanidine 2mg QHS PRN, though she never took it due to "medication averse" in general.\par -Had c-spine XR 2/22/18. Has C5-C6 retrolisthesis, no evidenc at that time for atlantoaxial instability. +Lordosis reversal related to muscle spasm and/or underlying degenerative change. \par -If persistent, may need re-imaging. \par \par Neuropathy- improved, but fatigue may be related.  \par -decr gabapentin to 300mg/600mg x 7 days, then 0mg/600mg thereafter.  \par \par Secondary FMS\par -cont duloxetine 20mg daily\par \par Left ankle pain-seems to be stable\par -Advised custom orthotics\par -Now off diclofenac due to renal insufficiency. If renal function is normal on next labs, and it is needed, may re-introduce cautiously/sparingly. Try using topical diclofenac for now. \par -Change shoes. Feels flats worsen this. \par - MRI left ankle which shows multiple tendinopathy, possible deltoid sprain, and some subtalar effusion/retrocalcaneal bursitis.\par -She has done PT. \par -had seen Dr. Hayden, conservative management advised. \par -Inc in humira dosing freq may help this. f/u labs/VECTRA\par \par Right pes anserine bursitis- stable, currently not active\par -Injected right pes bursa with 80mg depomedrol 4/2018. She found relief with this. Does not want another one at present. \par \par Knee pain- crepitus appreciated, likely with OA\par -Knee XRs->has yet to do this\par -Injected left knee with 80mg depomedrol today\par -Consider HA injections\par \par Right GTB-improved\par -Injected 80mg depomedrol 4/2/19\par \par Right hip anterior pain-likely right hip flexor tendonitis--< not active today\par -PT script given previously\par \par \par Vit D deficiency-corrected\par -continue 5000IU daily. \par \par \par Gum irregularity-\par -To ask dentist why left inner gum is more pronounced. Denies hx of smoking ever. \par \par Plantar fasciitis- not active\par -Use plantar splints nightly\par \par f/u 3 months, labs today, and then q 3 months \par . \par \par  \par \par

## 2020-11-05 NOTE — HISTORY OF PRESENT ILLNESS
[FreeTextEntry1] : 55 year old woman with hx including RA, vitamin d deficiency, diverticulitis, Right hip replacement 2018, neuropathy, presents for follow up. \par \par She is on MTX 7 tabs q week, tolerating well, no oral sores, hair loss. She is also taking PLQ 200mg, but self weaned to 200mg daily, last eye exam was 2/2019. Everything OK medication wise, but possible glaucoma in right eye developing. +Dry eye as well for which she was previously advised rewetting drops.\par \par She started Humira 8/31/18. She feels it helps. She is now taking it q10 days\par \par Didn't take tizanidine, never started, didn't feel she wanted to take another med.\par Feels duloxetine was helpful, she wants to continue this. \par \par Feels gabapentin helped her zings/throbs, but now acting up again. She takes 600mgBID of gabapentin. \par \par

## 2020-11-06 LAB
25(OH)D3 SERPL-MCNC: 62.9 NG/ML
ERYTHROCYTE [SEDIMENTATION RATE] IN BLOOD BY WESTERGREN METHOD: 25 MM/HR
HBV CORE IGG+IGM SER QL: NONREACTIVE
HBV SURFACE AB SERPL IA-ACNC: <3 MIU/ML
HBV SURFACE AG SER QL: NONREACTIVE
HCV AB SER QL: NONREACTIVE
HCV S/CO RATIO: 0.08 S/CO

## 2020-11-10 ENCOUNTER — RX RENEWAL (OUTPATIENT)
Age: 55
End: 2020-11-10

## 2020-11-10 LAB
M TB IFN-G BLD-IMP: NEGATIVE
QUANTIFERON TB PLUS MITOGEN MINUS NIL: 8.19 IU/ML
QUANTIFERON TB PLUS NIL: 0.03 IU/ML
QUANTIFERON TB PLUS TB1 MINUS NIL: 0 IU/ML
QUANTIFERON TB PLUS TB2 MINUS NIL: 0.02 IU/ML

## 2020-12-01 ENCOUNTER — RX RENEWAL (OUTPATIENT)
Age: 55
End: 2020-12-01

## 2020-12-15 ENCOUNTER — RX RENEWAL (OUTPATIENT)
Age: 55
End: 2020-12-15

## 2020-12-16 PROBLEM — Z87.440 HISTORY OF URINARY TRACT INFECTION: Status: RESOLVED | Noted: 2018-03-01 | Resolved: 2020-12-16

## 2020-12-17 ENCOUNTER — RX RENEWAL (OUTPATIENT)
Age: 55
End: 2020-12-17

## 2020-12-18 ENCOUNTER — RX RENEWAL (OUTPATIENT)
Age: 55
End: 2020-12-18

## 2021-01-12 ENCOUNTER — RX RENEWAL (OUTPATIENT)
Age: 56
End: 2021-01-12

## 2021-02-05 ENCOUNTER — RX RENEWAL (OUTPATIENT)
Age: 56
End: 2021-02-05

## 2021-02-11 ENCOUNTER — APPOINTMENT (OUTPATIENT)
Dept: RHEUMATOLOGY | Facility: CLINIC | Age: 56
End: 2021-02-11
Payer: COMMERCIAL

## 2021-02-11 VITALS
HEART RATE: 74 BPM | TEMPERATURE: 97.9 F | OXYGEN SATURATION: 98 % | DIASTOLIC BLOOD PRESSURE: 79 MMHG | WEIGHT: 205 LBS | BODY MASS INDEX: 34.11 KG/M2 | SYSTOLIC BLOOD PRESSURE: 122 MMHG

## 2021-02-11 PROCEDURE — 99072 ADDL SUPL MATRL&STAF TM PHE: CPT

## 2021-02-11 PROCEDURE — 99214 OFFICE O/P EST MOD 30 MIN: CPT

## 2021-02-12 LAB
25(OH)D3 SERPL-MCNC: 67.2 NG/ML
ALBUMIN SERPL ELPH-MCNC: 4.6 G/DL
ALP BLD-CCNC: 59 U/L
ALT SERPL-CCNC: 19 U/L
ANION GAP SERPL CALC-SCNC: 11 MMOL/L
AST SERPL-CCNC: 22 U/L
BASOPHILS # BLD AUTO: 0.07 K/UL
BASOPHILS NFR BLD AUTO: 1 %
BILIRUB SERPL-MCNC: 0.3 MG/DL
BUN SERPL-MCNC: 28 MG/DL
CALCIUM SERPL-MCNC: 9.8 MG/DL
CHLORIDE SERPL-SCNC: 101 MMOL/L
CO2 SERPL-SCNC: 29 MMOL/L
CREAT SERPL-MCNC: 1.28 MG/DL
CRP SERPL-MCNC: 0.11 MG/DL
EOSINOPHIL # BLD AUTO: 0.22 K/UL
EOSINOPHIL NFR BLD AUTO: 3.2 %
ERYTHROCYTE [SEDIMENTATION RATE] IN BLOOD BY WESTERGREN METHOD: 25 MM/HR
GLUCOSE SERPL-MCNC: 75 MG/DL
HCT VFR BLD CALC: 40.9 %
HGB BLD-MCNC: 13.1 G/DL
IMM GRANULOCYTES NFR BLD AUTO: 0.1 %
LYMPHOCYTES # BLD AUTO: 2.59 K/UL
LYMPHOCYTES NFR BLD AUTO: 37.4 %
MAN DIFF?: NORMAL
MCHC RBC-ENTMCNC: 29.2 PG
MCHC RBC-ENTMCNC: 32 GM/DL
MCV RBC AUTO: 91.3 FL
MONOCYTES # BLD AUTO: 0.5 K/UL
MONOCYTES NFR BLD AUTO: 7.2 %
NEUTROPHILS # BLD AUTO: 3.54 K/UL
NEUTROPHILS NFR BLD AUTO: 51.1 %
PLATELET # BLD AUTO: 234 K/UL
POTASSIUM SERPL-SCNC: 3.9 MMOL/L
PROT SERPL-MCNC: 7.2 G/DL
RBC # BLD: 4.48 M/UL
RBC # FLD: 15.8 %
SARS-COV-2 IGG SERPL IA-ACNC: 0.07 INDEX
SARS-COV-2 IGG SERPL QL IA: NEGATIVE
SODIUM SERPL-SCNC: 141 MMOL/L
WBC # FLD AUTO: 6.93 K/UL

## 2021-02-12 NOTE — ASSESSMENT
[FreeTextEntry1] : f/u 3 months\par covid anibodies in\par stop plq\par continue others\par if needed, we will incr humira to q7 days or inc mtx to 8 q week\par \par \par 55 year old woman with hx of RA since 2009, hx Vitamin D deficiency, diverticulitis, neuropathy, presents for follow up of her RA. She was previously followed by Dr. Pelletier until he left [place], and had been well maintained on MTX/Folic acid, and PLQ. I added Humira 8/2018 and she is tolerating it well. Overall her RA is stable. \par \par Plan:\par \par RA/Sjogren's\par -Standby medrol dosepak\par -Left knee CS injection 80mg depomedrol was VERY helpful previously. She does not need another one now. \par -Cont MTX 7 tabs q week, folic acid. Consider switching to injectable. \par -Stop plaquenil due to questionnable retinal changes.  If needed for arthritis, can uptitrate Humira or MTX.  \par -Continue Humira. Cont dosing frequency to q10 days.\par - Hep B/C/Quantiferon negative 11/16/19. Recheck now.\par -Declined Pneumonia vaccination. Declined flu shot. \par -9/2017 labs VECTRA was in mild range at 29, routine labs unremarkable, inflammatory markers normal. Repeat VECTRA 8/14/18 was 34.\par -Recommend oral hydration and use of rewetting eye drops for secondary sjogrens. Advised compliance with eye drops. She is now doing eye drops more regularly. She takes theratears. No sign dry mouth today.\par -Check ESR todaty\par \par Pes planus-\par -ankle pain is not active today. \par -podiatry referral\par \par Right shoulder rotator cuff tendonitis/impingement- not an issue today\par -declining CS injection today. Doesn't feel it is bad enough at present. \par -PT for left shoulder. \par \par neck pain/Low back pain- stable, improved with diclofenac Rx'd by her spine doctor. \par -Rxd tizanidine 2mg QHS PRN, though she never took it due to "medication averse" in general.\par -Had c-spine XR 2/22/18. Has C5-C6 retrolisthesis, no evidenc at that time for atlantoaxial instability. +Lordosis reversal related to muscle spasm and/or underlying degenerative change. \par -If persistent, may need re-imaging. \par \par Neuropathy- improved, but fatigue may be related. \par -cont  0mg/600mg thereafter.   The fatigue did improve with removing gabapentin in AM.  \par \par Secondary FMS\par -cont duloxetine 20mg daily\par \par Left ankle pain-not active\par -Advised custom orthotics\par -Now off diclofenac due to renal insufficiency. If renal function is normal on next labs, and it is needed, may re-introduce cautiously/sparingly. Try using topical diclofenac for now. \par -Change shoes. Feels flats worsen this. \par - MRI left ankle which shows multiple tendinopathy, possible deltoid sprain, and some subtalar effusion/retrocalcaneal bursitis.\par -She has done PT. \par -had seen Dr. Hayden, conservative management advised. \par -Inc in humira dosing freq may help this. f/u labs/VECTRA\par \par Right pes anserine bursitis- stable, currently not active\par -Injected right pes bursa with 80mg depomedrol 4/2018. She found relief with this. Does not want another one at present. \par \par Knee pain- crepitus appreciated, likely with OA\par -Knee XRs->has yet to do this\par -Injected left knee with 80mg depomedrol 5/28/20\par -Consider HA injections\par \par Right GTB-improved\par -Injected 80mg depomedrol 4/2/19\par \par Right hip anterior pain-likely right hip flexor tendonitis--< not active today\par -PT script given previously\par \par \par Vit D deficiency-corrected\par -continue 5000IU daily. \par \par \par Gum irregularity-\par -To ask dentist why left inner gum is more pronounced. Denies hx of smoking ever. \par \par \par f/u 3 months, labs today, and then q 3 months \par \par . \par \par  \par

## 2021-02-12 NOTE — HISTORY OF PRESENT ILLNESS
[FreeTextEntry1] : 55 year old woman with hx including RA, vitamin d deficiency, diverticulitis, Right hip replacement 2018, neuropathy, presents for follow up. \par \par She had a +Covid test, but was largely asymptomatic.  Now resolved.  \par \par She has 2/10 pain with 3 hours of AM stiffness.\par \par She is on MTX 7 tabs q week, tolerating well, no oral sores, hair loss. She is also taking PLQ 200mg, but self weaned to 200mg daily, last eye exam was 2/2019. Everything OK medication wise, but possible glaucoma in right eye developing. +Dry eye as well for which she was previously advised rewetting drops.\par \par She started Humira 8/31/18. She feels it helps. She is now taking it q10 days\par \par She saw a retina specialist, Dr. Dumont who prefers she holds off on PLQ given some retina changes.  \par \par Didn't take tizanidine, never started, didn't feel she wanted to take another med.\par Feels duloxetine was helpful, she wants to continue this. \par \par Feels gabapentin helped her zings/throbs, but now acting up again. She takes 600mgBID of gabapentin. \par \par

## 2021-03-11 ENCOUNTER — RX RENEWAL (OUTPATIENT)
Age: 56
End: 2021-03-11

## 2021-05-11 ENCOUNTER — RX RENEWAL (OUTPATIENT)
Age: 56
End: 2021-05-11

## 2021-05-13 ENCOUNTER — NON-APPOINTMENT (OUTPATIENT)
Age: 56
End: 2021-05-13

## 2021-05-13 ENCOUNTER — APPOINTMENT (OUTPATIENT)
Dept: RHEUMATOLOGY | Facility: CLINIC | Age: 56
End: 2021-05-13
Payer: COMMERCIAL

## 2021-05-13 VITALS
SYSTOLIC BLOOD PRESSURE: 129 MMHG | DIASTOLIC BLOOD PRESSURE: 81 MMHG | TEMPERATURE: 96.7 F | WEIGHT: 199 LBS | HEART RATE: 82 BPM | OXYGEN SATURATION: 98 % | HEIGHT: 65 IN | BODY MASS INDEX: 33.15 KG/M2

## 2021-05-13 DIAGNOSIS — M47.816 SPONDYLOSIS W/OUT MYELOPATHY OR RADICULOPATHY, LUMBAR REGION: ICD-10-CM

## 2021-05-13 PROCEDURE — 99072 ADDL SUPL MATRL&STAF TM PHE: CPT

## 2021-05-13 PROCEDURE — 99214 OFFICE O/P EST MOD 30 MIN: CPT

## 2021-05-13 RX ORDER — HYDROXYCHLOROQUINE SULFATE 200 MG/1
200 TABLET, FILM COATED ORAL
Qty: 180 | Refills: 0 | Status: DISCONTINUED | COMMUNITY
Start: 2017-02-03 | End: 2021-05-13

## 2021-05-13 NOTE — ASSESSMENT
[FreeTextEntry1] : 56 year old woman with hx of RA since 2009, hx Vitamin D deficiency, diverticulitis, neuropathy, presents for follow up of her RA. She was previously followed by Dr. Pelletier until he left [place], and had been well maintained on MTX/Folic acid, and PLQ. I added Humira 8/2018 and she is tolerating it well. Overall her RA is stable. \par \par Plan:\par \par RA/Sjogren's\par -Standby medrol dosepak\par -Left knee CS injection 80mg depomedrol was VERY helpful previously. She does not need another one now. \par -Cont MTX 7 tabs q week, folic acid. Consider switching to injectable. \par -Stop plaquenil due to questionable retinal changes. If needed for arthritis, can uptitrate Humira or MTX. \par -Continue Humira. Cont dosing frequency to q10 days.\par - Hep B/C/Quantiferon negative 11/16/20.\par -Declined Pneumonia vaccination. Declined flu shot. \par - VECTRA 2019 was in mild range at 29, routine labs unremarkable, inflammatory markers normal. Repeat VECTRA 8/14/18 was 34.\par -Recommend oral hydration and use of rewetting eye drops for secondary sjogrens. Advised compliance with eye drops. She is now doing eye drops more regularly. She takes theratears. No sign dry mouth today.\par -routine labs today\par \par Pes planus-\par -ankle pain is not active today. \par -podiatry referral\par \par Right shoulder rotator cuff tendonitis/impingement- not an issue today\par -declining CS injection today. Doesn't feel it is bad enough at present. \par -PT for left shoulder. \par \par neck pain/Low back pain- stable, improved with diclofenac Rx'd by her spine doctor. \par -Rxd tizanidine 2mg QHS PRN, though she never took it due to "medication averse" in general.\par -Had c-spine XR 2/22/18. Has C5-C6 retrolisthesis, no evidenc at that time for atlantoaxial instability. +Lordosis reversal related to muscle spasm and/or underlying degenerative change. \par -If persistent, may need re-imaging. \par \par Neuropathy- improved, but fatigue may be related. \par -cont 0mg/600mg thereafter. The fatigue did improve with removing gabapentin in AM. \par \par Secondary FMS\par -cont duloxetine 20mg daily\par \par Left ankle pain-not active\par -Advised custom orthotics\par -Now off diclofenac due to renal insufficiency. If renal function is normal on next labs, and it is needed, may re-introduce cautiously/sparingly. Try using topical diclofenac for now. \par -Change shoes. Feels flats worsen this. \par - MRI left ankle which shows multiple tendinopathy, possible deltoid sprain, and some subtalar effusion/retrocalcaneal bursitis.\par -She has done PT. \par -had seen Dr. Hayden, conservative management advised. \par -Inc in humira dosing freq may help this. f/u labs/VECTRA\par \par Right pes anserine bursitis- stable, currently not active\par -Injected right pes bursa with 80mg depomedrol 4/2018. She found relief with this. Does not want another one at present. \par \par Knee pain- crepitus appreciated, likely with OA\par -Knee XRs->has yet to do this\par -Injected left knee with 80mg depomedrol 5/28/20\par -Consider HA injections\par \par Right GTB-improved\par -Injected 80mg depomedrol 4/2/19\par \par Right hip anterior pain-likely right hip flexor tendonitis--< not active today\par -PT script given previously\par \par \par Vit D deficiency-corrected\par -continue 5000IU daily. \par \par \par Gum irregularity-\par -To ask dentist why left inner gum is more pronounced. Denies hx of smoking ever. \par \par \par f/u 3 months, labs today, and then q 3 months \par \par . \par

## 2021-05-13 NOTE — HISTORY OF PRESENT ILLNESS
[FreeTextEntry1] : 55 year old woman with hx including RA, vitamin d deficiency, diverticulitis, Right hip replacement 2018, neuropathy, presents for follow up. \par \par Meds from me\par Humira q 10 days\par MTX 7 tabs\par Folic acid\par Gabapentin 300 qAM and 600qPM\par \par She has 4-5/10 pain with 3 hours of AM stiffness.\par She was recently not well. Was told she likely had a virus pass through her.\par She recently had drenching sweats, and then her hands clawed with spasm.\par 5/2/21\par 5/9/21 she was feeling a little better\par She is feeling back to her abseline now.  Her joints are generally doing well.  \par \par She is on MTX 7 tabs q week, tolerating well, no oral sores, hair loss.\par She started Humira 8/31/18. She feels it helps. She is now taking it q10 days\par She saw a retina specialist, Dr. Dumont who prefers she holds off on PLQ given some retina changes. \par \par Didn't take tizanidine, never started, didn't feel she wanted to take another med.\par Feels duloxetine was helpful, she wants to continue this. \par \par Feels gabapentin helped her zings/throbs.  She went off of it while she was on vacation, but realized her zings started to come back, so she is back on gabapentin.  \par \par \par

## 2021-06-17 NOTE — BRIEF OPERATIVE NOTE - DISPOSITION
PACU to floor
pacu then floor
Patient presents to ED with c/o of fatigue with upper back pains, diarrhea 4 episodes yesterday and 2 today, and cough for 2 days. Patient was seen in an ED in [place] 5 days ago for fatigue and dizziness and was found to be hypertensive and was given Amlodipine which he started yesterday. Yesterday he had a Covid swab done at a swabbing center and results were neg for rapid and pcr. Patient states the cough causing him burning in his upper back. States in the morning the cough is productive for yellow phlegm otherwise the cough is dry. Patient is current smoker 0.5 PPD. Patient took a few doses of amoxicillin her had from and old prescription and then states he changed to Zithromax also from an old prescription yesterday. Patient noted to have temp 100.7 rectally in ED. Denies any shortness of breath, chest pain, nausea or vomiting. Noted to have tenness with palpation to left lower abdomen, no abdominal pain at rest.

## 2021-07-06 ENCOUNTER — NON-APPOINTMENT (OUTPATIENT)
Age: 56
End: 2021-07-06

## 2021-07-06 LAB
25(OH)D3 SERPL-MCNC: 72.2 NG/ML
ALBUMIN SERPL ELPH-MCNC: 4.7 G/DL
ALP BLD-CCNC: 63 U/L
ALT SERPL-CCNC: 18 U/L
ANION GAP SERPL CALC-SCNC: 14 MMOL/L
AST SERPL-CCNC: 21 U/L
BASOPHILS # BLD AUTO: 0.07 K/UL
BASOPHILS NFR BLD AUTO: 1.1 %
BILIRUB SERPL-MCNC: 0.3 MG/DL
BUN SERPL-MCNC: 30 MG/DL
CALCIUM SERPL-MCNC: 10.2 MG/DL
CHLORIDE SERPL-SCNC: 105 MMOL/L
CO2 SERPL-SCNC: 26 MMOL/L
CREAT SERPL-MCNC: 1.25 MG/DL
CRP SERPL-MCNC: <3 MG/L
EOSINOPHIL # BLD AUTO: 0.26 K/UL
EOSINOPHIL NFR BLD AUTO: 4.1 %
ERYTHROCYTE [SEDIMENTATION RATE] IN BLOOD BY WESTERGREN METHOD: 27 MM/HR
GLUCOSE SERPL-MCNC: 84 MG/DL
HCT VFR BLD CALC: 40.5 %
HGB BLD-MCNC: 13 G/DL
IMM GRANULOCYTES NFR BLD AUTO: 0.2 %
LYMPHOCYTES # BLD AUTO: 2.21 K/UL
LYMPHOCYTES NFR BLD AUTO: 34.5 %
MAN DIFF?: NORMAL
MCHC RBC-ENTMCNC: 29.5 PG
MCHC RBC-ENTMCNC: 32.1 GM/DL
MCV RBC AUTO: 92 FL
MONOCYTES # BLD AUTO: 0.57 K/UL
MONOCYTES NFR BLD AUTO: 8.9 %
NEUTROPHILS # BLD AUTO: 3.28 K/UL
NEUTROPHILS NFR BLD AUTO: 51.2 %
PLATELET # BLD AUTO: 253 K/UL
POTASSIUM SERPL-SCNC: 4.6 MMOL/L
PROT SERPL-MCNC: 7.5 G/DL
RBC # BLD: 4.4 M/UL
RBC # FLD: 16.9 %
SODIUM SERPL-SCNC: 145 MMOL/L
WBC # FLD AUTO: 6.4 K/UL

## 2021-07-20 ENCOUNTER — LABORATORY RESULT (OUTPATIENT)
Age: 56
End: 2021-07-20

## 2021-08-05 ENCOUNTER — APPOINTMENT (OUTPATIENT)
Dept: RHEUMATOLOGY | Facility: CLINIC | Age: 56
End: 2021-08-05
Payer: COMMERCIAL

## 2021-08-05 VITALS
WEIGHT: 205 LBS | TEMPERATURE: 96.7 F | OXYGEN SATURATION: 97 % | BODY MASS INDEX: 34.16 KG/M2 | DIASTOLIC BLOOD PRESSURE: 80 MMHG | HEIGHT: 65 IN | HEART RATE: 71 BPM | SYSTOLIC BLOOD PRESSURE: 124 MMHG

## 2021-08-05 PROCEDURE — 20610 DRAIN/INJ JOINT/BURSA W/O US: CPT | Mod: RT

## 2021-08-05 PROCEDURE — 99214 OFFICE O/P EST MOD 30 MIN: CPT | Mod: 25

## 2021-08-05 RX ORDER — METHYLPRED ACET/NACL,ISO-OS/PF 80 MG/ML
80 VIAL (ML) INJECTION
Qty: 1 | Refills: 0 | Status: COMPLETED | OUTPATIENT
Start: 2021-08-05

## 2021-08-05 RX ADMIN — METHYLPREDNISOLONE ACETATE 0 MG/ML: 80 INJECTION, SUSPENSION INTRA-ARTICULAR; INTRALESIONAL; INTRAMUSCULAR; SOFT TISSUE at 00:00

## 2021-08-10 ENCOUNTER — APPOINTMENT (OUTPATIENT)
Dept: RHEUMATOLOGY | Facility: CLINIC | Age: 56
End: 2021-08-10

## 2021-08-10 ENCOUNTER — RX RENEWAL (OUTPATIENT)
Age: 56
End: 2021-08-10

## 2021-09-01 ENCOUNTER — RX RENEWAL (OUTPATIENT)
Age: 56
End: 2021-09-01

## 2021-09-10 ENCOUNTER — APPOINTMENT (OUTPATIENT)
Dept: ORTHOPEDIC SURGERY | Facility: CLINIC | Age: 56
End: 2021-09-10
Payer: COMMERCIAL

## 2021-09-10 ENCOUNTER — TRANSCRIPTION ENCOUNTER (OUTPATIENT)
Age: 56
End: 2021-09-10

## 2021-09-10 VITALS — BODY MASS INDEX: 34.16 KG/M2 | WEIGHT: 205 LBS | HEIGHT: 65 IN

## 2021-09-10 PROCEDURE — 73560 X-RAY EXAM OF KNEE 1 OR 2: CPT | Mod: RT

## 2021-09-10 PROCEDURE — 20610 DRAIN/INJ JOINT/BURSA W/O US: CPT | Mod: RT

## 2021-09-10 PROCEDURE — 73502 X-RAY EXAM HIP UNI 2-3 VIEWS: CPT | Mod: RT

## 2021-09-10 PROCEDURE — 99214 OFFICE O/P EST MOD 30 MIN: CPT | Mod: 25

## 2021-09-10 NOTE — HISTORY OF PRESENT ILLNESS
[de-identified] : Ms. BECCA RANGEL is a 56 year female who presents to office complaining of right knee pain.\par Pain is a dull/achy pain in the posterior aspect of the knee which has been worsening over the last few months.\par Denies specific injury, trauma, or fall to her knees.\par Pain is aggravated with activities such as prolonged weightbearing and ambulating.\par The knee also feels "stiff" as per patient.\par She says she received a cortisone injection about 1 month ago by her rheumatologist, who she sees for rheumatoid arthritis.\par The injection only helped her knee pain for about 2 weeks.\par She is a former patient of Dr. Juliano Jones with h/o right THR in 2018 which is doing well at this time.\par All review of systems, family history, social history, surgical history, past medical history, medications, and allergies not previously stated as positive are negative. They were reviewed by me today with the patient and documented accordingly. [2] : a minimum pain level of 2/10 [8] : a maximum pain level of 8/10

## 2021-09-10 NOTE — DISCUSSION/SUMMARY
[de-identified] : That is post right hip replacement patient is doing well.  Advanced right knee arthritis we will try conservative treatment NSAIDs therapy strengthening she is considering right knee injection\par \par The risks and benefits of injection was discussed with the patient. Verbal consent was obtained from the patient. The area was prepped with Betadine. A lateral suprapatellar approach was used. The knee was injected with 2 cc of Depo-Medrol 2 cc of lidocaine.  The procedure well. Band-Aid was applied.

## 2021-09-10 NOTE — PHYSICAL EXAM
[Antalgic] : antalgic [Normal RUE] : Right Upper Extremity: No scars, rashes, lesions, ulcers, skin intact [Normal Finger/nose] : finger to nose coordination [Normal Touch] : sensation intact for touch [Poor Appearance] : well-appearing [Acute Distress] : not in acute distress [Obese] : obese [Normal] : no peripheral adenopathy appreciated [de-identified] : Patient appears stated age in no acute respiratory distress. Patient is alert oriented x3. Patient has normal mood and affect. \par Left knee exam\par Range of motion of the knee is 0-120°. \par Skin is normal.  No rash.\par There is no effusion. No medial or lateral joint line tenderness. No swelling, no pitting edema.\par Overall alignment of the knee is then slight varus. Good anterior posterior stability. Firm endpoints on anterior and posterior drawer. \par Medial lateral stability is intact. Firm endpoint on medial and lateral stress testing .\par Javier test is negative.\par Quadriceps strength 5/ 5. There is no loss of muscle volume in the thigh. \par Good anterior posterior and mediolateral stability.\par Sensation in the extremities intact. \par Discrimination is intact. Good DP and PT pulses.\par 		\par \par Bilateral hip exam\par On inspection of the hip shows skin is normal. No evidence of rash. \par No loss of muscle.  Abductor strength is 5 out of 5. Hip flexor strength is 5.\par Range of motion of the hip at 90° flexion internal rotation is 15° external rotation is 30° pain-free. \par Hip has good stability in anterior and posterior direction. \par On lateral decubitus  examination there is no tenderness in the greater trochanter. \par Lower Extremity Examination \par Bilateral lower extremity skin is normal. There is no rash. There is no edema and lymphadenopathy.  DP and PT pulses intact. Sensation is intact.\par \par Right knee exam\par There is minimal to moderate effusion. Range of motion is 0-120°. Painful at the extremes of range of motion. \par There is medial and lateral joint line tenderness. \par Quadriceps strength is 4/5. There is some muscle loss in the quadriceps. \par Anteroposterior and mediolateral stability is intact Javier test is negative. Alignment of the knee is in 5° of varus. [de-identified] : X-rays of the right knee shows arthritis moderate arthritis.  3 views x-rays of the right hip 2 view shows hip replacement in good alignment no evidence of component loosening

## 2021-09-24 NOTE — H&P PST ADULT - MUSCULOSKELETAL
84.8 details… detailed exam no calf tenderness/normal strength/decreased ROM due to pain/no joint swelling/no joint erythema/Right hip/no joint warmth

## 2021-10-01 ENCOUNTER — NON-APPOINTMENT (OUTPATIENT)
Age: 56
End: 2021-10-01

## 2021-10-06 NOTE — ASSESSMENT
[FreeTextEntry1] : 56 year old woman with hx of RA since 2009, hx Vitamin D deficiency, diverticulitis, neuropathy, presents for follow up of her RA. She was previously followed by Dr. Pelletier until he left [place], and had been well maintained on MTX/Folic acid, and PLQ. I added Humira 8/2018 and she is tolerating it well. Overall her RA is stable. \par \par Plan:\par \par RA/Sjogren's\par -Standby medrol dosepak\par -Left knee CS injection 80mg depomedrol was VERY helpful previously. She does not need another one now. \par -Change MTX to 17.5mg subQ.  Patient has difficulty with using vial and syringe technique due to joint pain in her hands.  She has no one at home to help her inject the med.  Will Rx pen.  \par -Stop plaquenil due to questionable retinal changes. If needed for arthritis, can uptitrate Humira or MTX. \par -Continue Humira. Cont dosing frequency to q10 days.  She has had 35% improvement with Humira.  \par - Hep B/C/Quantiferon negative 11/16/20.\par -Declined Pneumonia vaccination. Declined flu shot. \par -Most recent VECTRA is 33, slightly higher than last one.  VECTRA 2019 was in mild range at 29, routine labs unremarkable, inflammatory markers normal. VECTRA 8/14/18 was 34.\par -Recommend oral hydration and use of rewetting eye drops for secondary sjogrens. Advised compliance with eye drops. She is now doing eye drops more regularly. She takes theratears. No sign dry mouth today.\par \par \par R Knee pain- may be meniscal/soft tissue\par -I did not palpate an effusion\par -Injected right knee today 8/5/21 with 80mg depomedrol\par -Injected left knee with 80mg depomedrol 5/28/20\par -Consider HA injections\par \par Pes planus-\par -ankle pain is not active today. \par -podiatry referral\par \par Right shoulder rotator cuff tendonitis/impingement- not an issue today\par -declining CS injection today. Doesn't feel it is bad enough at present. \par \par \par neck pain/Low back pain- stable, improved with diclofenac Rx'd by her spine doctor. \par -Rxd tizanidine 2mg QHS PRN, though she never took it due to "medication averse" in general.\par -Had c-spine XR 2/22/18. Has C5-C6 retrolisthesis, no evidenc at that time for atlantoaxial instability. +Lordosis reversal related to muscle spasm and/or underlying degenerative change. \par -If persistent, may need re-imaging. \par \par Neuropathy- improved, but fatigue may be related. \par -cont 0mg/600mg thereafter. The fatigue did improve with removing gabapentin in AM. \par \par Secondary FMS\par -cont duloxetine 20mg daily\par \par Left ankle pain- active\par -Advised custom orthotics\par -Now off diclofenac due to renal insufficiency. If renal function is normal on next labs, and it is needed, may re-introduce cautiously/sparingly. Try using topical diclofenac for now. \par -Change shoes. Feels flats worsen this. \par - MRI left ankle which shows multiple tendinopathy, possible deltoid sprain, and some subtalar effusion/retrocalcaneal bursitis.\par -She has done PT. \par -had seen Dr. Hayden, conservative management advised. \par -Switch to MTX subQ may help this.  Consider switching Humira to other biologic such as Xeljanz down the line.  \par \par Right pes anserine bursitis- stable, currently not active\par -Injected right pes bursa with 80mg depomedrol 4/2018. She found relief with this. Does not want another one at present. \par \par \par Right GTB-improved\par -Injected 80mg depomedrol 4/2/19\par \par \par Vit D deficiency-corrected\par -continue 5000IU daily. \par \par \par Gum irregularity-\par -To ask dentist why left inner gum is more pronounced. Denies hx of smoking ever. \par \par \par f/u 2 months, labs before next visit

## 2021-10-06 NOTE — HISTORY OF PRESENT ILLNESS
[FreeTextEntry1] : 55 year old woman with hx including RA, vitamin d deficiency, diverticulitis, Right hip replacement 2018, neuropathy, presents for follow up. \par \par Meds from me\par Humira q 10 days\par MTX 7 tabs\par Folic acid\par Gabapentin 300 qAM and 600qPM\par \par She has 10/10 pain in right knee. \par She noticed the issue after driving in her car and moving leg between pedals. \par She also notices her hands are more achy, and so are her ankles.  \par She thinks perhaps going off the PLQ triggered this.  \par \par No oral sores, hair loss.\par She started Humira 8/31/18. She feels it helps. She is now taking it q10 days\par She saw a retina specialist, Dr. Dumont who prefers she holds off on PLQ given some retina changes. \par \par Didn't take tizanidine, never started, didn't feel she wanted to take another med.\par Feels duloxetine was helpful, she wants to continue this. \par \par Feels gabapentin helped her zings/throbs. She went off of it while she was on vacation, but realized her zings started to come back, so she is back on gabapentin. \par

## 2021-10-06 NOTE — PROCEDURE
[FreeTextEntry1] : Procedure: Right knee IA CS injection\par Date: 08/05/21\par The procedure risks was discussed with the patient.\par Indications: therapeutic purposes \par #1 site identified in the right knee . Verbal consent was obtained. \par Anesthesia was with ethyl chloride.\par The patient was prepped with betadine solution. \par A 25 gauge 1.5 inch needle was used.\par No fluid aspirated\par Injectables: Depomedrol 80 mg was injected into the site The Depomedrol was mixed with 1mL of xylocaine 1%. \par The patient tolerated the procedure well..\par There were no complications. Handouts/patient instructions were given to patient . patient was instructed to call if redness at site, a decrease in range of motion or an increase in pain is noted after procedure. \par  \par \par

## 2021-10-21 ENCOUNTER — APPOINTMENT (OUTPATIENT)
Dept: RHEUMATOLOGY | Facility: CLINIC | Age: 56
End: 2021-10-21
Payer: COMMERCIAL

## 2021-10-21 VITALS
BODY MASS INDEX: 33.49 KG/M2 | DIASTOLIC BLOOD PRESSURE: 72 MMHG | WEIGHT: 201 LBS | TEMPERATURE: 97.3 F | SYSTOLIC BLOOD PRESSURE: 106 MMHG | HEART RATE: 54 BPM | HEIGHT: 65 IN | OXYGEN SATURATION: 98 %

## 2021-10-21 PROCEDURE — 99214 OFFICE O/P EST MOD 30 MIN: CPT

## 2021-10-21 RX ORDER — MUPIROCIN 20 MG/G
2 OINTMENT TOPICAL
Qty: 22 | Refills: 0 | Status: DISCONTINUED | COMMUNITY
Start: 2018-03-06 | End: 2021-10-21

## 2021-10-21 RX ORDER — GLUC/MSM/COLGN2/HYAL/ANTIARTH3 375-375-20
240 (27 FE) TABLET ORAL
Refills: 0 | Status: DISCONTINUED | COMMUNITY
End: 2021-10-21

## 2021-10-21 RX ORDER — METHYLPREDNISOLONE 4 MG/1
4 TABLET ORAL
Qty: 1 | Refills: 0 | Status: DISCONTINUED | COMMUNITY
Start: 2020-08-04 | End: 2021-10-21

## 2021-10-21 RX ORDER — MUPIROCIN 20 MG/G
2 OINTMENT TOPICAL
Qty: 22 | Refills: 0 | Status: DISCONTINUED | COMMUNITY
Start: 2018-03-01 | End: 2021-10-21

## 2021-10-21 RX ORDER — CLOBETASOL PROPIONATE 0.5 MG/G
0.05 CREAM TOPICAL TWICE DAILY
Qty: 1 | Refills: 0 | Status: DISCONTINUED | COMMUNITY
Start: 2018-06-19 | End: 2021-10-21

## 2021-10-21 RX ORDER — METHOTREXATE 2.5 MG/1
2.5 TABLET ORAL
Qty: 84 | Refills: 0 | Status: DISCONTINUED | COMMUNITY
Start: 2017-06-15 | End: 2021-10-21

## 2021-10-21 RX ORDER — DICLOFENAC SODIUM 10 MG/G
1 GEL TOPICAL DAILY
Qty: 1 | Refills: 0 | Status: DISCONTINUED | COMMUNITY
Start: 2018-11-02 | End: 2021-10-21

## 2021-10-21 RX ORDER — BETAMETHASONE DIPROPIONATE 0.5 MG/G
0.05 CREAM TOPICAL
Qty: 1 | Refills: 0 | Status: DISCONTINUED | COMMUNITY
Start: 2017-06-16 | End: 2021-10-21

## 2021-10-21 NOTE — ASSESSMENT
[FreeTextEntry1] : 56 year old woman with hx of RA since 2009, hx Vitamin D deficiency, diverticulitis, neuropathy, presents for follow up of her RA. She was previously followed by Dr. Pelletier until he left [place], and had been well maintained on MTX/Folic acid, and PLQ. I added Humira 8/2018 and she is tolerating it well. Overall her RA is stable. \par \par Plan:\par \par RA/Sjogren's\par \par -Change MTX to 17.5mg subQ. Patient has difficulty with using vial and syringe technique due to joint pain in her hands. She has no one at home to help her inject the med. Will Rx pen. \par -She is back on plaquenil due to active disease.  She is on 200mg daily.  \par -Continue Humira. Cont dosing frequency to q10 days. She has had 35% improvement with Humira. \par - Hep B/C/Quantiferon negative 11/16/20.\par -Declined Pneumonia vaccination. Declined flu shot. \par -Most recent VECTRA is 33, slightly higher than last one. VECTRA 2019 was in mild range at 29, routine labs unremarkable, inflammatory markers normal. VECTRA 8/14/18 was 34.\par -Recommend oral hydration and use of rewetting eye drops for secondary sjogrens. Advised compliance with eye drops. She is now doing eye drops more regularly. She takes theratears. No sign dry mouth today.\par \par \par R Knee pain- may be meniscal/soft tissue\par -I did not palpate an effusion\par -Injected right knee  8/5/21 with 80mg depomedrol\par -Injected left knee with 80mg depomedrol 5/28/20\par -Consider HA injections\par \par Pes planus-\par -ankle pain is not active today. \par -podiatry referral\par \par Right shoulder rotator cuff tendonitis/impingement- not an issue today\par \par \par \par neck pain/Low back pain- stable, improved with diclofenac Rx'd by her spine doctor. \par -Rxd tizanidine 2mg QHS PRN, though she never took it due to "medication averse" in general.\par -Had c-spine XR 2/22/18. Has C5-C6 retrolisthesis, no evidenc at that time for atlantoaxial instability. +Lordosis reversal related to muscle spasm and/or underlying degenerative change. \par -If persistent, may need re-imaging. \par \par Neuropathy- improved, but fatigue may be related. \par -cont 0mg/600mg thereafter. The fatigue did improve with removing gabapentin in AM. \par \par Secondary FMS\par \par \par Left ankle pain- not active\par -Advised custom orthotics\par -Now off diclofenac due to renal insufficiency. If renal function is normal on next labs, and it is needed, may re-introduce cautiously/sparingly. Try using topical diclofenac for now. \par -Change shoes. Feels flats worsen this. \par - MRI left ankle which shows multiple tendinopathy, possible deltoid sprain, and some subtalar effusion/retrocalcaneal bursitis.\par -She has done PT. \par -had seen Dr. Hayden, conservative management advised. \par -Switch to MTX subQ may help this. Consider switching Humira to other biologic such as Xeljanz down the line. \par \par Right pes anserine bursitis-may be active, but controlled currently with ibuprofen 800mg BID.  \par -Injected right pes bursa with 80mg depomedrol 4/2018. She found relief with this. Does not want another one at present. \par \par \par Right GTB-improved\par -Injected 80mg depomedrol 4/2/19\par \par \par Vit D deficiency-corrected\par -continue 5000IU daily. \par \par \par Gum irregularity-\par -To ask dentist why left inner gum is more pronounced. Denies hx of smoking ever. \par \par \par f/u 3 months\par

## 2021-10-21 NOTE — HISTORY OF PRESENT ILLNESS
[FreeTextEntry1] : 55 year old woman with hx including RA, vitamin d deficiency, diverticulitis, Right hip replacement 2018, neuropathy, presents for follow up. \par \par Meds from me\par Humira q 10 days\par MTX 7 tabs\par Folic acid\par Gabapentin 300 qAM and 600qPM\par \par She has 1/10 pain, 5-10 minutes of AM stiffness.  \par She had right knee pain.\par She saw Dr. Washington who did XR right hip and knee. She was told he has severe arthritis. And was not a candidate for knee surgery.  He gave her a right CS injection.  It felt a lot better a few weeks later. \par \par 10/3/21 it's very bad pain, medially right knee.  \par She went to her Primary.  She was given ibuprofen 800mg which did help. No more constant pain.  She has been taking ibuprofen BID.  She got MRI order.  \par \par \par No oral sores, hair loss.\par She started Humira 8/31/18. She feels it helps. She is now taking it q10 days\par She saw a retina specialist, Dr. Dumont who prefers she holds off on PLQ given some retina changes. \par \par Didn't take tizanidine, never started, didn't feel she wanted to take another med.\par Feels duloxetine was helpful, she wants to continue this. \par \par Feels gabapentin helped her zings/throbs. She went off of it while she was on vacation, but realized her zings started to come back, so she is back on gabapentin. \par \par

## 2021-10-28 ENCOUNTER — APPOINTMENT (OUTPATIENT)
Dept: ORTHOPEDIC SURGERY | Facility: CLINIC | Age: 56
End: 2021-10-28
Payer: COMMERCIAL

## 2021-10-28 PROCEDURE — 99214 OFFICE O/P EST MOD 30 MIN: CPT

## 2021-10-28 NOTE — HISTORY OF PRESENT ILLNESS
[de-identified] : Patient is a 56-year-old female coming in today complaining of right knee pain.   She states that the pain is worsening in the right knee as well as buckling more frequently in the recent months.  The right knee buckled as recently as 2 days ago and she wears a knee brace or knee sleeve to help alleviate the symptoms.  She rates the pain is a 6 out of 10 on the right. she denies any numbness or tingling in the right or left lower extremity.  Patient states that she takes 800 mg of ibuprofen to help deal with the pain associated with her complaint and that she repeats that dosage several times a day.  She has had right hip pain in the past as well and was previously injected by an outside provider in the right knee with cortisone at the end of September 2021.

## 2021-10-28 NOTE — ADDENDUM
[FreeTextEntry1] : I, Jessy Schilling, acted solely as a scribe for Dr. Boom Hayden on this date 10/28/2021.\par \par All medical record entries made by the Scribe were at my, Dr. Boom Hayden, direction and personally dictated by me on 10/28/2021 . I have reviewed the chart and agree that the record accurately reflects my personal performance of the history, physical exam, assessment and plan. I have also personally directed, reviewed, and agreed with the chart.	\par

## 2021-10-28 NOTE — PHYSICAL EXAM
[de-identified] : Right knee Physical Examination:\par \par General: Alert and oriented x3.  In no acute distress.  Pleasant in nature with a normal affect.  No apparent respiratory distress. \par \par Erythema, Warmth, Rubor: Negative\par Swelling/Edema: Positive\par ROM: 0-120 degrees, pain with hyperflexion. \par Javier's Test: Positive \par Medial Joint Line TTP: Positive\par Lateral Joint Line TTP: Negative\par Lachman Exam/Anterior Drawer/Pivot Shift Test: Negative \par MCL Pain: Negative\par LCL Pain: Negative\par Iliotibial Band Pain: Negative\par Patellofemoral Joint Pain: Positive\par Patellar Tendon TTP: Negative\par Pes Anserinus TTP: Negative\par Homans Sign: Negative\par Posterior Knee Pain: Negative\par Neuro: Intact with no sensory or motor deficits [de-identified] : 3V of the right knee were  reviewed by me today, 10/28/2021, revealed: Osteoarthritis. \par

## 2021-10-28 NOTE — DISCUSSION/SUMMARY
[de-identified] : The patient's x-rays were reviewed and the effects of osteoarthritis were discussed with the patient.  She elected to continue with conservative treatment at this time.  The patient does not want surgery at this time but discussion was had about the osteoarthritis in 2 compartments of her right knee. we advised her to discontinue the ibuprofen 800 mg several times a day and replace that with meloxicam 15 mg once daily for the next 2 weeks.  We also gave the patient a prescription for physical therapy to help with strengthening, stretching, and modalities to help alleviate her symptoms.  We advised the patient to follow-up as needed after finishing her course of PT or if the pain becomes more debilitating.

## 2021-12-26 ENCOUNTER — RX RENEWAL (OUTPATIENT)
Age: 56
End: 2021-12-26

## 2021-12-28 ENCOUNTER — NON-APPOINTMENT (OUTPATIENT)
Age: 56
End: 2021-12-28

## 2022-01-05 ENCOUNTER — RX RENEWAL (OUTPATIENT)
Age: 57
End: 2022-01-05

## 2022-01-05 LAB
ALBUMIN SERPL ELPH-MCNC: 4.9 G/DL
ALP BLD-CCNC: 56 U/L
ALT SERPL-CCNC: 15 U/L
ANION GAP SERPL CALC-SCNC: 12 MMOL/L
AST SERPL-CCNC: 18 U/L
BASOPHILS # BLD AUTO: 0.07 K/UL
BASOPHILS NFR BLD AUTO: 1.2 %
BILIRUB SERPL-MCNC: 0.2 MG/DL
BUN SERPL-MCNC: 26 MG/DL
CALCIUM SERPL-MCNC: 10.3 MG/DL
CHLORIDE SERPL-SCNC: 101 MMOL/L
CO2 SERPL-SCNC: 30 MMOL/L
CREAT SERPL-MCNC: 1.2 MG/DL
CRP SERPL-MCNC: <3 MG/L
EOSINOPHIL # BLD AUTO: 0.22 K/UL
EOSINOPHIL NFR BLD AUTO: 3.7 %
ERYTHROCYTE [SEDIMENTATION RATE] IN BLOOD BY WESTERGREN METHOD: 29 MM/HR
GLUCOSE SERPL-MCNC: 93 MG/DL
HAV IGM SER QL: NONREACTIVE
HBV CORE IGM SER QL: NONREACTIVE
HBV SURFACE AG SER QL: NONREACTIVE
HCT VFR BLD CALC: 42.8 %
HCV AB SER QL: NONREACTIVE
HCV S/CO RATIO: 0.09 S/CO
HGB BLD-MCNC: 13.6 G/DL
IMM GRANULOCYTES NFR BLD AUTO: 0.3 %
LYMPHOCYTES # BLD AUTO: 2.28 K/UL
LYMPHOCYTES NFR BLD AUTO: 38.1 %
M TB IFN-G BLD-IMP: NEGATIVE
MAN DIFF?: NORMAL
MCHC RBC-ENTMCNC: 29.8 PG
MCHC RBC-ENTMCNC: 31.8 GM/DL
MCV RBC AUTO: 93.7 FL
MONOCYTES # BLD AUTO: 0.55 K/UL
MONOCYTES NFR BLD AUTO: 9.2 %
NEUTROPHILS # BLD AUTO: 2.85 K/UL
NEUTROPHILS NFR BLD AUTO: 47.5 %
PLATELET # BLD AUTO: 273 K/UL
POTASSIUM SERPL-SCNC: 4.1 MMOL/L
PROT SERPL-MCNC: 7.8 G/DL
QUANTIFERON TB PLUS MITOGEN MINUS NIL: 9.64 IU/ML
QUANTIFERON TB PLUS NIL: 0.02 IU/ML
QUANTIFERON TB PLUS TB1 MINUS NIL: 0.01 IU/ML
QUANTIFERON TB PLUS TB2 MINUS NIL: 0 IU/ML
RBC # BLD: 4.57 M/UL
RBC # FLD: 16.8 %
SODIUM SERPL-SCNC: 143 MMOL/L
WBC # FLD AUTO: 5.99 K/UL

## 2022-01-25 ENCOUNTER — APPOINTMENT (OUTPATIENT)
Dept: RHEUMATOLOGY | Facility: CLINIC | Age: 57
End: 2022-01-25
Payer: COMMERCIAL

## 2022-01-25 VITALS
WEIGHT: 204 LBS | HEIGHT: 65 IN | SYSTOLIC BLOOD PRESSURE: 112 MMHG | OXYGEN SATURATION: 94 % | TEMPERATURE: 97.4 F | BODY MASS INDEX: 33.99 KG/M2 | DIASTOLIC BLOOD PRESSURE: 75 MMHG | HEART RATE: 98 BPM

## 2022-01-25 PROCEDURE — 99214 OFFICE O/P EST MOD 30 MIN: CPT

## 2022-01-25 RX ORDER — MELOXICAM 15 MG/1
15 TABLET ORAL
Qty: 30 | Refills: 1 | Status: DISCONTINUED | COMMUNITY
Start: 2021-10-28 | End: 2022-01-25

## 2022-01-25 RX ORDER — HYDROXYCHLOROQUINE SULFATE 400 MG/1
TABLET ORAL
Refills: 0 | Status: DISCONTINUED | COMMUNITY
End: 2022-01-25

## 2022-01-25 NOTE — ASSESSMENT
[FreeTextEntry1] : 56 year old woman with hx of RA since 2009, hx Vitamin D deficiency, diverticulitis, neuropathy, presents for follow up of her RA. She was previously followed by Dr. Pelletier.  Her RA is currently active.  Will adjust meds.  \par Plan:\par \par RA/Sjogren's\par -Rx prednisone 20mg daily x 2 weeks, then stop\par -Cont MTX to 17.5mg subQ. Patient has difficulty with using vial and syringe technique due to joint pain in her hands. She has no one at home to help her inject the med. Will Rx pen. \par -Stop plaquenil due to retinal changes.  \par -Switch humira to orencia infusion\par - Risks and benefits of orencia were discussed including but not limited to increased risk of infections, and worsening of asthma/COPD, allergic and infusion reactions.\par - Hep B/C/Quantiferon negative 11/16/20.\par -Declined Pneumonia vaccination. Declined flu shot. \par -Most recent VECTRA is 33, slightly higher than last one. VECTRA 2019 was in mild range at 29, routine labs unremarkable, inflammatory markers normal. VECTRA 8/14/18 was 34.\par -Recommend oral hydration and use of rewetting eye drops for secondary sjogrens. Advised compliance with eye drops. She is now doing eye drops more regularly. She takes theratears. No sign dry mouth today.\par \par \par R Knee pain- may be meniscal/soft tissue\par -Follows with Dr. Hayden.  Had meloxicam Rx which helped knee.  \par -Injected right knee 8/5/21 with 80mg depomedrol\par -Injected left knee with 80mg depomedrol 5/28/20\par -Consider HA injections\par \par Pes planus-\par -ankle pain is not active today. \par -podiatry referral\par \par Right shoulder rotator cuff tendonitis/impingement- not an issue today\par \par \par \par neck pain/Low back pain- stable, improved with diclofenac Rx'd by her spine doctor. \par -Rxd tizanidine 2mg QHS PRN, though she never took it due to "medication averse" in general.\par -Had c-spine XR 2/22/18. Has C5-C6 retrolisthesis, no evidenc at that time for atlantoaxial instability. +Lordosis reversal related to muscle spasm and/or underlying degenerative change. \par -advised eval with PM\par \par Neuropathy- improved, but fatigue may be related. \par -cont 300mg/600mg thereafter. The fatigue did improve with removing gabapentin in AM. \par consider increasing night time dosing next visit.  \par \par Secondary FMS\par Has tender points\par -Inc duloxetine from 20mg to 40mg daily\par \par Left ankle pain- not active\par -Advised custom orthotics\par -Now off diclofenac due to renal insufficiency. If renal function is normal on next labs, and it is needed, may re-introduce cautiously/sparingly. Try using topical diclofenac for now. \par -Change shoes. Feels flats worsen this. \par - MRI left ankle which shows multiple tendinopathy, possible deltoid sprain, and some subtalar effusion/retrocalcaneal bursitis.\par -She has done PT. \par -had seen Dr. Hayden, conservative management advised. \par \par \par Right pes anserine bursitis-may be active, but controlled currently with ibuprofen 800mg BID. \par -Injected right pes bursa with 80mg depomedrol 4/2018. She found relief with this. Does not want another one at present. \par \par \par Right GTB-improved\par -Injected 80mg depomedrol 4/2/19\par \par \par Vit D deficiency-corrected\par -continue 5000IU daily. \par \par \par Gum irregularity-\par -To ask dentist why left inner gum is more pronounced. Denies hx of smoking ever. \par \par \par f/u 3 months\par labs before next visit\par \par

## 2022-01-25 NOTE — HISTORY OF PRESENT ILLNESS
[FreeTextEntry1] : 56 year old woman with hx including RA, vitamin d deficiency, diverticulitis, Right hip replacement 2018, neuropathy, presents for follow up. \par \par Meds from me\par Humira q 10 days\par MTX 17.5mg subQ\par Folic acid\par Gabapentin 300 qAM and 600qPM\par \par Reports neck pain, knee pain, foot pain.  \par She also has a lot of tingling and zings going on in her arms/legs.  \par Has pain running down her leg.  \par \par She saw Dr. Washington who did XR right hip and knee. She was told he has severe arthritis. And was not a candidate for knee surgery. He gave her a right CS injection. It felt a lot better a few weeks later. \par \par 10/3/21 it's very bad pain, medially right knee. \par She went to her Primary. She was given ibuprofen 800mg which did help. No more constant pain. She has been taking ibuprofen BID. She got MRI order. \par \par \par No oral sores, hair loss.\par She started Humira 8/31/18. She feels it helps. She is now taking it q10 days\par She saw a retina specialist, Dr. Dumont who prefers she holds off on PLQ given some retina changes. \par \par Didn't take tizanidine, never started, didn't feel she wanted to take another med.\par Feels duloxetine was helpful, she wants to continue this. \par \par Feels gabapentin helped her zings/throbs. She went off of it while she was on vacation, but realized her zings started to come back, so she is back on gabapentin. \par \par \par

## 2022-03-23 ENCOUNTER — RX RENEWAL (OUTPATIENT)
Age: 57
End: 2022-03-23

## 2022-03-28 ENCOUNTER — RX RENEWAL (OUTPATIENT)
Age: 57
End: 2022-03-28

## 2022-04-04 RX ORDER — ABATACEPT 250 MG/15ML
250 INJECTION, POWDER, LYOPHILIZED, FOR SOLUTION INTRAVENOUS
Qty: 0 | Refills: 0 | Status: COMPLETED | OUTPATIENT
Start: 2022-04-04 | End: 1900-01-01

## 2022-04-07 ENCOUNTER — APPOINTMENT (OUTPATIENT)
Dept: RHEUMATOLOGY | Facility: CLINIC | Age: 57
End: 2022-04-07

## 2022-04-21 ENCOUNTER — APPOINTMENT (OUTPATIENT)
Dept: RHEUMATOLOGY | Facility: CLINIC | Age: 57
End: 2022-04-21
Payer: COMMERCIAL

## 2022-04-21 ENCOUNTER — APPOINTMENT (OUTPATIENT)
Dept: RHEUMATOLOGY | Facility: CLINIC | Age: 57
End: 2022-04-21

## 2022-04-21 VITALS
OXYGEN SATURATION: 98 % | SYSTOLIC BLOOD PRESSURE: 108 MMHG | RESPIRATION RATE: 18 BRPM | DIASTOLIC BLOOD PRESSURE: 70 MMHG | HEART RATE: 62 BPM

## 2022-04-21 VITALS
SYSTOLIC BLOOD PRESSURE: 142 MMHG | OXYGEN SATURATION: 98 % | HEART RATE: 63 BPM | RESPIRATION RATE: 18 BRPM | TEMPERATURE: 96.9 F | DIASTOLIC BLOOD PRESSURE: 79 MMHG

## 2022-04-21 PROCEDURE — 96413 CHEMO IV INFUSION 1 HR: CPT

## 2022-04-21 RX ORDER — ABATACEPT 250 MG/15ML
250 INJECTION, POWDER, LYOPHILIZED, FOR SOLUTION INTRAVENOUS
Qty: 0 | Refills: 0 | Status: COMPLETED | OUTPATIENT
Start: 2022-04-04

## 2022-05-10 ENCOUNTER — APPOINTMENT (OUTPATIENT)
Dept: RHEUMATOLOGY | Facility: CLINIC | Age: 57
End: 2022-05-10
Payer: COMMERCIAL

## 2022-05-10 VITALS
SYSTOLIC BLOOD PRESSURE: 120 MMHG | DIASTOLIC BLOOD PRESSURE: 75 MMHG | RESPIRATION RATE: 18 BRPM | TEMPERATURE: 97.7 F | HEART RATE: 75 BPM | OXYGEN SATURATION: 93 %

## 2022-05-10 VITALS
RESPIRATION RATE: 18 BRPM | BODY MASS INDEX: 33.66 KG/M2 | DIASTOLIC BLOOD PRESSURE: 70 MMHG | SYSTOLIC BLOOD PRESSURE: 120 MMHG | HEIGHT: 65 IN | HEART RATE: 71 BPM | WEIGHT: 202 LBS | OXYGEN SATURATION: 92 %

## 2022-05-10 DIAGNOSIS — M25.561 PAIN IN RIGHT KNEE: ICD-10-CM

## 2022-05-10 PROCEDURE — 99214 OFFICE O/P EST MOD 30 MIN: CPT

## 2022-05-10 PROCEDURE — ZZZZZ: CPT

## 2022-05-10 RX ORDER — ABATACEPT 250 MG/15ML
250 INJECTION, POWDER, LYOPHILIZED, FOR SOLUTION INTRAVENOUS
Qty: 0 | Refills: 0 | Status: COMPLETED | OUTPATIENT
Start: 2022-04-29

## 2022-05-10 RX ORDER — ADALIMUMAB 40MG/0.4ML
40 KIT SUBCUTANEOUS
Qty: 3 | Refills: 3 | Status: DISCONTINUED | COMMUNITY
Start: 2018-08-31 | End: 2022-05-10

## 2022-05-10 NOTE — ASSESSMENT
[FreeTextEntry1] : 57 year old woman with hx of RA since 2009, hx Vitamin D deficiency, diverticulitis, neuropathy, presents for follow up of her RA. She was previously followed by Dr. Pelletier. Her RA is currently active. Will adjust meds. \par Plan:\par \par RA/Sjogren's\par -Rx prednisone 20mg daily x 2 weeks, then stop\par -Stop humira.  Patient admits accidentally taking humira instead of MTX.  \par -Restart MTX 17.5mg subQ. Patient has difficulty with using vial and syringe technique due to joint pain in her hands. She has no one at home to help her inject the med. Will Rx pen. \par -Stop plaquenil due to retinal changes. \par -continue orencia infusion\par - Risks and benefits of orencia were discussed including but not limited to increased risk of infections, and worsening of asthma/COPD, allergic and infusion reactions.\par - Hep B/C/Quantiferon negative 12/30/21\par -Declined Pneumonia vaccination. Declined flu shot. \par -Most recent VECTRA is 33, slightly higher than last one. VECTRA 2019 was in mild range at 29, routine labs unremarkable, inflammatory markers normal. VECTRA 8/14/18 was 34.\par -Recommend oral hydration and use of rewetting eye drops for secondary sjogrens. Advised compliance with eye drops. She is now doing eye drops more regularly. She takes theratears. No sign dry mouth today.\par \par Right buttock pain\par -Rx meloxicam prn\par \par R Knee pain- may be meniscal/soft tissue\par -Follows with Dr. Hayden. Had meloxicam Rx which helped knee. \par -Injected right knee 8/5/21 with 80mg depomedrol\par -Injected left knee with 80mg depomedrol 5/28/20\par -Consider HA injections\par \par Pes planus-\par -ankle pain is not active today. \par -podiatry referral\par \par Right shoulder rotator cuff tendonitis/impingement- not an issue today\par \par neck pain/Low back pain- stable, improved with diclofenac Rx'd by her spine doctor. \par -Rxd tizanidine 2mg QHS PRN, though she never took it due to "medication averse" in general.\par -Had c-spine XR 2/22/18. Has C5-C6 retrolisthesis, no evidenc at that time for atlantoaxial instability. +Lordosis reversal related to muscle spasm and/or underlying degenerative change. \par -advised eval with PM\par \par Neuropathy- improved, but fatigue may be related. \par -cont 300mg/600mg thereafter. The fatigue did improve with removing gabapentin in AM. \par consider increasing night time dosing next visit. \par \par Secondary FMS\par Has tender points\par -Inc duloxetine from 20mg to 40mg daily\par \par Left ankle pain- not active\par -Advised custom orthotics\par -Now off diclofenac due to renal insufficiency. If renal function is normal on next labs, and it is needed, may re-introduce cautiously/sparingly. Try using topical diclofenac for now. \par -Change shoes. Feels flats worsen this. \par - MRI left ankle which shows multiple tendinopathy, possible deltoid sprain, and some subtalar effusion/retrocalcaneal bursitis.\par -She has done PT. \par -had seen Dr. Hayden, conservative management advised. \par \par \par Right pes anserine bursitis-may be active, but controlled currently with ibuprofen 800mg BID. \par -Injected right pes bursa with 80mg depomedrol 4/2018. She found relief with this. Does not want another one at present. \par \par \par Right GTB-improved\par -Injected 80mg depomedrol 4/2/19\par \par \par Vit D deficiency-corrected\par -continue 5000IU daily. \par \par \par Gum irregularity-\par -To ask dentist why left inner gum is more pronounced. Denies hx of smoking ever. \par \par \par f/u 3 months\par labs before next visit\par \par  1+ R/L leg

## 2022-05-10 NOTE — HISTORY OF PRESENT ILLNESS
[FreeTextEntry1] : 57  year old woman with hx including RA, vitamin d deficiency, diverticulitis, Right hip replacement 2018, neuropathy, presents for follow up. \par \par Meds from me\par Orencia\par MTX 17.5mg subQ (states she was accidentally taking humira instead of injectable MTX\par Folic acid\par Gabapentin 300 qAM and 600qPM\par \par She took meloxicam which was helpful for knees.\par Only takes it when she really needs it.\par \par \par

## 2022-05-25 ENCOUNTER — APPOINTMENT (OUTPATIENT)
Dept: RHEUMATOLOGY | Facility: CLINIC | Age: 57
End: 2022-05-25
Payer: COMMERCIAL

## 2022-05-25 VITALS
HEART RATE: 75 BPM | SYSTOLIC BLOOD PRESSURE: 135 MMHG | TEMPERATURE: 97.4 F | RESPIRATION RATE: 18 BRPM | OXYGEN SATURATION: 96 % | DIASTOLIC BLOOD PRESSURE: 87 MMHG

## 2022-05-25 LAB
BASOPHILS # BLD AUTO: 0.04 K/UL
BASOPHILS NFR BLD AUTO: 0.4 %
EOSINOPHIL # BLD AUTO: 0.01 K/UL
EOSINOPHIL NFR BLD AUTO: 0.1 %
HCT VFR BLD CALC: 40.6 %
HGB BLD-MCNC: 13 G/DL
IMM GRANULOCYTES NFR BLD AUTO: 0.6 %
LYMPHOCYTES # BLD AUTO: 1.56 K/UL
LYMPHOCYTES NFR BLD AUTO: 16 %
MAN DIFF?: NORMAL
MCHC RBC-ENTMCNC: 29.3 PG
MCHC RBC-ENTMCNC: 32 GM/DL
MCV RBC AUTO: 91.6 FL
MONOCYTES # BLD AUTO: 0.09 K/UL
MONOCYTES NFR BLD AUTO: 0.9 %
NEUTROPHILS # BLD AUTO: 8.01 K/UL
NEUTROPHILS NFR BLD AUTO: 82 %
PLATELET # BLD AUTO: 269 K/UL
RBC # BLD: 4.43 M/UL
RBC # FLD: 16.2 %
WBC # FLD AUTO: 9.77 K/UL

## 2022-05-25 PROCEDURE — 36415 COLL VENOUS BLD VENIPUNCTURE: CPT

## 2022-05-25 PROCEDURE — 96413 CHEMO IV INFUSION 1 HR: CPT

## 2022-05-25 RX ORDER — ABATACEPT 250 MG/15ML
250 INJECTION, POWDER, LYOPHILIZED, FOR SOLUTION INTRAVENOUS
Qty: 0 | Refills: 0 | Status: COMPLETED | OUTPATIENT
Start: 2022-05-19

## 2022-05-26 ENCOUNTER — NON-APPOINTMENT (OUTPATIENT)
Age: 57
End: 2022-05-26

## 2022-05-30 LAB
25(OH)D3 SERPL-MCNC: 50.2 NG/ML
ALBUMIN SERPL ELPH-MCNC: 4.6 G/DL
ALP BLD-CCNC: 62 U/L
ALT SERPL-CCNC: 20 U/L
ANION GAP SERPL CALC-SCNC: 17 MMOL/L
AST SERPL-CCNC: 21 U/L
BILIRUB SERPL-MCNC: 0.2 MG/DL
BUN SERPL-MCNC: 38 MG/DL
CALCIUM SERPL-MCNC: 9.8 MG/DL
CHLORIDE SERPL-SCNC: 101 MMOL/L
CO2 SERPL-SCNC: 22 MMOL/L
CREAT SERPL-MCNC: 1.02 MG/DL
CRP SERPL-MCNC: <3 MG/L
EGFR: 64 ML/MIN/1.73M2
ERYTHROCYTE [SEDIMENTATION RATE] IN BLOOD BY WESTERGREN METHOD: 18 MM/HR
GLUCOSE SERPL-MCNC: 191 MG/DL
POTASSIUM SERPL-SCNC: 4.3 MMOL/L
PROT SERPL-MCNC: 7 G/DL
SODIUM SERPL-SCNC: 140 MMOL/L

## 2022-06-06 ENCOUNTER — RX RENEWAL (OUTPATIENT)
Age: 57
End: 2022-06-06

## 2022-06-09 ENCOUNTER — RX RENEWAL (OUTPATIENT)
Age: 57
End: 2022-06-09

## 2022-06-22 ENCOUNTER — APPOINTMENT (OUTPATIENT)
Dept: RHEUMATOLOGY | Facility: CLINIC | Age: 57
End: 2022-06-22
Payer: COMMERCIAL

## 2022-06-22 VITALS
OXYGEN SATURATION: 97 % | TEMPERATURE: 98 F | RESPIRATION RATE: 18 BRPM | HEART RATE: 62 BPM | SYSTOLIC BLOOD PRESSURE: 145 MMHG | DIASTOLIC BLOOD PRESSURE: 87 MMHG

## 2022-06-22 VITALS
OXYGEN SATURATION: 96 % | TEMPERATURE: 98 F | SYSTOLIC BLOOD PRESSURE: 130 MMHG | HEART RATE: 67 BPM | DIASTOLIC BLOOD PRESSURE: 88 MMHG | RESPIRATION RATE: 18 BRPM

## 2022-06-22 PROCEDURE — 96413 CHEMO IV INFUSION 1 HR: CPT

## 2022-06-22 RX ORDER — ABATACEPT 250 MG/15ML
250 INJECTION, POWDER, LYOPHILIZED, FOR SOLUTION INTRAVENOUS
Qty: 0 | Refills: 0 | Status: COMPLETED | OUTPATIENT
Start: 2022-06-16

## 2022-07-11 ENCOUNTER — RX RENEWAL (OUTPATIENT)
Age: 57
End: 2022-07-11

## 2022-07-21 ENCOUNTER — APPOINTMENT (OUTPATIENT)
Dept: RHEUMATOLOGY | Facility: CLINIC | Age: 57
End: 2022-07-21

## 2022-07-21 VITALS
SYSTOLIC BLOOD PRESSURE: 152 MMHG | DIASTOLIC BLOOD PRESSURE: 78 MMHG | OXYGEN SATURATION: 98 % | HEART RATE: 64 BPM | RESPIRATION RATE: 16 BRPM

## 2022-07-21 VITALS
HEART RATE: 72 BPM | DIASTOLIC BLOOD PRESSURE: 82 MMHG | RESPIRATION RATE: 16 BRPM | OXYGEN SATURATION: 96 % | SYSTOLIC BLOOD PRESSURE: 167 MMHG

## 2022-07-21 DIAGNOSIS — R09.89 OTHER SPECIFIED SYMPTOMS AND SIGNS INVOLVING THE CIRCULATORY AND RESPIRATORY SYSTEMS: ICD-10-CM

## 2022-07-21 PROCEDURE — 96413 CHEMO IV INFUSION 1 HR: CPT

## 2022-07-21 RX ORDER — ABATACEPT 250 MG/15ML
250 INJECTION, POWDER, LYOPHILIZED, FOR SOLUTION INTRAVENOUS
Qty: 0 | Refills: 0 | Status: COMPLETED | OUTPATIENT
Start: 2022-07-14

## 2022-08-04 ENCOUNTER — LABORATORY RESULT (OUTPATIENT)
Age: 57
End: 2022-08-04

## 2022-08-04 ENCOUNTER — RX RENEWAL (OUTPATIENT)
Age: 57
End: 2022-08-04

## 2022-08-11 ENCOUNTER — APPOINTMENT (OUTPATIENT)
Dept: RHEUMATOLOGY | Facility: CLINIC | Age: 57
End: 2022-08-11

## 2022-08-11 VITALS
DIASTOLIC BLOOD PRESSURE: 88 MMHG | TEMPERATURE: 97.9 F | HEART RATE: 67 BPM | SYSTOLIC BLOOD PRESSURE: 139 MMHG | OXYGEN SATURATION: 97 % | WEIGHT: 24 LBS | BODY MASS INDEX: 3.99 KG/M2

## 2022-08-11 DIAGNOSIS — M65.30 TRIGGER FINGER, UNSPECIFIED FINGER: ICD-10-CM

## 2022-08-11 PROCEDURE — 99214 OFFICE O/P EST MOD 30 MIN: CPT

## 2022-08-11 RX ORDER — PREDNISONE 20 MG/1
20 TABLET ORAL
Qty: 14 | Refills: 0 | Status: DISCONTINUED | COMMUNITY
Start: 2017-12-12 | End: 2022-08-11

## 2022-08-11 RX ORDER — IBUPROFEN 800 MG/1
800 TABLET, FILM COATED ORAL
Qty: 30 | Refills: 0 | Status: DISCONTINUED | COMMUNITY
Start: 2022-06-07

## 2022-08-11 RX ORDER — SIMVASTATIN 10 MG/1
10 TABLET, FILM COATED ORAL
Qty: 90 | Refills: 0 | Status: DISCONTINUED | COMMUNITY
Start: 2022-05-25

## 2022-08-11 NOTE — ASSESSMENT
[FreeTextEntry1] : 57 year old woman with hx of RA since 2009, hx Vitamin D deficiency, diverticulitis, neuropathy, presents for follow up of her RA. She was previously followed by Dr. Pelletier. Her RA is currently active. Will adjust meds. \par Plan:\par \par RA/Sjogren's\par -Rx prednisone 20mg daily x 2 weeks, then stop\par -on MTX 17.5mg subQ. Patient has difficulty with using vial and syringe technique due to joint pain in her hands. She has no one at home to help her inject the med. Will Rx pen. \par -Stopped plaquenil due to retinal changes. \par -continue orencia infusion. Will give 6 month trial.\par - Hep B/C/Quantiferon negative 12/30/21\par -Declined Pneumonia vaccination. Declined flu shot. \par -Most recent VECTRA is 33, slightly higher than last one. VECTRA 2019 was in mild range at 29, routine labs unremarkable, inflammatory markers normal. VECTRA 8/14/18 was 34.\par -Recommend oral hydration and use of rewetting eye drops for secondary sjogrens. Advised compliance with eye drops. She is now doing eye drops more regularly. She takes theratears. No sign dry mouth today.\par \par lateral epicondylitis/tendinopathy/trigger finger r 4th digit\par -rx meloxicam 15mg prn\par -activity modification advised\par -tennis elbow brace advised\par -If trigger finger persistent after activity modification, can inject with CT.  \par \par Right buttock pain\par -Rx meloxicam prn\par \par R Knee pain- may be meniscal/soft tissue\par -Follows with Dr. Hayden. Had meloxicam Rx which helped knee. \par -Injected right knee 8/5/21 with 80mg depomedrol\par -Injected left knee with 80mg depomedrol 5/28/20\par -Consider HA injections\par \par Pes planus-\par -ankle pain\par -podiatry previously advised\par \par Right shoulder rotator cuff tendonitis/impingement- not an issue today\par \par neck pain/Low back pain- \par -Rxd tizanidine 2mg QHS PRN, though she never took it due to "medication averse" in general.\par -Had c-spine XR 2/22/18. Has C5-C6 retrolisthesis, no evidence at that time for atlantoaxial instability. +Lordosis reversal related to muscle spasm and/or underlying degenerative change. \par -advised eval with PM\par \par Neuropathy- improved, but fatigue may be related. \par -cont 300mg/600mg thereafter. The fatigue did improve with removing gabapentin in AM. \par consider increasing night time dosing next visit. \par \par Secondary FMS\par Has tender points\par -cont duloxetine 40mg daily\par \par Left ankle pain- not active\par -Advised custom orthotics\par -Change shoes. Feels flats worsen this. \par - MRI left ankle which shows multiple tendinopathy, possible deltoid sprain, and some subtalar effusion/retrocalcaneal bursitis.\par -She has done PT. \par -had seen Dr. Hayden, conservative management advised. \par \par \par Right pes anserine bursitis-not currently active, but was controlled currently with ibuprofen 800mg BID previously.\par -Injected right pes bursa with 80mg depomedrol 4/2018. She found relief with this. Does not want another one at present. \par \par \par Right GTB-improved\par -Injected 80mg depomedrol 4/2/19\par \par \par Vit D deficiency-corrected\par -continue 5000IU daily. \par \par \par Gum irregularity-\par -To ask dentist why left inner gum is more pronounced. Denies hx of smoking ever. \par \par \par f/u october 2022\par labs before next visit\par \par . \par

## 2022-08-11 NOTE — HISTORY OF PRESENT ILLNESS
[FreeTextEntry1] : 57 year old woman with hx including RA, vitamin d deficiency, diverticulitis, Right hip replacement 2018, neuropathy, presents for follow up. \par \par pain level is 8-10/10\par Neck/Elbows, then hand and ankles. Middle joints in the hand. right CMC also bothers her.   \par Right 4th finger is triggering\par \par She had been started on cholesterol medication, and was started on jan or feb 2022, stopped 3 weeks ago. \par \par Meds from me\par Orencia 4/7/22\par MTX 17.5mg subQ (states she was accidentally taking humira instead of injectable MTX\par Folic acid\par Gabapentin 300 qAM and 600qPM\par \par She took meloxicam which was helpful for knees.\par Only takes it when she really needs it.\par \par

## 2022-08-15 ENCOUNTER — APPOINTMENT (OUTPATIENT)
Dept: RHEUMATOLOGY | Facility: CLINIC | Age: 57
End: 2022-08-15

## 2022-08-15 VITALS
RESPIRATION RATE: 17 BRPM | SYSTOLIC BLOOD PRESSURE: 137 MMHG | TEMPERATURE: 98 F | HEART RATE: 66 BPM | OXYGEN SATURATION: 96 % | DIASTOLIC BLOOD PRESSURE: 79 MMHG

## 2022-08-15 VITALS
DIASTOLIC BLOOD PRESSURE: 72 MMHG | OXYGEN SATURATION: 97 % | TEMPERATURE: 98 F | HEART RATE: 96 BPM | SYSTOLIC BLOOD PRESSURE: 105 MMHG | RESPIRATION RATE: 17 BRPM

## 2022-08-15 PROCEDURE — 96413 CHEMO IV INFUSION 1 HR: CPT

## 2022-08-15 RX ORDER — ABATACEPT 250 MG/15ML
250 INJECTION, POWDER, LYOPHILIZED, FOR SOLUTION INTRAVENOUS
Qty: 0 | Refills: 0 | Status: COMPLETED | OUTPATIENT
Start: 2022-08-11

## 2022-09-21 ENCOUNTER — APPOINTMENT (OUTPATIENT)
Dept: RHEUMATOLOGY | Facility: CLINIC | Age: 57
End: 2022-09-21

## 2022-09-21 VITALS
RESPIRATION RATE: 18 BRPM | OXYGEN SATURATION: 97 % | SYSTOLIC BLOOD PRESSURE: 146 MMHG | DIASTOLIC BLOOD PRESSURE: 81 MMHG | TEMPERATURE: 97.6 F | HEART RATE: 76 BPM

## 2022-09-21 VITALS
HEART RATE: 56 BPM | OXYGEN SATURATION: 96 % | SYSTOLIC BLOOD PRESSURE: 158 MMHG | DIASTOLIC BLOOD PRESSURE: 82 MMHG | RESPIRATION RATE: 18 BRPM

## 2022-09-21 PROCEDURE — 96413 CHEMO IV INFUSION 1 HR: CPT

## 2022-09-21 RX ORDER — ABATACEPT 250 MG/15ML
250 INJECTION, POWDER, LYOPHILIZED, FOR SOLUTION INTRAVENOUS
Qty: 0 | Refills: 0 | Status: COMPLETED | OUTPATIENT
Start: 2022-09-08

## 2022-10-03 ENCOUNTER — RX RENEWAL (OUTPATIENT)
Age: 57
End: 2022-10-03

## 2022-10-19 ENCOUNTER — APPOINTMENT (OUTPATIENT)
Dept: RHEUMATOLOGY | Facility: CLINIC | Age: 57
End: 2022-10-19

## 2022-10-19 VITALS
HEART RATE: 66 BPM | SYSTOLIC BLOOD PRESSURE: 124 MMHG | OXYGEN SATURATION: 95 % | RESPIRATION RATE: 18 BRPM | DIASTOLIC BLOOD PRESSURE: 75 MMHG | TEMPERATURE: 97 F

## 2022-10-19 VITALS
SYSTOLIC BLOOD PRESSURE: 143 MMHG | DIASTOLIC BLOOD PRESSURE: 87 MMHG | HEART RATE: 64 BPM | RESPIRATION RATE: 18 BRPM | OXYGEN SATURATION: 97 %

## 2022-10-19 LAB
ALBUMIN SERPL ELPH-MCNC: 4.6 G/DL
ALP BLD-CCNC: 62 U/L
ALT SERPL-CCNC: 26 U/L
ANION GAP SERPL CALC-SCNC: 13 MMOL/L
AST SERPL-CCNC: 27 U/L
BASOPHILS # BLD AUTO: 0.08 K/UL
BASOPHILS NFR BLD AUTO: 0.9 %
BILIRUB SERPL-MCNC: 0.4 MG/DL
BUN SERPL-MCNC: 24 MG/DL
CALCIUM SERPL-MCNC: 10 MG/DL
CHLORIDE SERPL-SCNC: 103 MMOL/L
CO2 SERPL-SCNC: 25 MMOL/L
CREAT SERPL-MCNC: 1.07 MG/DL
CRP SERPL-MCNC: 5 MG/L
EGFR: 60 ML/MIN/1.73M2
EOSINOPHIL # BLD AUTO: 0.35 K/UL
EOSINOPHIL NFR BLD AUTO: 3.9 %
ERYTHROCYTE [SEDIMENTATION RATE] IN BLOOD BY WESTERGREN METHOD: 32 MM/HR
GLUCOSE SERPL-MCNC: 94 MG/DL
HCT VFR BLD CALC: 41.2 %
HGB BLD-MCNC: 12.8 G/DL
IMM GRANULOCYTES NFR BLD AUTO: 0.2 %
LYMPHOCYTES # BLD AUTO: 2.78 K/UL
LYMPHOCYTES NFR BLD AUTO: 30.9 %
MAN DIFF?: NORMAL
MCHC RBC-ENTMCNC: 29.3 PG
MCHC RBC-ENTMCNC: 31.1 GM/DL
MCV RBC AUTO: 94.3 FL
MONOCYTES # BLD AUTO: 0.65 K/UL
MONOCYTES NFR BLD AUTO: 7.2 %
NEUTROPHILS # BLD AUTO: 5.11 K/UL
NEUTROPHILS NFR BLD AUTO: 56.9 %
PLATELET # BLD AUTO: 292 K/UL
POTASSIUM SERPL-SCNC: 4 MMOL/L
PROT SERPL-MCNC: 7.4 G/DL
RBC # BLD: 4.37 M/UL
RBC # FLD: 15.9 %
SODIUM SERPL-SCNC: 141 MMOL/L
WBC # FLD AUTO: 8.99 K/UL

## 2022-10-19 PROCEDURE — 96413 CHEMO IV INFUSION 1 HR: CPT

## 2022-10-19 RX ORDER — ABATACEPT 250 MG/15ML
250 INJECTION, POWDER, LYOPHILIZED, FOR SOLUTION INTRAVENOUS
Qty: 0 | Refills: 0 | Status: COMPLETED | OUTPATIENT
Start: 2022-10-06

## 2022-10-25 ENCOUNTER — APPOINTMENT (OUTPATIENT)
Dept: RHEUMATOLOGY | Facility: CLINIC | Age: 57
End: 2022-10-25

## 2022-10-25 VITALS
HEIGHT: 65 IN | TEMPERATURE: 97.8 F | BODY MASS INDEX: 32.49 KG/M2 | SYSTOLIC BLOOD PRESSURE: 135 MMHG | OXYGEN SATURATION: 97 % | WEIGHT: 195 LBS | DIASTOLIC BLOOD PRESSURE: 84 MMHG | HEART RATE: 71 BPM

## 2022-10-25 DIAGNOSIS — M77.11 LATERAL EPICONDYLITIS, RIGHT ELBOW: ICD-10-CM

## 2022-10-25 DIAGNOSIS — M77.12 LATERAL EPICONDYLITIS, RIGHT ELBOW: ICD-10-CM

## 2022-10-25 PROCEDURE — 99214 OFFICE O/P EST MOD 30 MIN: CPT

## 2022-10-25 NOTE — HISTORY OF PRESENT ILLNESS
[FreeTextEntry1] : 57 year old woman with hx including RA, vitamin d deficiency, diverticulitis, Right hip replacement 2018, neuropathy, presents for follow up. \par \par pain level is 2/10\par 30 min AM stiffness. \par \par She got a canker sore on day of last infusion, 10/19/22.\par The day before that she felt tired. \par Thursday/friday she had fatigue.  She could not lift her tongue to see what was going on.  \par She saw her GP on sunday AM.    \par \par She had a swab and magic mouthwash.  \par She hadn't taken any pills since sunday due to her oral sores. \par The mouth wash is kicking in.  \par She is on medrol pack.  She started this 2 days ago.  \par Vaginal sore was one time a couple of months ago.  \par She saw ophthalmologist/retina and glaucoma specialist.  \par \par She got covid 2nd week of september.  \par \par \par

## 2022-10-25 NOTE — ASSESSMENT
[FreeTextEntry1] : 57 year old woman with hx of RA since 2009, hx Vitamin D deficiency, diverticulitis, neuropathy, presents for follow up of her RA. She was previously followed by Dr. Pelletier. Her RA is currently active. Will adjust meds. \par Plan:\par \par RA/Sjogren's\par -Just completing methylprednisolone pack.  \par -on MTX 17.5mg subQ. Patient has difficulty with using vial and syringe technique due to joint pain in her hands. She has no one at home to help her inject the med. Will Rx pen. \par -Stopped plaquenil due to retinal changes. \par -continue orencia infusion. Will give another 6 weeks.  \par - Hep B/C/Quantiferon negative 12/30/21\par -Declined Pneumonia vaccination. Declined flu shot. \par -Most recent VECTRA is 33, slightly higher than last one. VECTRA 2019 was in mild range at 29, routine labs unremarkable, inflammatory markers normal. VECTRA 8/14/18 was 34.\par -Recommend oral hydration and use of rewetting eye drops for secondary sjogrens. Advised compliance with eye drops. She is now doing eye drops more regularly. She takes theratears. No sign dry mouth today.\par \par Mouth sores-\par May be related to herpes.  \par If recurs, try decreasing MTX\par consider trial of colchicine if other systemic features suggestive of Behcet'.  \par \par lateral epicondylitis/tendinopathy/trigger finger r 4th digit\par -rx meloxicam 15mg prn\par -activity modification advised\par -tennis elbow brace advised\par -If trigger finger persistent after activity modification, can inject with CT. \par \par Right buttock pain\par -Rx meloxicam prn\par \par R Knee pain- may be meniscal/soft tissue\par -Follows with Dr. Hayden. Had meloxicam Rx which helped knee. \par -Injected right knee 8/5/21 with 80mg depomedrol\par -Injected left knee with 80mg depomedrol 5/28/20\par -Consider HA injections\par \par Pes planus-\par -ankle pain\par -podiatry previously advised\par \par Right shoulder rotator cuff tendonitis/impingement- not an issue today\par \par neck pain/Low back pain- \par -Rxd tizanidine 2mg QHS PRN, though she never took it due to "medication averse" in general.\par -Had c-spine XR 2/22/18. Has C5-C6 retrolisthesis, no evidence at that time for atlantoaxial instability. +Lordosis reversal related to muscle spasm and/or underlying degenerative change. \par -advised eval with PM\par \par Neuropathy- improved, but fatigue may be related. \par -cont 300mg/600mg thereafter. The fatigue did improve with removing gabapentin in AM. \par consider increasing night time dosing next visit. \par \par Secondary FMS\par Has tender points\par -cont duloxetine 40mg daily\par \par Left ankle pain- not active\par -Advised custom orthotics\par -Change shoes. Feels flats worsen this. \par - MRI left ankle which shows multiple tendinopathy, possible deltoid sprain, and some subtalar effusion/retrocalcaneal bursitis.\par -She has done PT. \par -had seen Dr. Hayden, conservative management advised. \par \par \par Right pes anserine bursitis-not currently active, but was controlled currently with ibuprofen 800mg BID previously.\par -Injected right pes bursa with 80mg depomedrol 4/2018. She found relief with this. Does not want another one at present. \par \par \par Right GTB-improved\par -Injected 80mg depomedrol 4/2/19\par \par \par Vit D deficiency-corrected\par -continue 5000IU daily. \par \par Canker sores\par -If recur, consider reducing MTX\par -Consider trial of colchicine\par \par f/u 6-7 weeks telephonic, to discuss orencia.  \par labs before next visit\par \par . \par  \par

## 2022-11-08 ENCOUNTER — RX RENEWAL (OUTPATIENT)
Age: 57
End: 2022-11-08

## 2022-11-18 ENCOUNTER — APPOINTMENT (OUTPATIENT)
Dept: RHEUMATOLOGY | Facility: CLINIC | Age: 57
End: 2022-11-18
Payer: COMMERCIAL

## 2022-11-18 VITALS
DIASTOLIC BLOOD PRESSURE: 84 MMHG | OXYGEN SATURATION: 97 % | HEART RATE: 55 BPM | SYSTOLIC BLOOD PRESSURE: 132 MMHG | RESPIRATION RATE: 18 BRPM

## 2022-11-18 PROCEDURE — 96413 CHEMO IV INFUSION 1 HR: CPT

## 2022-11-21 RX ORDER — ABATACEPT 250 MG/15ML
250 INJECTION, POWDER, LYOPHILIZED, FOR SOLUTION INTRAVENOUS
Qty: 0 | Refills: 0 | Status: COMPLETED | OUTPATIENT
Start: 2022-11-03

## 2022-11-21 NOTE — HISTORY OF PRESENT ILLNESS
[Denies] : Denies [No] : No [Yes] : Yes [Declined] : Declined [Informed consent documented in EHR.] : Informed consent documented in EHR. [TB] : Tuberculosis screening [Hep acute panel] : Hepatitis acute panel [Left upper extremity] : Left upper extremity [22g] : 22g [Start Time: ___] : Medication Start Time: [unfilled] [End Time: ___] : Medication End Time: [unfilled] [IV discontinued. Intact. No signs or symptoms of IV complications noted. Time: ___] : IV discontinued. Intact. No signs or symptoms of IV complications noted. Time: [unfilled] [Patient  instructed to seek medical attention with signs and symptoms of adverse effects] : Patient  instructed to seek medical attention with signs and symptoms of adverse effects [Patient left unit in no acute distress] : Patient left unit in no acute distress [Medications administered as ordered and tolerated well.] : Medications administered as ordered and tolerated well. [de-identified] : 12:45

## 2022-12-14 LAB
CRP SERPL-MCNC: 4 MG/L
ERYTHROCYTE [SEDIMENTATION RATE] IN BLOOD BY WESTERGREN METHOD: 17 MM/HR
HBV CORE IGG+IGM SER QL: NONREACTIVE
HBV SURFACE AB SERPL IA-ACNC: <3 MIU/ML
HBV SURFACE AG SER QL: NONREACTIVE
HCV AB SER QL: NONREACTIVE
HCV S/CO RATIO: 0.09 S/CO

## 2022-12-15 ENCOUNTER — APPOINTMENT (OUTPATIENT)
Dept: RHEUMATOLOGY | Facility: CLINIC | Age: 57
End: 2022-12-15

## 2022-12-15 PROCEDURE — 99443: CPT

## 2022-12-16 ENCOUNTER — APPOINTMENT (OUTPATIENT)
Dept: RHEUMATOLOGY | Facility: CLINIC | Age: 57
End: 2022-12-16
Payer: COMMERCIAL

## 2022-12-16 VITALS
HEART RATE: 52 BPM | RESPIRATION RATE: 18 BRPM | DIASTOLIC BLOOD PRESSURE: 92 MMHG | SYSTOLIC BLOOD PRESSURE: 150 MMHG | TEMPERATURE: 98 F | OXYGEN SATURATION: 95 %

## 2022-12-16 VITALS
RESPIRATION RATE: 18 BRPM | SYSTOLIC BLOOD PRESSURE: 138 MMHG | DIASTOLIC BLOOD PRESSURE: 84 MMHG | OXYGEN SATURATION: 95 % | TEMPERATURE: 98 F | HEART RATE: 67 BPM

## 2022-12-16 PROCEDURE — 96413 CHEMO IV INFUSION 1 HR: CPT

## 2022-12-16 RX ORDER — ABATACEPT 250 MG/15ML
250 INJECTION, POWDER, LYOPHILIZED, FOR SOLUTION INTRAVENOUS
Qty: 0 | Refills: 0 | Status: COMPLETED | OUTPATIENT
Start: 2022-12-01

## 2022-12-16 NOTE — HISTORY OF PRESENT ILLNESS
[5] : 5 [Denies] : Denies [No] : No [Yes] : Yes [Declined] : Declined [TB] : Tuberculosis screening [Hep acute panel] : Hepatitis acute panel [de-identified] : Elbows, hands [Right upper extremity] : Right upper extremity [24g] : 24g [Start Time: ___] : Medication Start Time: [unfilled] [End Time: ___] : Medication End Time: [unfilled] [IV discontinued. Intact. No signs or symptoms of IV complications noted. Time: ___] : IV discontinued. Intact. No signs or symptoms of IV complications noted. Time: [unfilled] [Patient  instructed to seek medical attention with signs and symptoms of adverse effects] : Patient  instructed to seek medical attention with signs and symptoms of adverse effects [Patient left unit in no acute distress] : Patient left unit in no acute distress [de-identified] : 10:10 am [de-identified] : 2/3/23at 10:00 am

## 2022-12-19 LAB
M TB IFN-G BLD-IMP: NEGATIVE
QUANTIFERON TB PLUS MITOGEN MINUS NIL: >10 IU/ML
QUANTIFERON TB PLUS NIL: 0.04 IU/ML
QUANTIFERON TB PLUS TB1 MINUS NIL: 0 IU/ML
QUANTIFERON TB PLUS TB2 MINUS NIL: 0 IU/ML

## 2023-02-03 ENCOUNTER — APPOINTMENT (OUTPATIENT)
Dept: RHEUMATOLOGY | Facility: CLINIC | Age: 58
End: 2023-02-03
Payer: COMMERCIAL

## 2023-02-03 VITALS
HEART RATE: 60 BPM | SYSTOLIC BLOOD PRESSURE: 145 MMHG | DIASTOLIC BLOOD PRESSURE: 84 MMHG | TEMPERATURE: 96.6 F | OXYGEN SATURATION: 98 % | RESPIRATION RATE: 19 BRPM

## 2023-02-03 VITALS
HEART RATE: 56 BPM | OXYGEN SATURATION: 99 % | SYSTOLIC BLOOD PRESSURE: 143 MMHG | RESPIRATION RATE: 18 BRPM | DIASTOLIC BLOOD PRESSURE: 86 MMHG

## 2023-02-03 PROCEDURE — 96413 CHEMO IV INFUSION 1 HR: CPT

## 2023-02-03 RX ORDER — ABATACEPT 250 MG/15ML
250 INJECTION, POWDER, LYOPHILIZED, FOR SOLUTION INTRAVENOUS
Qty: 0 | Refills: 0 | Status: COMPLETED | OUTPATIENT
Start: 2023-01-26

## 2023-02-03 NOTE — HISTORY OF PRESENT ILLNESS
[8] : 8 [Denies] : Denies [No] : No [Yes] : Yes [Declined] : Declined [Informed consent documented in EHR.] : Informed consent documented in EHR. [TB] : Tuberculosis screening [Hep acute panel] : Hepatitis acute panel [Right upper extremity] : Right upper extremity [24g] : 24g [Start Time: ___] : Medication Start Time: [unfilled] [End Time: ___] : Medication End Time: [unfilled] [IV discontinued. Intact. No signs or symptoms of IV complications noted. Time: ___] : IV discontinued. Intact. No signs or symptoms of IV complications noted. Time: [unfilled] [Patient  instructed to seek medical attention with signs and symptoms of adverse effects] : Patient  instructed to seek medical attention with signs and symptoms of adverse effects [Patient left unit in no acute distress] : Patient left unit in no acute distress [Medications administered as ordered and tolerated well.] : Medications administered as ordered and tolerated well. [de-identified] : Reports pain to right elbow and fingers bilaterally. [de-identified] : 10:00 am

## 2023-02-13 LAB
BASOPHILS # BLD AUTO: 0.06 K/UL
BASOPHILS NFR BLD AUTO: 0.8 %
EOSINOPHIL # BLD AUTO: 0.35 K/UL
EOSINOPHIL NFR BLD AUTO: 4.6 %
HCT VFR BLD CALC: 42.6 %
HGB BLD-MCNC: 13.5 G/DL
IMM GRANULOCYTES NFR BLD AUTO: 0.1 %
LYMPHOCYTES # BLD AUTO: 2.48 K/UL
LYMPHOCYTES NFR BLD AUTO: 32.2 %
MAN DIFF?: NORMAL
MCHC RBC-ENTMCNC: 28.5 PG
MCHC RBC-ENTMCNC: 31.7 GM/DL
MCV RBC AUTO: 90.1 FL
MONOCYTES # BLD AUTO: 0.36 K/UL
MONOCYTES NFR BLD AUTO: 4.7 %
NEUTROPHILS # BLD AUTO: 4.43 K/UL
NEUTROPHILS NFR BLD AUTO: 57.6 %
PLATELET # BLD AUTO: 253 K/UL
RBC # BLD: 4.73 M/UL
RBC # FLD: 15.1 %
WBC # FLD AUTO: 7.69 K/UL

## 2023-02-14 ENCOUNTER — APPOINTMENT (OUTPATIENT)
Dept: RHEUMATOLOGY | Facility: CLINIC | Age: 58
End: 2023-02-14
Payer: COMMERCIAL

## 2023-02-14 VITALS
SYSTOLIC BLOOD PRESSURE: 122 MMHG | TEMPERATURE: 97.1 F | WEIGHT: 186 LBS | HEIGHT: 65 IN | DIASTOLIC BLOOD PRESSURE: 80 MMHG | HEART RATE: 75 BPM | BODY MASS INDEX: 30.99 KG/M2 | OXYGEN SATURATION: 98 %

## 2023-02-14 DIAGNOSIS — M77.02 MEDIAL EPICONDYLITIS, RIGHT ELBOW: ICD-10-CM

## 2023-02-14 DIAGNOSIS — K13.79 OTHER LESIONS OF ORAL MUCOSA: ICD-10-CM

## 2023-02-14 DIAGNOSIS — M77.01 MEDIAL EPICONDYLITIS, RIGHT ELBOW: ICD-10-CM

## 2023-02-14 LAB
ALBUMIN SERPL ELPH-MCNC: 4.4 G/DL
ALP BLD-CCNC: 70 U/L
ALT SERPL-CCNC: 16 U/L
ANION GAP SERPL CALC-SCNC: 12 MMOL/L
AST SERPL-CCNC: 17 U/L
BILIRUB SERPL-MCNC: 0.2 MG/DL
BUN SERPL-MCNC: 30 MG/DL
CALCIUM SERPL-MCNC: 9.9 MG/DL
CHLORIDE SERPL-SCNC: 102 MMOL/L
CO2 SERPL-SCNC: 28 MMOL/L
CREAT SERPL-MCNC: 1.02 MG/DL
CRP SERPL-MCNC: 4 MG/L
EGFR: 64 ML/MIN/1.73M2
ERYTHROCYTE [SEDIMENTATION RATE] IN BLOOD BY WESTERGREN METHOD: 41 MM/HR
GLUCOSE SERPL-MCNC: 89 MG/DL
POTASSIUM SERPL-SCNC: 4.1 MMOL/L
PROT SERPL-MCNC: 6.7 G/DL
SODIUM SERPL-SCNC: 141 MMOL/L

## 2023-02-14 PROCEDURE — 99214 OFFICE O/P EST MOD 30 MIN: CPT

## 2023-02-14 RX ORDER — TRIAMTERENE AND HYDROCHLOROTHIAZIDE 37.5; 25 MG/1; MG/1
37.5-25 CAPSULE ORAL
Refills: 0 | Status: DISCONTINUED | COMMUNITY
End: 2023-02-14

## 2023-02-14 NOTE — ASSESSMENT
[FreeTextEntry1] : \par 57 year old woman with hx of RA since 2009, hx Vitamin D deficiency, diverticulitis, neuropathy, presents for follow up of her RA. She was previously followed by Dr. Pelletier. Her RA is currently more active only because of missing orencia due to travel. \par \par \par Plan:\par \par RA/Sjogren's\par -declining steroid course\par -on MTX 17.5mg subQ. Patient has difficulty with using vial and syringe technique due to joint pain in her hands. She has no one at home to help her inject the med. Will Rx pen. \par -Stopped plaquenil due to retinal changes. \par -continue orencia infusion\par - Hep B/C/Quantiferon negative 12/30/21\par -Declined Pneumonia vaccination. Declined flu shot. \par -Most recent VECTRA is 33, slightly higher than last one. VECTRA 2019 was in mild range at 29, routine labs unremarkable, inflammatory markers normal. VECTRA 8/14/18 was 34.\par -Recommend oral hydration and use of rewetting eye drops for secondary sjogrens. Advised compliance with eye drops. She is now doing eye drops more regularly. She takes theratears. No sign dry mouth today.\par \par Mouth sores-\par May be related to herpes. Today she has lesion at vermillion border of lower lip. \par If recurs, try decreasing MTX\par consider trial of colchicine if other systemic features suggestive of Behcet'. \par \par lateral epicondylitis/tendinopathy/trigger finger r 4th digit\par -rx meloxicam 15mg prn\par -activity modification advised\par -tennis elbow brace advised\par -If trigger finger persistent after activity modification, can inject with CT. \par \par Right buttock pain\par -Rx meloxicam prn\par \par R Knee pain- stable\par -Follows with Dr. Hayden. Had meloxicam Rx which helped knee. \par -Injected right knee 8/5/21 with 80mg depomedrol\par -Injected left knee with 80mg depomedrol 5/28/20\par -Consider HA injections\par \par Pes planus-\par -ankle pain\par -podiatry previously advised\par \par Right shoulder rotator cuff tendonitis/impingement- not an issue today\par \par neck pain/Low back pain- \par -Rxd tizanidine 2mg QHS PRN, though she never took it due to "medication averse" in general.\par -Had c-spine XR 2/22/18. Has C5-C6 retrolisthesis, no evidence at that time for atlantoaxial instability. +Lordosis reversal related to muscle spasm and/or underlying degenerative change. \par -advised eval with PM\par \par Neuropathy- improved, but fatigue may be related. \par -cont 300mg/600mg thereafter. The fatigue did improve with removing gabapentin in AM. \par consider increasing night time dosing next visit. \par \par Secondary FMS\par Has tender points\par -cont duloxetine 40mg daily\par \par Left ankle pain- not active\par -Advised custom orthotics\par -Change shoes. Feels flats worsen this. \par - MRI left ankle which shows multiple tendinopathy, possible deltoid sprain, and some subtalar effusion/retrocalcaneal bursitis.\par -She has done PT. \par -had seen Dr. Hayden, conservative management advised. \par \par \par Right pes anserine bursitis-not currently active, but was controlled currently with ibuprofen 800mg BID previously.\par -Injected right pes bursa with 80mg depomedrol 4/2018. She found relief with this. Does not want another one at present. \par \par \par Right GTB-improved\par -Injected 80mg depomedrol 4/2/19\par \par \par Vit D deficiency-corrected\par -continue 5000IU daily. \par \par \par rpa 3-4 months\par \par  \par  \par

## 2023-02-14 NOTE — HISTORY OF PRESENT ILLNESS
[FreeTextEntry1] : 57 year old woman with hx including RA, vitamin d deficiency, diverticulitis, Right hip replacement 2018, neuropathy, presents for follow up. \par \par Pain level is 5/10 in intensity.\par 45 minutes - 3hours\par \par 2/3 was last infusion. \par \par Her elbows are bad.  \par No fall, no trauma.  She had had mild pain in right elbow before, but it got worse while traveling and left elbow started to worsen as well which was new. \par \par She had a canker sore while eating chestnuts.  \par Then she got sores on tongue and lips. \par Vaginal sore was one time many months ago. \par \par Has not had a medrol [place].  \par \par \par

## 2023-02-22 RX ORDER — ABATACEPT 250 MG/15ML
250 INJECTION, POWDER, LYOPHILIZED, FOR SOLUTION INTRAVENOUS
Qty: 0 | Refills: 0 | Status: COMPLETED | OUTPATIENT
Start: 2022-05-17 | End: 1900-01-01

## 2023-03-03 ENCOUNTER — APPOINTMENT (OUTPATIENT)
Dept: RHEUMATOLOGY | Facility: CLINIC | Age: 58
End: 2023-03-03
Payer: COMMERCIAL

## 2023-03-03 VITALS
SYSTOLIC BLOOD PRESSURE: 140 MMHG | OXYGEN SATURATION: 97 % | RESPIRATION RATE: 18 BRPM | DIASTOLIC BLOOD PRESSURE: 84 MMHG | HEART RATE: 65 BPM

## 2023-03-03 VITALS
RESPIRATION RATE: 18 BRPM | OXYGEN SATURATION: 93 % | TEMPERATURE: 96.5 F | HEART RATE: 70 BPM | SYSTOLIC BLOOD PRESSURE: 119 MMHG | DIASTOLIC BLOOD PRESSURE: 78 MMHG

## 2023-03-03 PROCEDURE — 96413 CHEMO IV INFUSION 1 HR: CPT

## 2023-03-03 RX ORDER — ABATACEPT 250 MG/15ML
250 INJECTION, POWDER, LYOPHILIZED, FOR SOLUTION INTRAVENOUS
Qty: 0 | Refills: 0 | Status: COMPLETED
Start: 2022-05-17

## 2023-03-03 NOTE — HISTORY OF PRESENT ILLNESS
[Denies] : Denies [No] : No [Declined] : Declined [Informed consent documented in EHR.] : Informed consent documented in EHR. [TB] : Tuberculosis screening [Hep acute panel] : Hepatitis acute panel [Right upper extremity] : Right upper extremity [24g] : 24g [Start Time: ___] : Medication Start Time: [unfilled] [End Time: ___] : Medication End Time: [unfilled] [IV discontinued. Intact. No signs or symptoms of IV complications noted. Time: ___] : IV discontinued. Intact. No signs or symptoms of IV complications noted. Time: [unfilled] [Patient  instructed to seek medical attention with signs and symptoms of adverse effects] : Patient  instructed to seek medical attention with signs and symptoms of adverse effects [Patient left unit in no acute distress] : Patient left unit in no acute distress [Medications administered as ordered and tolerated well.] : Medications administered as ordered and tolerated well. [Yes] : Yes [6] : 6 [de-identified] : Pain reported to elbows and knees bilaterally and fingers.  [de-identified] : 10:10 am

## 2023-03-30 ENCOUNTER — APPOINTMENT (OUTPATIENT)
Dept: RHEUMATOLOGY | Facility: CLINIC | Age: 58
End: 2023-03-30
Payer: COMMERCIAL

## 2023-03-30 VITALS
SYSTOLIC BLOOD PRESSURE: 150 MMHG | TEMPERATURE: 96.7 F | HEART RATE: 66 BPM | OXYGEN SATURATION: 97 % | DIASTOLIC BLOOD PRESSURE: 91 MMHG | RESPIRATION RATE: 19 BRPM

## 2023-03-30 VITALS
RESPIRATION RATE: 18 BRPM | HEART RATE: 60 BPM | SYSTOLIC BLOOD PRESSURE: 154 MMHG | DIASTOLIC BLOOD PRESSURE: 85 MMHG | OXYGEN SATURATION: 93 %

## 2023-03-30 PROCEDURE — 96413 CHEMO IV INFUSION 1 HR: CPT

## 2023-03-30 RX ORDER — ABATACEPT 250 MG/15ML
250 INJECTION, POWDER, LYOPHILIZED, FOR SOLUTION INTRAVENOUS
Qty: 0 | Refills: 0 | Status: COMPLETED
Start: 2022-05-17

## 2023-03-30 NOTE — HISTORY OF PRESENT ILLNESS
[Denies] : Denies [No] : No [Yes] : Yes [Declined] : Declined [Informed consent documented in EHR.] : Informed consent documented in EHR. [TB] : Tuberculosis screening [Hep acute panel] : Hepatitis acute panel [Right upper extremity] : Right upper extremity [22g] : 22g [Start Time: ___] : Medication Start Time: [unfilled] [End Time: ___] : Medication End Time: [unfilled] [IV discontinued. Intact. No signs or symptoms of IV complications noted. Time: ___] : IV discontinued. Intact. No signs or symptoms of IV complications noted. Time: [unfilled] [Patient  instructed to seek medical attention with signs and symptoms of adverse effects] : Patient  instructed to seek medical attention with signs and symptoms of adverse effects [Patient left unit in no acute distress] : Patient left unit in no acute distress [Medications administered as ordered and tolerated well.] : Medications administered as ordered and tolerated well. [de-identified] : 10:10 AM

## 2023-04-18 ENCOUNTER — RX RENEWAL (OUTPATIENT)
Age: 58
End: 2023-04-18

## 2023-04-27 ENCOUNTER — APPOINTMENT (OUTPATIENT)
Dept: RHEUMATOLOGY | Facility: CLINIC | Age: 58
End: 2023-04-27
Payer: COMMERCIAL

## 2023-04-27 VITALS
RESPIRATION RATE: 17 BRPM | TEMPERATURE: 98 F | DIASTOLIC BLOOD PRESSURE: 87 MMHG | HEART RATE: 57 BPM | SYSTOLIC BLOOD PRESSURE: 148 MMHG | OXYGEN SATURATION: 98 %

## 2023-04-27 PROCEDURE — 96413 CHEMO IV INFUSION 1 HR: CPT

## 2023-04-27 RX ORDER — ABATACEPT 250 MG/15ML
250 INJECTION, POWDER, LYOPHILIZED, FOR SOLUTION INTRAVENOUS
Qty: 0 | Refills: 0 | Status: COMPLETED
Start: 2022-05-17

## 2023-04-27 NOTE — HISTORY OF PRESENT ILLNESS
[Denies] : Denies [No] : No [Yes] : Yes [Declined] : Declined [TB] : Tuberculosis screening [Hep acute panel] : Hepatitis acute panel [Right upper extremity] : Right upper extremity [24g] : 24g [Start Time: ___] : Medication Start Time: [unfilled] [End Time: ___] : Medication End Time: [unfilled] [IV discontinued. Intact. No signs or symptoms of IV complications noted. Time: ___] : IV discontinued. Intact. No signs or symptoms of IV complications noted. Time: [unfilled] [Patient  instructed to seek medical attention with signs and symptoms of adverse effects] : Patient  instructed to seek medical attention with signs and symptoms of adverse effects [Patient left unit in no acute distress] : Patient left unit in no acute distress [Medications administered as ordered and tolerated well.] : Medications administered as ordered and tolerated well. [de-identified] : 10:52 am [de-identified] : 5/25/23 at 11;00 AM- Next infusion

## 2023-05-25 ENCOUNTER — APPOINTMENT (OUTPATIENT)
Dept: RHEUMATOLOGY | Facility: CLINIC | Age: 58
End: 2023-05-25
Payer: COMMERCIAL

## 2023-05-25 VITALS
DIASTOLIC BLOOD PRESSURE: 83 MMHG | RESPIRATION RATE: 16 BRPM | HEART RATE: 54 BPM | SYSTOLIC BLOOD PRESSURE: 137 MMHG | OXYGEN SATURATION: 94 %

## 2023-05-25 VITALS
DIASTOLIC BLOOD PRESSURE: 88 MMHG | HEART RATE: 58 BPM | OXYGEN SATURATION: 94 % | TEMPERATURE: 96.4 F | SYSTOLIC BLOOD PRESSURE: 139 MMHG | RESPIRATION RATE: 18 BRPM

## 2023-05-25 PROCEDURE — 96413 CHEMO IV INFUSION 1 HR: CPT

## 2023-05-25 RX ORDER — ABATACEPT 250 MG/15ML
250 INJECTION, POWDER, LYOPHILIZED, FOR SOLUTION INTRAVENOUS
Qty: 0 | Refills: 0 | Status: COMPLETED
Start: 2022-05-17

## 2023-05-25 NOTE — HISTORY OF PRESENT ILLNESS
[Denies] : Denies [No] : No [Yes] : Yes [Declined] : Declined [Informed consent documented in EHR.] : Informed consent documented in EHR. [TB] : Tuberculosis screening [Hep acute panel] : Hepatitis acute panel [Right upper extremity] : Right upper extremity [22g] : 22g [Start Time: ___] : Medication Start Time: [unfilled] [End Time: ___] : Medication End Time: [unfilled] [IV discontinued. Intact. No signs or symptoms of IV complications noted. Time: ___] : IV discontinued. Intact. No signs or symptoms of IV complications noted. Time: [unfilled] [Patient  instructed to seek medical attention with signs and symptoms of adverse effects] : Patient  instructed to seek medical attention with signs and symptoms of adverse effects [Patient left unit in no acute distress] : Patient left unit in no acute distress [Medications administered as ordered and tolerated well.] : Medications administered as ordered and tolerated well. [de-identified] : Reports slight soreness to joints 1 week prior to infusions.  [de-identified] : 11:00 am

## 2023-06-13 ENCOUNTER — APPOINTMENT (OUTPATIENT)
Dept: RHEUMATOLOGY | Facility: CLINIC | Age: 58
End: 2023-06-13
Payer: COMMERCIAL

## 2023-06-13 VITALS
SYSTOLIC BLOOD PRESSURE: 149 MMHG | BODY MASS INDEX: 33.49 KG/M2 | HEIGHT: 65 IN | HEART RATE: 65 BPM | TEMPERATURE: 97.6 F | DIASTOLIC BLOOD PRESSURE: 99 MMHG | WEIGHT: 201 LBS | OXYGEN SATURATION: 98 %

## 2023-06-13 DIAGNOSIS — M25.561 PAIN IN RIGHT KNEE: ICD-10-CM

## 2023-06-13 DIAGNOSIS — M16.11 UNILATERAL PRIMARY OSTEOARTHRITIS, RIGHT HIP: ICD-10-CM

## 2023-06-13 DIAGNOSIS — M25.562 PAIN IN RIGHT KNEE: ICD-10-CM

## 2023-06-13 PROCEDURE — 99214 OFFICE O/P EST MOD 30 MIN: CPT

## 2023-06-14 ENCOUNTER — RX RENEWAL (OUTPATIENT)
Age: 58
End: 2023-06-14

## 2023-06-20 ENCOUNTER — APPOINTMENT (OUTPATIENT)
Dept: RHEUMATOLOGY | Facility: CLINIC | Age: 58
End: 2023-06-20
Payer: COMMERCIAL

## 2023-06-20 VITALS
HEART RATE: 80 BPM | OXYGEN SATURATION: 96 % | RESPIRATION RATE: 18 BRPM | SYSTOLIC BLOOD PRESSURE: 121 MMHG | TEMPERATURE: 97.8 F | DIASTOLIC BLOOD PRESSURE: 82 MMHG

## 2023-06-20 VITALS — DIASTOLIC BLOOD PRESSURE: 81 MMHG | SYSTOLIC BLOOD PRESSURE: 125 MMHG | RESPIRATION RATE: 18 BRPM | HEART RATE: 82 BPM

## 2023-06-20 PROCEDURE — 96413 CHEMO IV INFUSION 1 HR: CPT

## 2023-06-20 RX ORDER — ABATACEPT 250 MG/15ML
250 INJECTION, POWDER, LYOPHILIZED, FOR SOLUTION INTRAVENOUS
Qty: 0 | Refills: 0 | Status: COMPLETED
Start: 2022-05-17

## 2023-06-20 NOTE — HISTORY OF PRESENT ILLNESS
[2] : 2 [Denies] : Denies [No] : No [Yes] : Yes [Declined] : Declined [Informed consent documented in EHR.] : Informed consent documented in EHR. [TB] : Tuberculosis screening [Hep acute panel] : Hepatitis acute panel [de-identified] : Reports mild lower back pain. [Right upper extremity] : Right upper extremity [22g] : 22g [Start Time: ___] : Medication Start Time: [unfilled] [End Time: ___] : Medication End Time: [unfilled] [IV discontinued. Intact. No signs or symptoms of IV complications noted. Time: ___] : IV discontinued. Intact. No signs or symptoms of IV complications noted. Time: [unfilled] [Patient  instructed to seek medical attention with signs and symptoms of adverse effects] : Patient  instructed to seek medical attention with signs and symptoms of adverse effects [Patient left unit in no acute distress] : Patient left unit in no acute distress [Medications administered as ordered and tolerated well.] : Medications administered as ordered and tolerated well. [Blood drawn at time of visit] : Blood drawn at time of visit [de-identified] : 03:55 pm

## 2023-06-21 NOTE — HISTORY OF PRESENT ILLNESS
[FreeTextEntry1] : 58 year old woman with hx including RA, vitamin d deficiency, diverticulitis, Right hip replacement 2018, neuropathy, presents for follow up. \par \par Pain level is 5-6/10 in intensity.\par 45 minutes - 3 hours\par \par She does notice her hands and elbows start to act up towards her next infusion, about a week before it.\par \par Worst area are her knees R>L.\par She finds the meloxicam 15mg daily helps this. \par Also when her chiropractor adjusts her, it is very helpful for the knees. \par Has applied Voltaren gel, but finds the 1.6% equivalent from [facility] is better for her knees. \par Her ankles are doing well, no issues.  \par \par No further canker sores.  \par \par No infections or antibiotic use since last visit.

## 2023-06-21 NOTE — ASSESSMENT
Monitor clinically. Last LDL was   Lab Results   Component Value Date    LDLCALC 105.0 05/14/2019      Continue statin     [FreeTextEntry1] : 58 year old woman with hx of RA since 2009, hx Vitamin D deficiency, diverticulitis, neuropathy, presents for follow up of her RA. She was previously followed by Dr. Pelletier. Her RA is overall stable.  \par \par \par Plan:\par \par RA/Sjogren's\par -declining steroid course\par -on MTX 17.5mg subQ. Patient has difficulty with using vial and syringe technique due to joint pain in her hands. She has no one at home to help her inject the med. Will Rx pen. \par -Stopped plaquenil due to retinal changes. \par -continue orencia infusion\par - Hep B/C/Quantiferon negative 12/2022\par -Declined Pneumonia vaccination. Declined flu shot. \par -Most recent VECTRA is 33, slightly higher than last one. VECTRA 2019 was in mild range at 29, routine labs unremarkable, inflammatory markers normal. VECTRA 8/14/18 was 34.\par -Recommend oral hydration and use of rewetting eye drops for secondary sjogrens. Advised compliance with eye drops. She is now doing eye drops more regularly. She takes theratears. No sign dry mouth today.\par \par Mouth sores- resolved and did not return.  \par Likely was related to herpes. She had lesion at vermillion border of lower lip. \par \par \par lateral epicondylitis/tendinopathy/trigger finger r 4th digit- not active today\par -activity modification advised\par -tennis elbow brace advised\par -If trigger finger persistent after activity modification, can inject with CT. \par \par Right buttock pain\par -Rx meloxicam prn\par -Counseled on side effects of NSAIDs. \par \par R Knee pain- active\par -Follows with Dr. Hayden. Had meloxicam Rx which helped knee. \par -Injected right knee 8/5/21 with 80mg depomedrol\par -Injected left knee with 80mg depomedrol 5/28/20\par -Consider HA injections\par \par Pes planus-\par -ankle pain not active now\par -podiatry previously advised\par \par Right shoulder rotator cuff tendonitis/impingement- not an issue today\par \par neck pain/Low back pain- \par -Rxd tizanidine 2mg QHS PRN, though she never took it due to "medication averse" in general.\par -Had c-spine XR 2/22/18. Has C5-C6 retrolisthesis, no evidence at that time for atlantoaxial instability. +Lordosis reversal related to muscle spasm and/or underlying degenerative change. \par -advised eval with PM if recurs/persists. \par \par Neuropathy- improved, but fatigue may be related. \par -cont 300mg/600mg thereafter. The fatigue did improve with removing gabapentin in AM. \par consider increasing night time dosing next visit. \par \par Secondary FMS\par Has tender points\par -cont duloxetine 40mg daily\par \par Left ankle pain- not active\par -Advised custom orthotics\par -Change shoes. Feels flats worsen this. \par - MRI left ankle which showed multiple tendinopathy, possible deltoid sprain, and some subtalar effusion/retrocalcaneal bursitis.\par -She has done PT. \par -had seen Dr. Hayden, conservative management advised. \par \par \par Right pes anserine bursitis-not currently active, but was controlled with ibuprofen 800mg BID previously.\par -Injected right pes bursa with 80mg depomedrol 4/2018. She found relief with this. Does not want another one at present. \par \par \par Right GTB-improved\par -Injected 80mg depomedrol 4/2/19\par \par \par Vit D deficiency-corrected\par -continue 5000IU daily. \par \par NSAID use\par -Reviewed side effects of NSAIDs\par -She will try to use ES Tylenol 1gram BID as needed along with voltaren gel for her knees rather than using meloxicam 15mg every day.  She will try to reserve meloxicam for severe pain.  \par -If knees act up while making this switch, informed her IA knee CS injections may be helpful in this scenario. \par \par rpa 3-4 months, labs now\par \par  \par

## 2023-06-26 LAB
25(OH)D3 SERPL-MCNC: 57 NG/ML
ALBUMIN SERPL ELPH-MCNC: 4.5 G/DL
ALP BLD-CCNC: 69 U/L
ALT SERPL-CCNC: 19 U/L
ANION GAP SERPL CALC-SCNC: 12 MMOL/L
AST SERPL-CCNC: 30 U/L
BILIRUB SERPL-MCNC: 0.3 MG/DL
BUN SERPL-MCNC: 31 MG/DL
CALCIUM SERPL-MCNC: 9.7 MG/DL
CHLORIDE SERPL-SCNC: 105 MMOL/L
CO2 SERPL-SCNC: 26 MMOL/L
CREAT SERPL-MCNC: 1.34 MG/DL
CRP SERPL-MCNC: 6 MG/L
EGFR: 46 ML/MIN/1.73M2
ERYTHROCYTE [SEDIMENTATION RATE] IN BLOOD BY WESTERGREN METHOD: 27 MM/HR
GLUCOSE SERPL-MCNC: 88 MG/DL
POTASSIUM SERPL-SCNC: 4.8 MMOL/L
PROT SERPL-MCNC: 7.2 G/DL
SODIUM SERPL-SCNC: 143 MMOL/L

## 2023-06-28 ENCOUNTER — NON-APPOINTMENT (OUTPATIENT)
Age: 58
End: 2023-06-28

## 2023-07-18 ENCOUNTER — APPOINTMENT (OUTPATIENT)
Dept: RHEUMATOLOGY | Facility: CLINIC | Age: 58
End: 2023-07-18
Payer: COMMERCIAL

## 2023-07-18 VITALS — RESPIRATION RATE: 18 BRPM | SYSTOLIC BLOOD PRESSURE: 143 MMHG | DIASTOLIC BLOOD PRESSURE: 76 MMHG | HEART RATE: 66 BPM

## 2023-07-18 VITALS
SYSTOLIC BLOOD PRESSURE: 131 MMHG | DIASTOLIC BLOOD PRESSURE: 79 MMHG | TEMPERATURE: 96.9 F | OXYGEN SATURATION: 98 % | RESPIRATION RATE: 18 BRPM | HEART RATE: 73 BPM

## 2023-07-18 PROCEDURE — 96413 CHEMO IV INFUSION 1 HR: CPT

## 2023-07-18 RX ORDER — ABATACEPT 250 MG/15ML
250 INJECTION, POWDER, LYOPHILIZED, FOR SOLUTION INTRAVENOUS
Qty: 0 | Refills: 0 | Status: COMPLETED
Start: 2022-05-17

## 2023-07-18 NOTE — HISTORY OF PRESENT ILLNESS
[Denies] : Denies [No] : No [Yes] : Yes [Declined] : Declined [Informed consent documented in EHR.] : Informed consent documented in EHR. [TB] : Tuberculosis screening [Hep acute panel] : Hepatitis acute panel [Right upper extremity] : Right upper extremity [22g] : 22g [Start Time: ___] : Medication Start Time: [unfilled] [End Time: ___] : Medication End Time: [unfilled] [IV discontinued. Intact. No signs or symptoms of IV complications noted. Time: ___] : IV discontinued. Intact. No signs or symptoms of IV complications noted. Time: [unfilled] [Patient  instructed to seek medical attention with signs and symptoms of adverse effects] : Patient  instructed to seek medical attention with signs and symptoms of adverse effects [Patient left unit in no acute distress] : Patient left unit in no acute distress [Medications administered as ordered and tolerated well.] : Medications administered as ordered and tolerated well. [de-identified] : 10:04 AM

## 2023-07-27 ENCOUNTER — RX RENEWAL (OUTPATIENT)
Age: 58
End: 2023-07-27

## 2023-08-14 ENCOUNTER — APPOINTMENT (OUTPATIENT)
Dept: RHEUMATOLOGY | Facility: CLINIC | Age: 58
End: 2023-08-14
Payer: COMMERCIAL

## 2023-08-14 ENCOUNTER — RX RENEWAL (OUTPATIENT)
Age: 58
End: 2023-08-14

## 2023-08-14 VITALS
OXYGEN SATURATION: 94 % | RESPIRATION RATE: 17 BRPM | SYSTOLIC BLOOD PRESSURE: 125 MMHG | TEMPERATURE: 96.9 F | HEART RATE: 67 BPM | DIASTOLIC BLOOD PRESSURE: 82 MMHG

## 2023-08-14 VITALS
RESPIRATION RATE: 17 BRPM | TEMPERATURE: 97.9 F | SYSTOLIC BLOOD PRESSURE: 125 MMHG | OXYGEN SATURATION: 96 % | DIASTOLIC BLOOD PRESSURE: 78 MMHG | HEART RATE: 57 BPM

## 2023-08-14 PROCEDURE — 96413 CHEMO IV INFUSION 1 HR: CPT

## 2023-08-14 RX ORDER — ABATACEPT 250 MG/15ML
250 INJECTION, POWDER, LYOPHILIZED, FOR SOLUTION INTRAVENOUS
Qty: 0 | Refills: 0 | Status: COMPLETED
Start: 2022-05-17

## 2023-08-14 NOTE — HISTORY OF PRESENT ILLNESS
[Denies] : Denies [No] : No [Yes] : Yes [TB] : Tuberculosis screening [Hep acute panel] : Hepatitis acute panel [Right upper extremity] : Right upper extremity [22g] : 22g [Start Time: ___] : Medication Start Time: [unfilled] [de-identified] : right knee chronic, but not at this time, has been 10/10 last few days [End Time: ___] : Medication End Time: [unfilled] [IV discontinued. Intact. No signs or symptoms of IV complications noted. Time: ___] : IV discontinued. Intact. No signs or symptoms of IV complications noted. Time: [unfilled] [Patient  instructed to seek medical attention with signs and symptoms of adverse effects] : Patient  instructed to seek medical attention with signs and symptoms of adverse effects [Patient left unit in no acute distress] : Patient left unit in no acute distress [Medications administered as ordered and tolerated well.] : Medications administered as ordered and tolerated well. [de-identified] : 10:40 am

## 2023-09-11 ENCOUNTER — APPOINTMENT (OUTPATIENT)
Dept: RHEUMATOLOGY | Facility: CLINIC | Age: 58
End: 2023-09-11
Payer: COMMERCIAL

## 2023-09-11 VITALS
HEART RATE: 64 BPM | RESPIRATION RATE: 18 BRPM | DIASTOLIC BLOOD PRESSURE: 78 MMHG | SYSTOLIC BLOOD PRESSURE: 123 MMHG | OXYGEN SATURATION: 99 %

## 2023-09-11 PROCEDURE — 96413 CHEMO IV INFUSION 1 HR: CPT

## 2023-09-11 RX ORDER — ABATACEPT 250 MG/15ML
250 INJECTION, POWDER, LYOPHILIZED, FOR SOLUTION INTRAVENOUS
Qty: 0 | Refills: 0 | Status: COMPLETED
Start: 2022-05-17

## 2023-09-19 ENCOUNTER — APPOINTMENT (OUTPATIENT)
Dept: RHEUMATOLOGY | Facility: CLINIC | Age: 58
End: 2023-09-19
Payer: COMMERCIAL

## 2023-09-19 VITALS
TEMPERATURE: 97.2 F | HEART RATE: 78 BPM | DIASTOLIC BLOOD PRESSURE: 76 MMHG | BODY MASS INDEX: 33.32 KG/M2 | HEIGHT: 65 IN | SYSTOLIC BLOOD PRESSURE: 118 MMHG | WEIGHT: 200 LBS | OXYGEN SATURATION: 97 %

## 2023-09-19 DIAGNOSIS — E55.9 VITAMIN D DEFICIENCY, UNSPECIFIED: ICD-10-CM

## 2023-09-19 PROCEDURE — 20610 DRAIN/INJ JOINT/BURSA W/O US: CPT | Mod: RT

## 2023-09-19 PROCEDURE — 99214 OFFICE O/P EST MOD 30 MIN: CPT | Mod: 25

## 2023-09-19 RX ORDER — TRIAMCINOLONE ACETONIDE 80 MG/ML
80 INJECTION, SUSPENSION INTRA-ARTICULAR; INTRAMUSCULAR
Qty: 8 | Refills: 0 | Status: COMPLETED | OUTPATIENT
Start: 2023-09-19

## 2023-09-19 RX ADMIN — TRIAMCINOLONE ACETONIDE 0 MG/ML: 80 INJECTION, SUSPENSION INTRA-ARTICULAR; INTRAMUSCULAR at 00:00

## 2023-09-28 ENCOUNTER — APPOINTMENT (OUTPATIENT)
Dept: RHEUMATOLOGY | Facility: CLINIC | Age: 58
End: 2023-09-28
Payer: COMMERCIAL

## 2023-09-28 DIAGNOSIS — J01.90 ACUTE SINUSITIS, UNSPECIFIED: ICD-10-CM

## 2023-09-28 PROCEDURE — 99441: CPT

## 2023-10-17 ENCOUNTER — RX RENEWAL (OUTPATIENT)
Age: 58
End: 2023-10-17

## 2023-10-23 ENCOUNTER — APPOINTMENT (OUTPATIENT)
Dept: RHEUMATOLOGY | Facility: CLINIC | Age: 58
End: 2023-10-23
Payer: COMMERCIAL

## 2023-10-23 VITALS
SYSTOLIC BLOOD PRESSURE: 136 MMHG | DIASTOLIC BLOOD PRESSURE: 70 MMHG | HEART RATE: 53 BPM | RESPIRATION RATE: 16 BRPM | OXYGEN SATURATION: 99 %

## 2023-10-23 VITALS
SYSTOLIC BLOOD PRESSURE: 144 MMHG | RESPIRATION RATE: 16 BRPM | HEART RATE: 67 BPM | OXYGEN SATURATION: 99 % | DIASTOLIC BLOOD PRESSURE: 75 MMHG

## 2023-10-23 PROCEDURE — 96413 CHEMO IV INFUSION 1 HR: CPT

## 2023-10-23 RX ORDER — ABATACEPT 250 MG/15ML
250 INJECTION, POWDER, LYOPHILIZED, FOR SOLUTION INTRAVENOUS
Qty: 0 | Refills: 0 | Status: COMPLETED
Start: 2022-05-17

## 2023-10-26 ENCOUNTER — RX RENEWAL (OUTPATIENT)
Age: 58
End: 2023-10-26

## 2023-10-26 RX ORDER — DULOXETINE HYDROCHLORIDE 20 MG/1
20 CAPSULE, DELAYED RELEASE PELLETS ORAL
Qty: 180 | Refills: 0 | Status: ACTIVE | COMMUNITY
Start: 2019-10-30 | End: 1900-01-01

## 2023-11-20 DIAGNOSIS — N89.8 OTHER SPECIFIED NONINFLAMMATORY DISORDERS OF VAGINA: ICD-10-CM

## 2023-12-01 ENCOUNTER — APPOINTMENT (OUTPATIENT)
Dept: RHEUMATOLOGY | Facility: CLINIC | Age: 58
End: 2023-12-01

## 2023-12-01 VITALS
HEART RATE: 63 BPM | DIASTOLIC BLOOD PRESSURE: 83 MMHG | OXYGEN SATURATION: 96 % | SYSTOLIC BLOOD PRESSURE: 131 MMHG | TEMPERATURE: 96.3 F

## 2023-12-01 VITALS — OXYGEN SATURATION: 97 % | DIASTOLIC BLOOD PRESSURE: 88 MMHG | SYSTOLIC BLOOD PRESSURE: 155 MMHG | HEART RATE: 58 BPM

## 2023-12-01 RX ORDER — ABATACEPT 250 MG/15ML
250 INJECTION, POWDER, LYOPHILIZED, FOR SOLUTION INTRAVENOUS
Qty: 0 | Refills: 0 | Status: COMPLETED
Start: 2022-05-17

## 2023-12-05 ENCOUNTER — APPOINTMENT (OUTPATIENT)
Dept: OBGYN | Facility: CLINIC | Age: 58
End: 2023-12-05
Payer: COMMERCIAL

## 2023-12-05 VITALS
SYSTOLIC BLOOD PRESSURE: 117 MMHG | HEART RATE: 77 BPM | BODY MASS INDEX: 31.95 KG/M2 | WEIGHT: 192 LBS | DIASTOLIC BLOOD PRESSURE: 77 MMHG

## 2023-12-05 DIAGNOSIS — R82.90 UNSPECIFIED ABNORMAL FINDINGS IN URINE: ICD-10-CM

## 2023-12-05 DIAGNOSIS — Z00.00 ENCOUNTER FOR GENERAL ADULT MEDICAL EXAMINATION W/OUT ABNORMAL FINDINGS: ICD-10-CM

## 2023-12-05 DIAGNOSIS — M85.80 OTHER SPECIFIED DISORDERS OF BONE DENSITY AND STRUCTURE, UNSPECIFIED SITE: ICD-10-CM

## 2023-12-05 LAB
BILIRUB UR QL STRIP: NORMAL
CLARITY UR: CLEAR
COLLECTION METHOD: NORMAL
GLUCOSE UR-MCNC: NORMAL
HCG UR QL: 0.2 EU/DL
HEMOGLOBIN: 13.9
HGB UR QL STRIP.AUTO: NORMAL
KETONES UR-MCNC: NORMAL
LEUKOCYTE ESTERASE UR QL STRIP: NORMAL
NITRITE UR QL STRIP: NORMAL
PH UR STRIP: 7
PROT UR STRIP-MCNC: NORMAL
SP GR UR STRIP: 1.01

## 2023-12-05 PROCEDURE — 85018 HEMOGLOBIN: CPT | Mod: QW

## 2023-12-05 PROCEDURE — 82270 OCCULT BLOOD FECES: CPT

## 2023-12-05 PROCEDURE — 81003 URINALYSIS AUTO W/O SCOPE: CPT | Mod: QW

## 2023-12-05 PROCEDURE — 99386 PREV VISIT NEW AGE 40-64: CPT | Mod: 25

## 2023-12-07 ENCOUNTER — NON-APPOINTMENT (OUTPATIENT)
Age: 58
End: 2023-12-07

## 2023-12-07 LAB — BACTERIA UR CULT: NORMAL

## 2023-12-09 LAB
A VAGINAE DNA VAG QL NAA+PROBE: NORMAL
BVAB2 DNA VAG QL NAA+PROBE: NORMAL
C KRUSEI DNA VAG QL NAA+PROBE: NEGATIVE
C TRACH RRNA SPEC QL NAA+PROBE: NEGATIVE
CANDIDA DNA VAG QL NAA+PROBE: NEGATIVE
MEGA1 DNA VAG QL NAA+PROBE: NORMAL
N GONORRHOEA RRNA SPEC QL NAA+PROBE: NEGATIVE
T VAGINALIS RRNA SPEC QL NAA+PROBE: POSITIVE

## 2023-12-13 ENCOUNTER — NON-APPOINTMENT (OUTPATIENT)
Age: 58
End: 2023-12-13

## 2023-12-13 LAB — CYTOLOGY CVX/VAG DOC THIN PREP: ABNORMAL

## 2023-12-27 ENCOUNTER — APPOINTMENT (OUTPATIENT)
Dept: OBGYN | Facility: CLINIC | Age: 58
End: 2023-12-27
Payer: COMMERCIAL

## 2023-12-27 ENCOUNTER — ASOB RESULT (OUTPATIENT)
Age: 58
End: 2023-12-27

## 2023-12-27 ENCOUNTER — APPOINTMENT (OUTPATIENT)
Dept: ANTEPARTUM | Facility: CLINIC | Age: 58
End: 2023-12-27
Payer: COMMERCIAL

## 2023-12-27 PROCEDURE — 76856 US EXAM PELVIC COMPLETE: CPT | Mod: 59

## 2023-12-27 PROCEDURE — 99212 OFFICE O/P EST SF 10 MIN: CPT

## 2023-12-27 PROCEDURE — 76830 TRANSVAGINAL US NON-OB: CPT

## 2023-12-27 NOTE — HISTORY OF PRESENT ILLNESS
Problem: Falls - Risk of  Goal: *Absence of Falls  Description: Document Osvaldo Card Fall Risk and appropriate interventions in the flowsheet. Outcome: Progressing Towards Goal  Note: Fall Risk Interventions:            Medication Interventions: Bed/chair exit alarm, Patient to call before getting OOB, Teach patient to arise slowly    Elimination Interventions: Bed/chair exit alarm, Call light in reach              Problem: Patient Education: Go to Patient Education Activity  Goal: Patient/Family Education  Outcome: Progressing Towards Goal     Problem: Pressure Injury - Risk of  Goal: *Prevention of pressure injury  Description: Document Noah Scale and appropriate interventions in the flowsheet.   Outcome: Progressing Towards Goal  Note: Pressure Injury Interventions:  Sensory Interventions: Check visual cues for pain, Float heels, Keep linens dry and wrinkle-free, Minimize linen layers    Moisture Interventions: Apply protective barrier, creams and emollients, Maintain skin hydration (lotion/cream), Minimize layers    Activity Interventions: Increase time out of bed, Pressure redistribution bed/mattress(bed type)    Mobility Interventions: Pressure redistribution bed/mattress (bed type)    Nutrition Interventions: Offer support with meals,snacks and hydration    Friction and Shear Interventions: Minimize layers                Problem: Patient Education: Go to Patient Education Activity  Goal: Patient/Family Education  Outcome: Progressing Towards Goal     Problem: Pain  Goal: *Control of Pain  Outcome: Progressing Towards Goal [FreeTextEntry1] : 58 year old female presents today for follow up for transvaginal sonogram today secondary to PMB. TVS today wnl ET 3.9mm.  Recently treated for trichomonas but partner wasnt. They havent had any sexual encounter since. Advised for partner to get treated.

## 2023-12-27 NOTE — PLAN
[FreeTextEntry1] :  Advised if any spotting & bleeding after 4/2024 please call office for evaluation. I Prakash SANCHEZ am scribing for the presence of Dr. Arroyo the following sections HISTORY OF PRESENT ILLNESS, PAST MEDICAL/FAMILY/SOCIAL HISTORY; REVIEW OF SYSTEMS; VITAL SIGNS; PHYSICAL EXAM; DISPOSITION.       I personally performed the services described in the documentation, reviewed the documentation recorded by the scribe in my presence and it accurately and completely records my words and actions.

## 2024-01-03 ENCOUNTER — RX RENEWAL (OUTPATIENT)
Age: 59
End: 2024-01-03

## 2024-01-03 RX ORDER — MELOXICAM 15 MG/1
15 TABLET ORAL
Qty: 30 | Refills: 1 | Status: ACTIVE | COMMUNITY
Start: 2022-03-07 | End: 1900-01-01

## 2024-01-05 ENCOUNTER — RX RENEWAL (OUTPATIENT)
Age: 59
End: 2024-01-05

## 2024-01-11 ENCOUNTER — APPOINTMENT (OUTPATIENT)
Dept: RHEUMATOLOGY | Facility: CLINIC | Age: 59
End: 2024-01-11
Payer: MEDICAID

## 2024-01-11 VITALS
TEMPERATURE: 97.3 F | HEART RATE: 58 BPM | DIASTOLIC BLOOD PRESSURE: 100 MMHG | HEIGHT: 65 IN | SYSTOLIC BLOOD PRESSURE: 140 MMHG | WEIGHT: 190 LBS | BODY MASS INDEX: 31.65 KG/M2 | OXYGEN SATURATION: 98 %

## 2024-01-11 VITALS
RESPIRATION RATE: 18 BRPM | SYSTOLIC BLOOD PRESSURE: 134 MMHG | TEMPERATURE: 97.1 F | HEART RATE: 57 BPM | DIASTOLIC BLOOD PRESSURE: 86 MMHG | OXYGEN SATURATION: 97 %

## 2024-01-11 LAB
HBV CORE IGG+IGM SER QL: NONREACTIVE
HBV SURFACE AB SERPL IA-ACNC: <3 MIU/ML
HBV SURFACE AG SER QL: NONREACTIVE
HCV AB SER QL: NONREACTIVE
HCV S/CO RATIO: 0.08 S/CO

## 2024-01-11 PROCEDURE — 99214 OFFICE O/P EST MOD 30 MIN: CPT | Mod: 25

## 2024-01-11 PROCEDURE — 96413 CHEMO IV INFUSION 1 HR: CPT

## 2024-01-11 PROCEDURE — ZZZZZ: CPT

## 2024-01-11 RX ORDER — AMOXICILLIN AND CLAVULANATE POTASSIUM 562.5; 437.5; 62.5 MG/1; MG/1; MG/1
1000-62.5 TABLET, MULTILAYER, EXTENDED RELEASE ORAL
Qty: 28 | Refills: 0 | Status: DISCONTINUED | COMMUNITY
Start: 2023-09-28 | End: 2024-01-11

## 2024-01-11 RX ORDER — ABATACEPT 250 MG/15ML
250 INJECTION, POWDER, LYOPHILIZED, FOR SOLUTION INTRAVENOUS
Qty: 0 | Refills: 0 | Status: COMPLETED
Start: 2022-05-17

## 2024-01-11 RX ORDER — METRONIDAZOLE 500 MG/1
500 TABLET ORAL TWICE DAILY
Qty: 14 | Refills: 0 | Status: DISCONTINUED | COMMUNITY
Start: 2023-12-09 | End: 2024-01-11

## 2024-01-11 RX ORDER — FOLIC ACID 1 MG/1
1 TABLET ORAL
Qty: 90 | Refills: 1 | Status: ACTIVE | COMMUNITY
Start: 2017-06-15 | End: 1900-01-01

## 2024-01-11 NOTE — ASSESSMENT
[FreeTextEntry1] : 58 year old woman with hx of RA since 2009, hx Vitamin D deficiency, diverticulitis, neuropathy, presents for follow up of her RA. She was previously followed by Dr. Pelletier. Her RA is overall stable. Her main issue today is R PAB and neuropathy. Plan:  RA/Sjogren's -declining steroid course -on MTX 17.5mg subQ. Patient has difficulty with using vial and syringe technique due to joint pain in her hands. She has no one at home to help her inject the med. Will Rx pen. -Stopped plaquenil due to retinal changes. -continue orencia infusion.  Feels this is going well, though she had delay in her infusion for 1 month for various reasons.  She is scheduled to have it today after this vist.   - Hep B/C negative 1/9/24.  Qunatiferon is still pending.   -Declined Pneumonia vaccination. Declined flu shot. -Most recent VECTRA is 33, slightly higher than last one. VECTRA 2019 was in mild range at 29, routine labs unremarkable, inflammatory markers normal. VECTRA 8/14/18 was 34. -Recommend oral hydration and use of rewetting eye drops for secondary sjogrens. Advised compliance with eye drops. She is now doing eye drops more regularly. She takes theratears. No sign dry mouth today.    Mouth sores- resolved and did not return. Likely was related to herpes. She had lesion at vermillion border of lower lip.  Lateral epicondylitis/tendinopathy/trigger finger r 4th digit- not active today -activity modification advised -tennis elbow brace advised -If trigger finger persistent after activity modification, can inject with CT.    R Knee pain- active -Follows with Dr. Hayden. Had meloxicam Rx which helped knee. -Injected right knee 8/5/21 with 80mg depomedrol -Injected left knee with 80mg depomedrol 5/28/20 -Consider HA injections    Pes planus- -ankle pain not active now -podiatry previously advised    Right shoulder rotator cuff tendonitis/impingement- not an issue today   neck pain/Low back pain- -Rxd tizanidine 2mg QHS PRN, though she never took it due to "medication averse" in general. -Had c-spine XR 2/22/18. Has C5-C6 retrolisthesis, no evidence at that time for atlantoaxial instability. +Lordosis reversal related to muscle spasm and/or underlying degenerative change. -advised eval with PM if recurs/persists.    Neuropathy- improved, but has some breakthrough. -Takes 600mg daily, though it is Rx'd BID.  She sometimes takes the night dose she states.   -Advised neuro eval  Secondary FMS- stable -Had tender points -cont duloxetine 40mg daily    Left ankle pain- not active -Advised custom orthotics -Change shoes. Feels flats worsen this. - MRI left ankle which showed multiple tendinopathy, possible deltoid sprain, and some subtalar effusion/retrocalcaneal bursitis. -She has done PT. -had seen Dr. Hayden, conservative management advised.   Right pes anserine bursitis- mild soreness today, but declining injection as she wants to see if orencia will help this.  -Injected R PAB 9/19/23 with kenalog 80mg -Injected right pes bursa with 80mg depomedrol 4/2018. She found relief with this. Does not want another one at present.   Right GTB-improved -Injected 80mg depomedrol 4/2/19    Vit D deficiency-corrected -continue 5000IU daily.   NSAID use -Reviewed side effects of NSAIDs -She will try to use ES Tylenol 1gram BID as needed along with voltaren gel for her knees rather than using meloxicam 15mg every day. She will try to reserve meloxicam for severe pain.   rpa 3-4 months, labs now     .

## 2024-01-11 NOTE — HISTORY OF PRESENT ILLNESS
[FreeTextEntry1] : 58 year old woman with hx including RA, vitamin d deficiency, diverticulitis, Right hip replacement 2018, neuropathy, presents for follow up.  She has had delay in orencia. Going today to infusion suite, after this.  She had been due 12/1/23.   Had vaginal bleeding, so it was delayed.  She went to GYN- pap smear Had Trichomonas infection, given Flagyl. Blood may have come from thinning of walls of uterus.    Taking gabapentin 300mg daily because of breakthrough tingling. Toes are getting cramps.  R Knee bothers her. She recalls getting a PAB injection to her right knee years ago and this current pain feels similar to the pain she had previously. The pain is located medial right knee, lower than knee cap. It is worsened by negotiating stairs.  Pain level is 5/10 in intensity. AM stiffness:

## 2024-01-11 NOTE — PHYSICAL EXAM
[TextEntry] : Constitutional: Alert and in no acute distress. Eyes: Sclera and conjunctiva were normal, pupils were equal in size, round, and reactive. Extraocular movements were in tact. ENT: Ears, and nose were normal in appearance. The oropharynx was normal. Neck: The appearance of the neck was normal, no neck mass was observed, the thyroid was not enlarged and there were no palpable thyroid nodules. Pulmonary: No respiratory distress and lungs were clear to auscultation b/L. Cardiac: Heart rate was normal and rhythm regular, normal S1 and S2, no gallops, no murmurs, no pericardial rub. Vascular: The pedal pulses are present. There was no peripheral edema. No color changes in the digits. Abd: Normal bowel sounds. Soft, non-tender, non distended, no hepato-splenomegaly. Musculoskeletal: Normal gait, no clubbing or cyanosis of the fingernails, and muscle strength and tone were normal. Puffy appearance to right MCPs with mild tenderness. Minimal TTP right 5th pIP. No warmth b/L knees, POM, +Crepitus b/L knees. +TTP Right PAB. Skin: Normal skin color and pigmentation, normal skin turgor and no rash. No alopecia. Has small lesion at vermillion border of right lower lip. Neuro:No focal deficits. Psychiatric: Oriented to person, place, and time. Insight and judgment were intact and the affect was normal.

## 2024-01-16 LAB
M TB IFN-G BLD-IMP: NEGATIVE
QUANTIFERON TB PLUS MITOGEN MINUS NIL: >10 IU/ML
QUANTIFERON TB PLUS NIL: 0.02 IU/ML
QUANTIFERON TB PLUS TB1 MINUS NIL: 0 IU/ML
QUANTIFERON TB PLUS TB2 MINUS NIL: 0.01 IU/ML

## 2024-01-16 NOTE — HISTORY OF PRESENT ILLNESS
[2] : 2 [N/A] : N/A [Denies] : Denies [No] : No [Yes] : Yes [Declined] : Declined [Informed consent documented in EHR.] : Informed consent documented in EHR. [TB] : Tuberculosis screening [Hep acute panel] : Hepatitis acute panel [Right upper extremity] : Right upper extremity [22g] : 22g [Start Time: ___] : Medication Start Time: [unfilled] [End Time: ___] : Medication End Time: [unfilled] [Medication Name: ___] : Medication Name: [unfilled] [Total Amount Administered: ___] : Total Amount Administered: [unfilled] [IV discontinued. Intact. No signs or symptoms of IV complications noted. Time: ___] : IV discontinued. Intact. No signs or symptoms of IV complications noted. Time: [unfilled] [Patient  instructed to seek medical attention with signs and symptoms of adverse effects] : Patient  instructed to seek medical attention with signs and symptoms of adverse effects [Patient left unit in no acute distress] : Patient left unit in no acute distress [Medications administered as ordered and tolerated well.] : Medications administered as ordered and tolerated well. [de-identified] : generalized [de-identified] : 220 [de-identified] : prescription for blood draw given

## 2024-02-07 ENCOUNTER — APPOINTMENT (OUTPATIENT)
Dept: RHEUMATOLOGY | Facility: CLINIC | Age: 59
End: 2024-02-07
Payer: MEDICAID

## 2024-02-07 VITALS
OXYGEN SATURATION: 98 % | RESPIRATION RATE: 16 BRPM | SYSTOLIC BLOOD PRESSURE: 118 MMHG | DIASTOLIC BLOOD PRESSURE: 80 MMHG | HEART RATE: 65 BPM | TEMPERATURE: 98 F

## 2024-02-07 PROCEDURE — 96413 CHEMO IV INFUSION 1 HR: CPT

## 2024-02-07 RX ORDER — ABATACEPT 250 MG/15ML
250 INJECTION, POWDER, LYOPHILIZED, FOR SOLUTION INTRAVENOUS
Qty: 0 | Refills: 0 | Status: COMPLETED
Start: 2022-05-17

## 2024-02-07 NOTE — HISTORY OF PRESENT ILLNESS
[4] : 4 [Denies] : Denies [No] : No [Yes] : Yes [Declined] : Declined [TB] : Tuberculosis screening [Hep acute panel] : Hepatitis acute panel [de-identified] : right knee [Right upper extremity] : Right upper extremity [24g] : 24g [Start Time: ___] : Medication Start Time: [unfilled] [End Time: ___] : Medication End Time: [unfilled] [Total Amount Administered: ___] : Total Amount Administered: [unfilled] [IV discontinued. Intact. No signs or symptoms of IV complications noted. Time: ___] : IV discontinued. Intact. No signs or symptoms of IV complications noted. Time: [unfilled] [Patient  instructed to seek medical attention with signs and symptoms of adverse effects] : Patient  instructed to seek medical attention with signs and symptoms of adverse effects [Patient left unit in no acute distress] : Patient left unit in no acute distress [Medications administered as ordered and tolerated well.] : Medications administered as ordered and tolerated well. [de-identified] : 11:10 am [de-identified] : 3/12/24 at 1:30 pm next infusion

## 2024-02-16 ENCOUNTER — LABORATORY RESULT (OUTPATIENT)
Age: 59
End: 2024-02-16

## 2024-02-16 ENCOUNTER — APPOINTMENT (OUTPATIENT)
Dept: OBGYN | Facility: CLINIC | Age: 59
End: 2024-02-16
Payer: MEDICAID

## 2024-02-16 DIAGNOSIS — N76.0 ACUTE VAGINITIS: ICD-10-CM

## 2024-02-16 LAB
BILIRUB UR QL STRIP: NORMAL
GLUCOSE UR-MCNC: NORMAL
HCG UR QL: 0.2 EU/DL
HGB UR QL STRIP.AUTO: NORMAL
KETONES UR-MCNC: NORMAL
LEUKOCYTE ESTERASE UR QL STRIP: NORMAL
NITRITE UR QL STRIP: NORMAL
PH UR STRIP: 6
PROT UR STRIP-MCNC: NORMAL
SP GR UR STRIP: 1.02

## 2024-02-16 PROCEDURE — 99459 PELVIC EXAMINATION: CPT

## 2024-02-16 PROCEDURE — 99213 OFFICE O/P EST LOW 20 MIN: CPT

## 2024-02-16 RX ORDER — METRONIDAZOLE 500 MG/1
500 TABLET ORAL TWICE DAILY
Qty: 14 | Refills: 0 | Status: DISCONTINUED | COMMUNITY
Start: 2024-02-16 | End: 2024-02-16

## 2024-02-16 NOTE — PHYSICAL EXAM
[Labia Majora] : labia major [Normal] : clitoris [Labia Minora] : labia minora [Erythema] : erythema [Discharge] : a  ~M vaginal discharge was present [Moderate] : moderate [Green] : green [No Bleeding] : there was no active vaginal bleeding

## 2024-02-18 NOTE — DISCUSSION/SUMMARY
[FreeTextEntry1] : Patient is going to [facility], at this time will tx empirically for yeast infection and trichomonas due to travel  Will treat with diflucan 150 x3 every  3 days and flagyl 500mg bid x7 days  will follow up with patient on results and tx when she comes back if needed. all of her questions were answered she is agreeable with plan     I Simran SANCHEZ am scribing for the presence of Dr. Arroyo the following sections HISTORY OF PRESENT ILLNESS, PAST MEDICAL/FAMILY/SOCIAL HISTORY; REVIEW OF SYSTEMS; VITAL SIGNS; PHYSICAL EXAM; DISPOSITION.    I personally performed the services described in the documentation, reviewed the documentation recorded by the scribe in my presence and it accurately and completely records my words and actions.

## 2024-02-18 NOTE — CHIEF COMPLAINT
[Urgent Visit] : Urgent Visit [FreeTextEntry1] : Patient is a 59 yo female here today for brown discharge. Her and her  were just treated for trichomonas. She states the symptoms are the same from last time she had a TVS and endometrial lining was 3.9mm

## 2024-02-19 DIAGNOSIS — N39.0 URINARY TRACT INFECTION, SITE NOT SPECIFIED: ICD-10-CM

## 2024-02-19 LAB
APPEARANCE: ABNORMAL
BACTERIA UR CULT: ABNORMAL
BILIRUBIN URINE: NEGATIVE
BLOOD URINE: ABNORMAL
COLOR: YELLOW
GLUCOSE QUALITATIVE U: NEGATIVE MG/DL
KETONES URINE: NEGATIVE MG/DL
LEUKOCYTE ESTERASE URINE: ABNORMAL
NITRITE URINE: NEGATIVE
PH URINE: 6
PROTEIN URINE: NORMAL MG/DL
SPECIFIC GRAVITY URINE: 1.02
UROBILINOGEN URINE: 0.2 MG/DL

## 2024-02-21 ENCOUNTER — NON-APPOINTMENT (OUTPATIENT)
Age: 59
End: 2024-02-21

## 2024-02-24 NOTE — DISCHARGE NOTE ADULT - CONDITIONS AT DISCHARGE
abdominal pain Pt. is afebrile and offers no complaints. In no acute distress. Right hip aquacel dressing: clean, dry and intact. Pt is ambulating with a walker, tolerating diet well, and voiding in adequate amounts.

## 2024-03-11 RX ORDER — ABATACEPT 250 MG/15ML
250 INJECTION, POWDER, LYOPHILIZED, FOR SOLUTION INTRAVENOUS
Refills: 0 | Status: ACTIVE | OUTPATIENT
Start: 2024-03-11 | End: 1900-01-01

## 2024-03-12 ENCOUNTER — APPOINTMENT (OUTPATIENT)
Dept: RHEUMATOLOGY | Facility: CLINIC | Age: 59
End: 2024-03-12
Payer: MEDICAID

## 2024-03-12 VITALS
TEMPERATURE: 98 F | RESPIRATION RATE: 17 BRPM | DIASTOLIC BLOOD PRESSURE: 70 MMHG | OXYGEN SATURATION: 98 % | HEART RATE: 61 BPM | SYSTOLIC BLOOD PRESSURE: 116 MMHG

## 2024-03-12 PROCEDURE — 96413 CHEMO IV INFUSION 1 HR: CPT

## 2024-03-12 RX ORDER — ABATACEPT 250 MG/15ML
250 INJECTION, POWDER, LYOPHILIZED, FOR SOLUTION INTRAVENOUS
Qty: 0 | Refills: 0 | Status: COMPLETED
Start: 2024-03-11

## 2024-03-12 NOTE — HISTORY OF PRESENT ILLNESS
[8] : 8 [Denies] : Denies [No] : No [Yes] : Yes [TB] : Tuberculosis screening [Declined] : Declined [Hep acute panel] : Hepatitis acute panel [de-identified] : hands, ankles [24g] : 24g [Right upper extremity] : Right upper extremity [Start Time: ___] : Medication Start Time: [unfilled] [End Time: ___] : Medication End Time: [unfilled] [Total Amount Administered: ___] : Total Amount Administered: [unfilled] [IV discontinued. Intact. No signs or symptoms of IV complications noted. Time: ___] : IV discontinued. Intact. No signs or symptoms of IV complications noted. Time: [unfilled] [Patient left unit in no acute distress] : Patient left unit in no acute distress [Patient  instructed to seek medical attention with signs and symptoms of adverse effects] : Patient  instructed to seek medical attention with signs and symptoms of adverse effects [Medications administered as ordered and tolerated well.] : Medications administered as ordered and tolerated well. [de-identified] : 1:27 pm

## 2024-03-12 NOTE — PHYSICAL THERAPY INITIAL EVALUATION ADULT - ONSET DATE, REHAB EVAL
13-Mar-2018
Patient received semi-santoyo in bed  in NAD on RA, +Telemetry, +Heplock. Cleared by GONZALES Fleming. Agreeable to PT.

## 2024-03-14 LAB
ALBUMIN SERPL ELPH-MCNC: 4.3 G/DL
ALP BLD-CCNC: 63 U/L
ALT SERPL-CCNC: 24 U/L
ANION GAP SERPL CALC-SCNC: 13 MMOL/L
AST SERPL-CCNC: 23 U/L
BASOPHILS # BLD AUTO: 0.07 K/UL
BASOPHILS NFR BLD AUTO: 0.8 %
BILIRUB SERPL-MCNC: 0.3 MG/DL
BUN SERPL-MCNC: 25 MG/DL
CALCIUM SERPL-MCNC: 9.7 MG/DL
CHLORIDE SERPL-SCNC: 101 MMOL/L
CO2 SERPL-SCNC: 26 MMOL/L
CREAT SERPL-MCNC: 1.18 MG/DL
CRP SERPL-MCNC: 3 MG/L
EGFR: 53 ML/MIN/1.73M2
EOSINOPHIL # BLD AUTO: 0.19 K/UL
EOSINOPHIL NFR BLD AUTO: 2.2 %
ERYTHROCYTE [SEDIMENTATION RATE] IN BLOOD BY WESTERGREN METHOD: 21 MM/HR
GLUCOSE SERPL-MCNC: 93 MG/DL
HCT VFR BLD CALC: 41.1 %
HGB BLD-MCNC: 13.1 G/DL
IMM GRANULOCYTES NFR BLD AUTO: 0.1 %
LYMPHOCYTES # BLD AUTO: 2.3 K/UL
LYMPHOCYTES NFR BLD AUTO: 26.7 %
MAN DIFF?: NORMAL
MCHC RBC-ENTMCNC: 28.3 PG
MCHC RBC-ENTMCNC: 31.9 GM/DL
MCV RBC AUTO: 88.8 FL
MONOCYTES # BLD AUTO: 0.47 K/UL
MONOCYTES NFR BLD AUTO: 5.5 %
NEUTROPHILS # BLD AUTO: 5.57 K/UL
NEUTROPHILS NFR BLD AUTO: 64.7 %
PLATELET # BLD AUTO: 241 K/UL
POTASSIUM SERPL-SCNC: 3.9 MMOL/L
PROT SERPL-MCNC: 6.9 G/DL
RBC # BLD: 4.63 M/UL
RBC # FLD: 16.4 %
SODIUM SERPL-SCNC: 140 MMOL/L
WBC # FLD AUTO: 8.61 K/UL

## 2024-03-25 ENCOUNTER — APPOINTMENT (OUTPATIENT)
Dept: PHYSICAL MEDICINE AND REHAB | Facility: CLINIC | Age: 59
End: 2024-03-25
Payer: COMMERCIAL

## 2024-03-25 VITALS
RESPIRATION RATE: 15 BRPM | HEART RATE: 75 BPM | DIASTOLIC BLOOD PRESSURE: 88 MMHG | HEIGHT: 65 IN | SYSTOLIC BLOOD PRESSURE: 130 MMHG | BODY MASS INDEX: 30.82 KG/M2 | WEIGHT: 185 LBS

## 2024-03-25 DIAGNOSIS — M54.12 RADICULOPATHY, CERVICAL REGION: ICD-10-CM

## 2024-03-25 DIAGNOSIS — M54.41 LUMBAGO WITH SCIATICA, RIGHT SIDE: ICD-10-CM

## 2024-03-25 PROCEDURE — 99204 OFFICE O/P NEW MOD 45 MIN: CPT

## 2024-03-25 RX ORDER — AMOXICILLIN 500 MG/1
500 CAPSULE ORAL EVERY 8 HOURS
Qty: 21 | Refills: 0 | Status: DISCONTINUED | COMMUNITY
Start: 2024-02-19 | End: 2024-03-25

## 2024-03-25 RX ORDER — METRONIDAZOLE 500 MG/1
500 TABLET ORAL
Qty: 14 | Refills: 0 | Status: DISCONTINUED | COMMUNITY
Start: 2024-02-16 | End: 2024-03-25

## 2024-03-25 RX ORDER — FLUCONAZOLE 150 MG/1
150 TABLET ORAL
Qty: 3 | Refills: 0 | Status: DISCONTINUED | COMMUNITY
Start: 2024-02-16 | End: 2024-03-25

## 2024-03-25 RX ORDER — ABATACEPT 250 MG/15ML
250 INJECTION, POWDER, LYOPHILIZED, FOR SOLUTION INTRAVENOUS
Qty: 3 | Refills: 0 | Status: DISCONTINUED | COMMUNITY
Start: 2022-01-25 | End: 2024-03-25
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
## 2024-03-25 NOTE — PHYSICAL EXAM
[FreeTextEntry1] : PE: Constitutional: In NAD, calm and cooperative MSK (Neck and Back) 	Inspection: no gross swelling identified 	Palpation: Tenderness of the R>L lower cervical, thoracic and lumbar paraspinals 	ROM: Pain at end cervical extension/flexion and with lumbar extension/flexion 	Strength: 5/5 strength in bilateral upper and lower extremities 	Reflexes: 2+ Biceps/Brachioradialis/Triceps/patella/Achilles reflex bilaterally, Hoffmans/Clonus negative bilaterally 	Sensation: Intact to light touch in bilateral upper and lower extremitie 	Special tests: Spurlings test negative bilaterally, SLR negative bilaterally, CRISSY/FADIR negative bilaterally
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
## 2024-03-27 ENCOUNTER — APPOINTMENT (OUTPATIENT)
Dept: MRI IMAGING | Facility: CLINIC | Age: 59
End: 2024-03-27
Payer: COMMERCIAL

## 2024-03-27 ENCOUNTER — OUTPATIENT (OUTPATIENT)
Dept: OUTPATIENT SERVICES | Facility: HOSPITAL | Age: 59
LOS: 1 days | End: 2024-03-27
Payer: COMMERCIAL

## 2024-03-27 DIAGNOSIS — Z98.890 OTHER SPECIFIED POSTPROCEDURAL STATES: Chronic | ICD-10-CM

## 2024-03-27 DIAGNOSIS — K43.9 VENTRAL HERNIA WITHOUT OBSTRUCTION OR GANGRENE: Chronic | ICD-10-CM

## 2024-03-27 DIAGNOSIS — Z90.49 ACQUIRED ABSENCE OF OTHER SPECIFIED PARTS OF DIGESTIVE TRACT: Chronic | ICD-10-CM

## 2024-03-27 DIAGNOSIS — R93.89 ABNORMAL FINDINGS ON DIAGNOSTIC IMAGING OF OTHER SPECIFIED BODY STRUCTURES: ICD-10-CM

## 2024-03-27 PROCEDURE — 72141 MRI NECK SPINE W/O DYE: CPT | Mod: 26

## 2024-03-27 PROCEDURE — 72141 MRI NECK SPINE W/O DYE: CPT

## 2024-03-27 PROCEDURE — 72146 MRI CHEST SPINE W/O DYE: CPT | Mod: 26

## 2024-03-27 PROCEDURE — 72148 MRI LUMBAR SPINE W/O DYE: CPT | Mod: 26

## 2024-03-27 PROCEDURE — 72146 MRI CHEST SPINE W/O DYE: CPT

## 2024-03-27 PROCEDURE — 72148 MRI LUMBAR SPINE W/O DYE: CPT

## 2024-04-02 ENCOUNTER — NON-APPOINTMENT (OUTPATIENT)
Age: 59
End: 2024-04-02

## 2024-04-02 ENCOUNTER — APPOINTMENT (OUTPATIENT)
Dept: PHYSICAL MEDICINE AND REHAB | Facility: CLINIC | Age: 59
End: 2024-04-02
Payer: COMMERCIAL

## 2024-04-02 DIAGNOSIS — M54.12 RADICULOPATHY, CERVICAL REGION: ICD-10-CM

## 2024-04-02 DIAGNOSIS — M79.18 MYALGIA, OTHER SITE: ICD-10-CM

## 2024-04-02 DIAGNOSIS — M54.6 PAIN IN THORACIC SPINE: ICD-10-CM

## 2024-04-02 DIAGNOSIS — M54.16 RADICULOPATHY, LUMBAR REGION: ICD-10-CM

## 2024-04-02 DIAGNOSIS — M54.2 CERVICALGIA: ICD-10-CM

## 2024-04-02 DIAGNOSIS — G89.29 CERVICALGIA: ICD-10-CM

## 2024-04-02 PROCEDURE — 99214 OFFICE O/P EST MOD 30 MIN: CPT | Mod: 95
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
## 2024-04-09 ENCOUNTER — APPOINTMENT (OUTPATIENT)
Dept: RHEUMATOLOGY | Facility: CLINIC | Age: 59
End: 2024-04-09
Payer: MEDICAID

## 2024-04-09 VITALS
TEMPERATURE: 96.6 F | HEART RATE: 76 BPM | OXYGEN SATURATION: 98 % | RESPIRATION RATE: 18 BRPM | SYSTOLIC BLOOD PRESSURE: 133 MMHG | DIASTOLIC BLOOD PRESSURE: 87 MMHG

## 2024-04-09 PROCEDURE — 96413 CHEMO IV INFUSION 1 HR: CPT

## 2024-04-09 RX ORDER — ABATACEPT 250 MG/15ML
250 INJECTION, POWDER, LYOPHILIZED, FOR SOLUTION INTRAVENOUS
Qty: 0 | Refills: 0 | Status: COMPLETED
Start: 2024-03-11

## 2024-04-09 NOTE — HISTORY OF PRESENT ILLNESS
[Denies] : Denies [No] : No [Yes] : Yes [Declined] : Declined [Informed consent documented in EHR.] : Informed consent documented in EHR. [TB] : Tuberculosis screening [Hep acute panel] : Hepatitis acute panel [Right upper extremity] : Right upper extremity [24g] : 24g [Start Time: ___] : Medication Start Time: [unfilled] [End Time: ___] : Medication End Time: [unfilled] [Medication Name: ___] : Medication Name: [unfilled] [Total Amount Administered: ___] : Total Amount Administered: [unfilled] [IV discontinued. Intact. No signs or symptoms of IV complications noted. Time: ___] : IV discontinued. Intact. No signs or symptoms of IV complications noted. Time: [unfilled] [Patient  instructed to seek medical attention with signs and symptoms of adverse effects] : Patient  instructed to seek medical attention with signs and symptoms of adverse effects [Patient left unit in no acute distress] : Patient left unit in no acute distress [Medications administered as ordered and tolerated well.] : Medications administered as ordered and tolerated well. [de-identified] : 09:35 AM

## 2024-05-08 ENCOUNTER — APPOINTMENT (OUTPATIENT)
Dept: RHEUMATOLOGY | Facility: CLINIC | Age: 59
End: 2024-05-08
Payer: MEDICAID

## 2024-05-08 VITALS
DIASTOLIC BLOOD PRESSURE: 79 MMHG | HEART RATE: 67 BPM | SYSTOLIC BLOOD PRESSURE: 125 MMHG | TEMPERATURE: 98 F | OXYGEN SATURATION: 98 % | RESPIRATION RATE: 16 BRPM

## 2024-05-08 VITALS
HEIGHT: 65 IN | BODY MASS INDEX: 30.82 KG/M2 | OXYGEN SATURATION: 96 % | TEMPERATURE: 97.2 F | SYSTOLIC BLOOD PRESSURE: 142 MMHG | HEART RATE: 71 BPM | DIASTOLIC BLOOD PRESSURE: 88 MMHG | WEIGHT: 185 LBS

## 2024-05-08 DIAGNOSIS — M79.7 FIBROMYALGIA: ICD-10-CM

## 2024-05-08 DIAGNOSIS — M06.9 RHEUMATOID ARTHRITIS, UNSPECIFIED: ICD-10-CM

## 2024-05-08 DIAGNOSIS — M70.50 OTHER BURSITIS OF KNEE, UNSPECIFIED KNEE: ICD-10-CM

## 2024-05-08 DIAGNOSIS — M35.00 SICCA SYNDROME, UNSPECIFIED: ICD-10-CM

## 2024-05-08 PROCEDURE — 96374 THER/PROPH/DIAG INJ IV PUSH: CPT | Mod: 59

## 2024-05-08 PROCEDURE — 99214 OFFICE O/P EST MOD 30 MIN: CPT | Mod: 25

## 2024-05-08 PROCEDURE — ZZZZZ: CPT

## 2024-05-08 PROCEDURE — 96413 CHEMO IV INFUSION 1 HR: CPT

## 2024-05-08 RX ORDER — OMEPRAZOLE 20 MG/1
20 CAPSULE, DELAYED RELEASE ORAL
Refills: 0 | Status: ACTIVE | COMMUNITY

## 2024-05-08 RX ORDER — ABATACEPT 250 MG/15ML
250 INJECTION, POWDER, LYOPHILIZED, FOR SOLUTION INTRAVENOUS
Qty: 0 | Refills: 0 | Status: COMPLETED
Start: 2024-03-11

## 2024-05-08 RX ORDER — METHOTREXATE 20 MG/.4ML
20 INJECTION, SOLUTION SUBCUTANEOUS
Qty: 4 | Refills: 2 | Status: ACTIVE | COMMUNITY
Start: 2021-08-05 | End: 1900-01-01

## 2024-05-08 RX ORDER — METHOTREXATE 25 MG/ML
INJECTION, SOLUTION INTRA-ARTERIAL; INTRAMUSCULAR; INTRATHECAL; INTRAVENOUS
Refills: 0 | Status: ACTIVE | COMMUNITY

## 2024-05-08 RX ADMIN — METHYLPREDNISOLONE 0 MG: 40 INJECTION, POWDER, LYOPHILIZED, FOR SOLUTION INTRAMUSCULAR; INTRAVENOUS at 00:00

## 2024-05-09 RX ORDER — METHYLPREDNISOLONE 40 MG/ML
40 INJECTION, POWDER, LYOPHILIZED, FOR SOLUTION INTRAMUSCULAR; INTRAVENOUS
Qty: 0 | Refills: 0 | Status: COMPLETED | OUTPATIENT
Start: 2024-05-08

## 2024-05-09 NOTE — HISTORY OF PRESENT ILLNESS
[8] : 8 [Denies] : Denies [No] : No [Yes] : Yes [Declined] : Declined [Total Amount Administered: ___] : Total Amount Administered: [unfilled] [IV discontinued. Intact. No signs or symptoms of IV complications noted. Time: ___] : IV discontinued. Intact. No signs or symptoms of IV complications noted. Time: [unfilled] [Patient  instructed to seek medical attention with signs and symptoms of adverse effects] : Patient  instructed to seek medical attention with signs and symptoms of adverse effects [Patient left unit in no acute distress] : Patient left unit in no acute distress [Medications administered as ordered and tolerated well.] : Medications administered as ordered and tolerated well. [de-identified] : joints [de-identified] : script for blood work given to patient [Start Time: ___] : Start Time: [unfilled] [End Time: ___] : End Time: [unfilled] [Medication Name: ___] : Medication Name: [unfilled]

## 2024-06-04 ENCOUNTER — APPOINTMENT (OUTPATIENT)
Dept: RHEUMATOLOGY | Facility: CLINIC | Age: 59
End: 2024-06-04
Payer: MEDICAID

## 2024-06-04 VITALS
OXYGEN SATURATION: 98 % | RESPIRATION RATE: 17 BRPM | DIASTOLIC BLOOD PRESSURE: 89 MMHG | SYSTOLIC BLOOD PRESSURE: 149 MMHG | TEMPERATURE: 98 F | HEART RATE: 57 BPM

## 2024-06-04 VITALS
DIASTOLIC BLOOD PRESSURE: 84 MMHG | SYSTOLIC BLOOD PRESSURE: 138 MMHG | OXYGEN SATURATION: 98 % | TEMPERATURE: 98 F | RESPIRATION RATE: 17 BRPM | HEART RATE: 62 BPM

## 2024-06-04 PROCEDURE — 96413 CHEMO IV INFUSION 1 HR: CPT

## 2024-06-04 RX ORDER — ABATACEPT 250 MG/15ML
250 INJECTION, POWDER, LYOPHILIZED, FOR SOLUTION INTRAVENOUS
Qty: 0 | Refills: 0 | Status: COMPLETED
Start: 2024-03-11

## 2024-06-04 RX ORDER — GABAPENTIN 300 MG/1
300 CAPSULE ORAL
Qty: 120 | Refills: 2 | Status: ACTIVE | COMMUNITY
Start: 2017-02-03 | End: 1900-01-01

## 2024-06-04 NOTE — HISTORY OF PRESENT ILLNESS
[6] : 6 [Denies] : Denies [No] : No [Yes] : Yes [Declined] : Declined [TB] : Tuberculosis screening [Hep acute panel] : Hepatitis acute panel [de-identified] : all the joints [Right upper extremity] : Right upper extremity [24g] : 24g [Start Time: ___] : Medication Start Time: [unfilled] [End Time: ___] : Medication End Time: [unfilled] [Medication Name: ___] : Medication Name: [unfilled] [Total Amount Administered: ___] : Total Amount Administered: [unfilled] [IV discontinued. Intact. No signs or symptoms of IV complications noted. Time: ___] : IV discontinued. Intact. No signs or symptoms of IV complications noted. Time: [unfilled] [Patient  instructed to seek medical attention with signs and symptoms of adverse effects] : Patient  instructed to seek medical attention with signs and symptoms of adverse effects [Patient left unit in no acute distress] : Patient left unit in no acute distress [Medications administered as ordered and tolerated well.] : Medications administered as ordered and tolerated well. [de-identified] : 11:15 am [de-identified] : script for bloodwork given to patient.

## 2024-06-06 LAB
ALBUMIN SERPL ELPH-MCNC: 4.5 G/DL
ALP BLD-CCNC: 70 U/L
ALT SERPL-CCNC: 23 U/L
ANION GAP SERPL CALC-SCNC: 12 MMOL/L
AST SERPL-CCNC: 25 U/L
BASOPHILS # BLD AUTO: 0.03 K/UL
BASOPHILS NFR BLD AUTO: 0.5 %
BILIRUB SERPL-MCNC: 0.4 MG/DL
BUN SERPL-MCNC: 23 MG/DL
CALCIUM SERPL-MCNC: 9.5 MG/DL
CHLORIDE SERPL-SCNC: 102 MMOL/L
CO2 SERPL-SCNC: 26 MMOL/L
CREAT SERPL-MCNC: 0.97 MG/DL
CRP SERPL-MCNC: 4 MG/L
EGFR: 67 ML/MIN/1.73M2
EOSINOPHIL # BLD AUTO: 0.21 K/UL
EOSINOPHIL NFR BLD AUTO: 3.4 %
ERYTHROCYTE [SEDIMENTATION RATE] IN BLOOD BY WESTERGREN METHOD: 11 MM/HR
GLUCOSE SERPL-MCNC: 88 MG/DL
HCT VFR BLD CALC: 43.9 %
HGB BLD-MCNC: 13.6 G/DL
IMM GRANULOCYTES NFR BLD AUTO: 0.3 %
LYMPHOCYTES # BLD AUTO: 2.1 K/UL
LYMPHOCYTES NFR BLD AUTO: 33.7 %
MAN DIFF?: NORMAL
MCHC RBC-ENTMCNC: 28.6 PG
MCHC RBC-ENTMCNC: 31 GM/DL
MCV RBC AUTO: 92.2 FL
MONOCYTES # BLD AUTO: 0.39 K/UL
MONOCYTES NFR BLD AUTO: 6.3 %
NEUTROPHILS # BLD AUTO: 3.48 K/UL
NEUTROPHILS NFR BLD AUTO: 55.8 %
PLATELET # BLD AUTO: 234 K/UL
POTASSIUM SERPL-SCNC: 4.4 MMOL/L
PROT SERPL-MCNC: 7 G/DL
RBC # BLD: 4.76 M/UL
RBC # FLD: 18.3 %
SODIUM SERPL-SCNC: 140 MMOL/L
WBC # FLD AUTO: 6.23 K/UL

## 2024-06-20 ENCOUNTER — APPOINTMENT (OUTPATIENT)
Dept: ORTHOPEDIC SURGERY | Facility: CLINIC | Age: 59
End: 2024-06-20

## 2024-06-20 VITALS
DIASTOLIC BLOOD PRESSURE: 64 MMHG | HEIGHT: 65 IN | BODY MASS INDEX: 30.82 KG/M2 | SYSTOLIC BLOOD PRESSURE: 126 MMHG | WEIGHT: 185 LBS | HEART RATE: 35 BPM

## 2024-06-20 DIAGNOSIS — M25.532 PAIN IN RIGHT WRIST: ICD-10-CM

## 2024-06-20 DIAGNOSIS — M19.049 PRIMARY OSTEOARTHRITIS, UNSPECIFIED HAND: ICD-10-CM

## 2024-06-20 DIAGNOSIS — M79.641 PAIN IN RIGHT HAND: ICD-10-CM

## 2024-06-20 DIAGNOSIS — M65.4 RADIAL STYLOID TENOSYNOVITIS [DE QUERVAIN]: ICD-10-CM

## 2024-06-20 DIAGNOSIS — M25.531 PAIN IN RIGHT WRIST: ICD-10-CM

## 2024-06-20 PROCEDURE — 20550 NJX 1 TENDON SHEATH/LIGAMENT: CPT | Mod: 50,59

## 2024-06-20 PROCEDURE — 99215 OFFICE O/P EST HI 40 MIN: CPT | Mod: 25

## 2024-06-20 PROCEDURE — 73110 X-RAY EXAM OF WRIST: CPT | Mod: LT,RT

## 2024-06-20 PROCEDURE — 20600 DRAIN/INJ JOINT/BURSA W/O US: CPT | Mod: 50,59

## 2024-06-20 NOTE — PHYSICAL EXAM
[de-identified] : Examination at the level of the [bilateral] wrist reveals tenderness at the level of the first dorsal compartment with a positive finkelstein. Examination of the [bilateral] thumb basal joint reveals pain with compression of the CMC joint with a positive grind test for pain [de-identified] : [4] views of [bilateral hands and wrists] were obtained today in my office and were seen by me and discussed with the patient.  These [show findings consistent with bilateral basal joint OA and findings of IP joint OA]

## 2024-06-20 NOTE — HISTORY OF PRESENT ILLNESS
[FreeTextEntry1] : Maxine is a 59-year-old female who presents today with a chronic multiple year history of bilateral wrist and thumb discomfort difficulty with pinching  and daily activities.

## 2024-06-20 NOTE — ASSESSMENT
[FreeTextEntry1] : ASSESSMENT: The patient comes in today with chronic exacerbated complaints of basal joint arthropathy bilaterally and tendinosis of the wrists.  With this in mind I do recommend bracing and she does elect for injections.   The patient was adequately and thoroughly informed of my assessment of their current condition(s).  - This may diminish bodily function for the extremity. We discussed prognosis, tx modalities including operative and nonoperative options for the above diagnostic assessment. As always, 2nd opinion is always provided as an option.  When accessible, I was able to review other physicians note(s) including reviewing other tests, imaging results as well as personally view these results for my own interpretation.   Injection:   The risks and benefits of a steroid injection were discussed in detail. The risks include but are not limited to: pain, infection, swelling, flare response, bleeding, subcutaneous fat atrophy, skin depigmentation and/or elevation of blood sugar. The risk of incomplete resolution of symptoms, recurrence and additional intervention was reviewed and considered by the patient. The patient agreed to proceed and under a sterile prep, I injected 1 unit 6mg into 1 cc of a combination of Celestone and Lidocaine into the bilateral basal joint, bilateral de Quervain's. The patient tolerated the injection well.  The patient was adequately and thoroughly informed of my assessment of their current condition(s).  DISCUSSION: 1.  Injections as above.  Bracing and activity modification 2. [x] 3. [x]

## 2024-07-02 ENCOUNTER — APPOINTMENT (OUTPATIENT)
Dept: RHEUMATOLOGY | Facility: CLINIC | Age: 59
End: 2024-07-02
Payer: MEDICAID

## 2024-07-02 VITALS
DIASTOLIC BLOOD PRESSURE: 69 MMHG | RESPIRATION RATE: 15 BRPM | HEART RATE: 87 BPM | TEMPERATURE: 98 F | OXYGEN SATURATION: 98 % | SYSTOLIC BLOOD PRESSURE: 92 MMHG

## 2024-07-02 VITALS
HEART RATE: 69 BPM | SYSTOLIC BLOOD PRESSURE: 121 MMHG | DIASTOLIC BLOOD PRESSURE: 87 MMHG | OXYGEN SATURATION: 98 % | RESPIRATION RATE: 17 BRPM | TEMPERATURE: 98 F

## 2024-07-02 DIAGNOSIS — M17.11 UNILATERAL PRIMARY OSTEOARTHRITIS, RIGHT KNEE: ICD-10-CM

## 2024-07-02 PROCEDURE — 96413 CHEMO IV INFUSION 1 HR: CPT

## 2024-07-02 PROCEDURE — 20610 DRAIN/INJ JOINT/BURSA W/O US: CPT | Mod: RT

## 2024-07-02 PROCEDURE — ZZZZZ: CPT

## 2024-07-02 RX ORDER — TRIAMCINOLONE ACETONIDE 80 MG/ML
80 INJECTION, SUSPENSION INTRA-ARTICULAR; INTRAMUSCULAR
Qty: 8 | Refills: 0 | Status: COMPLETED | OUTPATIENT
Start: 2024-07-02

## 2024-07-02 RX ORDER — ABATACEPT 250 MG/15ML
250 INJECTION, POWDER, LYOPHILIZED, FOR SOLUTION INTRAVENOUS
Qty: 0 | Refills: 0 | Status: COMPLETED
Start: 2024-03-11

## 2024-07-02 RX ADMIN — TRIAMCINOLONE ACETONIDE 0 MG/ML: 80 INJECTION, SUSPENSION INTRA-ARTICULAR; INTRAMUSCULAR at 00:00

## 2024-07-30 ENCOUNTER — APPOINTMENT (OUTPATIENT)
Dept: RHEUMATOLOGY | Facility: CLINIC | Age: 59
End: 2024-07-30
Payer: MEDICAID

## 2024-07-30 VITALS
TEMPERATURE: 99 F | DIASTOLIC BLOOD PRESSURE: 73 MMHG | OXYGEN SATURATION: 99 % | RESPIRATION RATE: 17 BRPM | SYSTOLIC BLOOD PRESSURE: 111 MMHG | HEART RATE: 62 BPM

## 2024-07-30 PROCEDURE — 96413 CHEMO IV INFUSION 1 HR: CPT

## 2024-07-30 RX ORDER — ABATACEPT 250 MG/15ML
250 INJECTION, POWDER, LYOPHILIZED, FOR SOLUTION INTRAVENOUS
Qty: 0 | Refills: 0 | Status: COMPLETED
Start: 2024-03-11

## 2024-07-30 NOTE — HISTORY OF PRESENT ILLNESS
[3] : 3 [N/A] : N/A [Denies] : Denies [No] : No [Yes] : Yes [Declined] : Declined [TB] : Tuberculosis screening [Hep acute panel] : Hepatitis acute panel [de-identified] : lower back [Right upper extremity] : Right upper extremity [24g] : 24g [Start Time: ___] : Medication Start Time: [unfilled] [End Time: ___] : Medication End Time: [unfilled] [Medication Name: ___] : Medication Name: [unfilled] [Total Amount Administered: ___] : Total Amount Administered: [unfilled] [IV discontinued. Intact. No signs or symptoms of IV complications noted. Time: ___] : IV discontinued. Intact. No signs or symptoms of IV complications noted. Time: [unfilled] [Patient  instructed to seek medical attention with signs and symptoms of adverse effects] : Patient  instructed to seek medical attention with signs and symptoms of adverse effects [Patient left unit in no acute distress] : Patient left unit in no acute distress [Medications administered as ordered and tolerated well.] : Medications administered as ordered and tolerated well. [de-identified] : 12:45 pm

## 2024-08-20 ENCOUNTER — RX RENEWAL (OUTPATIENT)
Age: 59
End: 2024-08-20

## 2024-08-20 NOTE — ASU PATIENT PROFILE, ADULT - PATIENT'S HEIGHT AND WEIGHT RECORDED IN THE VITAL SIGNS FLOWSHEET
Subjective   Patient ID: Bradford Foster is a 74 y.o. female who presents for Hypertension (Patient is here for BP follow up. ).    She is adamant that she has been taking her amlodipine BID for a few years now. Everything about her chart and her refills indicates this cannot be accurate.  I even called the pharmacy to review past 2 Rx fills for this medication.     She also needs a RF on the xanax she uses for insomnia.    OARRS reviewed    Histories reviewed      Objective   /80   Pulse 63   Wt 72.6 kg (160 lb)   LMP  (LMP Unknown)   SpO2 97%   BMI 27.04 kg/m²    Physical Exam      Problem List Items Addressed This Visit             ICD-10-CM    Essential hypertension - Primary I10    History of liver transplant (Multi) Z94.4    Psychophysiological insomnia F51.04    Relevant Medications    ALPRAZolam (Xanax) 1 mg tablet     She will call to let me know about how many norvasc she has at home.     on preop checklist/yes

## 2024-08-27 ENCOUNTER — APPOINTMENT (OUTPATIENT)
Dept: RHEUMATOLOGY | Facility: CLINIC | Age: 59
End: 2024-08-27
Payer: MEDICAID

## 2024-08-27 VITALS
TEMPERATURE: 98 F | DIASTOLIC BLOOD PRESSURE: 83 MMHG | OXYGEN SATURATION: 98 % | SYSTOLIC BLOOD PRESSURE: 141 MMHG | RESPIRATION RATE: 17 BRPM | HEART RATE: 68 BPM

## 2024-08-27 PROCEDURE — 96413 CHEMO IV INFUSION 1 HR: CPT

## 2024-08-27 RX ORDER — ABATACEPT 250 MG/15ML
250 INJECTION, POWDER, LYOPHILIZED, FOR SOLUTION INTRAVENOUS
Qty: 0 | Refills: 0 | Status: COMPLETED
Start: 2024-03-11

## 2024-09-10 ENCOUNTER — APPOINTMENT (OUTPATIENT)
Dept: RHEUMATOLOGY | Facility: CLINIC | Age: 59
End: 2024-09-10
Payer: MEDICAID

## 2024-09-10 VITALS
HEIGHT: 65 IN | DIASTOLIC BLOOD PRESSURE: 86 MMHG | BODY MASS INDEX: 33.99 KG/M2 | TEMPERATURE: 96.8 F | OXYGEN SATURATION: 99 % | SYSTOLIC BLOOD PRESSURE: 128 MMHG | HEART RATE: 74 BPM | WEIGHT: 204 LBS

## 2024-09-10 DIAGNOSIS — M06.9 RHEUMATOID ARTHRITIS, UNSPECIFIED: ICD-10-CM

## 2024-09-10 DIAGNOSIS — M35.00 SICCA SYNDROME, UNSPECIFIED: ICD-10-CM

## 2024-09-10 DIAGNOSIS — M25.561 PAIN IN RIGHT KNEE: ICD-10-CM

## 2024-09-10 DIAGNOSIS — E55.9 VITAMIN D DEFICIENCY, UNSPECIFIED: ICD-10-CM

## 2024-09-10 DIAGNOSIS — M79.7 FIBROMYALGIA: ICD-10-CM

## 2024-09-10 PROCEDURE — 99214 OFFICE O/P EST MOD 30 MIN: CPT

## 2024-09-10 RX ORDER — NAPROXEN 500 MG/1
500 TABLET ORAL
Qty: 30 | Refills: 0 | Status: ACTIVE | COMMUNITY
Start: 2024-09-10 | End: 1900-01-01

## 2024-09-11 RX ORDER — MULTIVIT-MIN/IRON/FOLIC ACID/K 18-600-40
CAPSULE ORAL
Refills: 0 | Status: ACTIVE | COMMUNITY

## 2024-09-11 RX ORDER — PNV NO.95/FERROUS FUM/FOLIC AC 28MG-0.8MG
TABLET ORAL
Refills: 0 | Status: ACTIVE | COMMUNITY

## 2024-09-19 ENCOUNTER — APPOINTMENT (OUTPATIENT)
Dept: OBGYN | Facility: CLINIC | Age: 59
End: 2024-09-19
Payer: MEDICAID

## 2024-09-19 DIAGNOSIS — R31.9 HEMATURIA, UNSPECIFIED: ICD-10-CM

## 2024-09-19 DIAGNOSIS — Z86.19 PERSONAL HISTORY OF OTHER INFECTIOUS AND PARASITIC DISEASES: ICD-10-CM

## 2024-09-19 DIAGNOSIS — N95.0 POSTMENOPAUSAL BLEEDING: ICD-10-CM

## 2024-09-19 DIAGNOSIS — N89.8 OTHER SPECIFIED NONINFLAMMATORY DISORDERS OF VAGINA: ICD-10-CM

## 2024-09-19 DIAGNOSIS — R10.2 PELVIC AND PERINEAL PAIN: ICD-10-CM

## 2024-09-19 DIAGNOSIS — Z00.00 ENCOUNTER FOR GENERAL ADULT MEDICAL EXAMINATION W/OUT ABNORMAL FINDINGS: ICD-10-CM

## 2024-09-19 LAB
BILIRUB UR QL STRIP: NEGATIVE
CLARITY UR: CLEAR
COLLECTION METHOD: NORMAL
GLUCOSE UR-MCNC: NEGATIVE
HCG UR QL: 0 EU/DL
HGB UR QL STRIP.AUTO: NEGATIVE
KETONES UR-MCNC: NEGATIVE
LEUKOCYTE ESTERASE UR QL STRIP: NORMAL
NITRITE UR QL STRIP: NEGATIVE
PH UR STRIP: 6
PROT UR STRIP-MCNC: NEGATIVE
SP GR UR STRIP: 1

## 2024-09-19 PROCEDURE — 99213 OFFICE O/P EST LOW 20 MIN: CPT | Mod: 25

## 2024-09-19 PROCEDURE — 81003 URINALYSIS AUTO W/O SCOPE: CPT | Mod: QW

## 2024-09-19 NOTE — PHYSICAL EXAM
[Vulvar Atrophy] : vulvar atrophy [Normal] : uterus [Discharge] : a  ~M vaginal discharge was present [Clear] : clear [Frothy] : frothy [No Bleeding] : there was no active vaginal bleeding [Uterine Adnexae] : were not tender and not enlarged

## 2024-09-19 NOTE — CHIEF COMPLAINT
[Urgent Visit] : Urgent Visit [FreeTextEntry1] : 59 year old female c/o vaginal discharge, pelvic pain and PMB. she first noticed the vaginal discharge after intercourse. the PMB she noticed on Monday after she used the bathroom she noticed blood on the toilet paper. the pelvic pain she describes as menstrual cramps. she has hx of recurrent trich; last treated in Feb. her and her  were both treated.

## 2024-09-19 NOTE — DISCUSSION/SUMMARY
[FreeTextEntry1] : vaginitis plus obtained- will treat accordingly. advised patient if it is positive for trich again will plan to treat her and her  with a 7 day course. her  was treated with one dose in feb.  plan TVS to evaluate EMB. her lining was 3.9 in feb. if her lining is the same or thicker will plan EMB.  all questions answered, pt agreeable with plan.

## 2024-09-21 LAB
25(OH)D3 SERPL-MCNC: 54 NG/ML
ALBUMIN SERPL ELPH-MCNC: 4.4 G/DL
ALP BLD-CCNC: 57 U/L
ALT SERPL-CCNC: 25 U/L
ANION GAP SERPL CALC-SCNC: 10 MMOL/L
AST SERPL-CCNC: 30 U/L
BASOPHILS # BLD AUTO: 0.04 K/UL
BASOPHILS NFR BLD AUTO: 0.6 %
BILIRUB SERPL-MCNC: 0.3 MG/DL
BUN SERPL-MCNC: 20 MG/DL
CALCIUM SERPL-MCNC: 9.6 MG/DL
CHLORIDE SERPL-SCNC: 102 MMOL/L
CO2 SERPL-SCNC: 27 MMOL/L
CREAT SERPL-MCNC: 1.12 MG/DL
CRP SERPL-MCNC: 3 MG/L
EGFR: 56 ML/MIN/1.73M2
EOSINOPHIL # BLD AUTO: 0.21 K/UL
EOSINOPHIL NFR BLD AUTO: 3.3 %
ERYTHROCYTE [SEDIMENTATION RATE] IN BLOOD BY WESTERGREN METHOD: 18 MM/HR
GLUCOSE SERPL-MCNC: 85 MG/DL
HCT VFR BLD CALC: 41.3 %
HGB BLD-MCNC: 13.1 G/DL
IMM GRANULOCYTES NFR BLD AUTO: 0.2 %
LYMPHOCYTES # BLD AUTO: 1.92 K/UL
LYMPHOCYTES NFR BLD AUTO: 30.1 %
MAN DIFF?: NORMAL
MCHC RBC-ENTMCNC: 29.6 PG
MCHC RBC-ENTMCNC: 31.7 GM/DL
MCV RBC AUTO: 93.4 FL
MONOCYTES # BLD AUTO: 0.32 K/UL
MONOCYTES NFR BLD AUTO: 5 %
NEUTROPHILS # BLD AUTO: 3.88 K/UL
NEUTROPHILS NFR BLD AUTO: 60.8 %
PLATELET # BLD AUTO: 276 K/UL
POTASSIUM SERPL-SCNC: 4.1 MMOL/L
PROT SERPL-MCNC: 6.7 G/DL
RBC # BLD: 4.42 M/UL
RBC # FLD: 16.5 %
SODIUM SERPL-SCNC: 140 MMOL/L
WBC # FLD AUTO: 6.38 K/UL

## 2024-09-23 ENCOUNTER — NON-APPOINTMENT (OUTPATIENT)
Age: 59
End: 2024-09-23

## 2024-09-23 DIAGNOSIS — B96.89 ACUTE VAGINITIS: ICD-10-CM

## 2024-09-23 DIAGNOSIS — N76.0 ACUTE VAGINITIS: ICD-10-CM

## 2024-09-23 LAB
A VAGINAE DNA VAG QL NAA+PROBE: ABNORMAL
BACTERIA UR CULT: NORMAL
BVAB2 DNA VAG QL NAA+PROBE: ABNORMAL
C KRUSEI DNA VAG QL NAA+PROBE: NEGATIVE
C TRACH RRNA SPEC QL NAA+PROBE: NEGATIVE
CANDIDA DNA VAG QL NAA+PROBE: NEGATIVE
MEGA1 DNA VAG QL NAA+PROBE: NORMAL
N GONORRHOEA RRNA SPEC QL NAA+PROBE: NEGATIVE
T VAGINALIS RRNA SPEC QL NAA+PROBE: NEGATIVE

## 2024-09-23 RX ORDER — METRONIDAZOLE 500 MG/1
500 TABLET ORAL TWICE DAILY
Qty: 14 | Refills: 0 | Status: ACTIVE | COMMUNITY
Start: 2024-09-23 | End: 1900-01-01

## 2024-09-25 ENCOUNTER — OUTPATIENT (OUTPATIENT)
Dept: OUTPATIENT SERVICES | Facility: HOSPITAL | Age: 59
LOS: 1 days | End: 2024-09-25
Payer: MEDICAID

## 2024-09-25 ENCOUNTER — APPOINTMENT (OUTPATIENT)
Dept: ULTRASOUND IMAGING | Facility: CLINIC | Age: 59
End: 2024-09-25
Payer: MEDICAID

## 2024-09-25 DIAGNOSIS — Z98.890 OTHER SPECIFIED POSTPROCEDURAL STATES: Chronic | ICD-10-CM

## 2024-09-25 DIAGNOSIS — Z00.8 ENCOUNTER FOR OTHER GENERAL EXAMINATION: ICD-10-CM

## 2024-09-25 DIAGNOSIS — K43.9 VENTRAL HERNIA WITHOUT OBSTRUCTION OR GANGRENE: Chronic | ICD-10-CM

## 2024-09-25 DIAGNOSIS — Z90.49 ACQUIRED ABSENCE OF OTHER SPECIFIED PARTS OF DIGESTIVE TRACT: Chronic | ICD-10-CM

## 2024-09-25 PROCEDURE — 76830 TRANSVAGINAL US NON-OB: CPT

## 2024-09-25 PROCEDURE — 76830 TRANSVAGINAL US NON-OB: CPT | Mod: 26

## 2024-09-26 ENCOUNTER — RX RENEWAL (OUTPATIENT)
Age: 59
End: 2024-09-26

## 2024-09-30 ENCOUNTER — NON-APPOINTMENT (OUTPATIENT)
Age: 59
End: 2024-09-30

## 2024-10-04 ENCOUNTER — NON-APPOINTMENT (OUTPATIENT)
Age: 59
End: 2024-10-04

## 2024-10-04 ENCOUNTER — APPOINTMENT (OUTPATIENT)
Dept: RHEUMATOLOGY | Facility: CLINIC | Age: 59
End: 2024-10-04
Payer: MEDICAID

## 2024-10-04 VITALS
TEMPERATURE: 97 F | HEART RATE: 75 BPM | DIASTOLIC BLOOD PRESSURE: 69 MMHG | SYSTOLIC BLOOD PRESSURE: 144 MMHG | OXYGEN SATURATION: 99 %

## 2024-10-04 PROCEDURE — 96413 CHEMO IV INFUSION 1 HR: CPT

## 2024-10-04 RX ORDER — ABATACEPT 250 MG/15ML
250 INJECTION, POWDER, LYOPHILIZED, FOR SOLUTION INTRAVENOUS
Qty: 0 | Refills: 0 | Status: COMPLETED
Start: 2024-03-11

## 2024-10-04 NOTE — HISTORY OF PRESENT ILLNESS
[Denies] : Denies [No] : No [Yes] : Yes [Declined] : Declined [Informed consent documented in EHR.] : Informed consent documented in EHR. [Left upper extremity] : Left upper extremity [24g] : 24g [Start Time: ___] : Medication Start Time: [unfilled] [End Time: ___] : Medication End Time: [unfilled] [Medication Name: ___] : Medication Name: [unfilled] [Total Amount Administered: ___] : Total Amount Administered: [unfilled] [IV discontinued. Intact. No signs or symptoms of IV complications noted. Time: ___] : IV discontinued. Intact. No signs or symptoms of IV complications noted. Time: [unfilled] [Patient  instructed to seek medical attention with signs and symptoms of adverse effects] : Patient  instructed to seek medical attention with signs and symptoms of adverse effects [Patient left unit in no acute distress] : Patient left unit in no acute distress [Medications administered as ordered and tolerated well.] : Medications administered as ordered and tolerated well. [de-identified] : Left AC [de-identified] : [id number]

## 2024-10-31 ENCOUNTER — APPOINTMENT (OUTPATIENT)
Dept: RHEUMATOLOGY | Facility: CLINIC | Age: 59
End: 2024-10-31
Payer: MEDICAID

## 2024-10-31 VITALS
SYSTOLIC BLOOD PRESSURE: 144 MMHG | DIASTOLIC BLOOD PRESSURE: 78 MMHG | OXYGEN SATURATION: 98 % | HEART RATE: 76 BPM | TEMPERATURE: 98 F

## 2024-10-31 PROCEDURE — 96413 CHEMO IV INFUSION 1 HR: CPT

## 2024-10-31 RX ORDER — ABATACEPT 250 MG/15ML
250 INJECTION, POWDER, LYOPHILIZED, FOR SOLUTION INTRAVENOUS
Qty: 0 | Refills: 0 | Status: COMPLETED
Start: 2024-03-11

## 2024-11-26 ENCOUNTER — APPOINTMENT (OUTPATIENT)
Dept: RHEUMATOLOGY | Facility: CLINIC | Age: 59
End: 2024-11-26
Payer: MEDICAID

## 2024-11-26 VITALS
WEIGHT: 200 LBS | OXYGEN SATURATION: 98 % | BODY MASS INDEX: 33.28 KG/M2 | SYSTOLIC BLOOD PRESSURE: 137 MMHG | RESPIRATION RATE: 17 BRPM | TEMPERATURE: 98 F | DIASTOLIC BLOOD PRESSURE: 89 MMHG | HEART RATE: 62 BPM

## 2024-11-26 DIAGNOSIS — M25.561 PAIN IN RIGHT KNEE: ICD-10-CM

## 2024-11-26 DIAGNOSIS — M35.00 SICCA SYNDROME, UNSPECIFIED: ICD-10-CM

## 2024-11-26 DIAGNOSIS — E55.9 VITAMIN D DEFICIENCY, UNSPECIFIED: ICD-10-CM

## 2024-11-26 DIAGNOSIS — M06.9 RHEUMATOID ARTHRITIS, UNSPECIFIED: ICD-10-CM

## 2024-11-26 DIAGNOSIS — M79.7 FIBROMYALGIA: ICD-10-CM

## 2024-11-26 PROCEDURE — ZZZZZ: CPT

## 2024-11-26 PROCEDURE — 96413 CHEMO IV INFUSION 1 HR: CPT

## 2024-11-26 PROCEDURE — 99214 OFFICE O/P EST MOD 30 MIN: CPT | Mod: 25

## 2024-11-26 RX ORDER — ABATACEPT 250 MG/15ML
250 INJECTION, POWDER, LYOPHILIZED, FOR SOLUTION INTRAVENOUS
Qty: 0 | Refills: 0 | Status: COMPLETED
Start: 2024-03-11

## 2024-12-11 ENCOUNTER — RX RENEWAL (OUTPATIENT)
Age: 59
End: 2024-12-11

## 2024-12-24 ENCOUNTER — APPOINTMENT (OUTPATIENT)
Dept: RHEUMATOLOGY | Facility: CLINIC | Age: 59
End: 2024-12-24
Payer: MEDICAID

## 2024-12-24 VITALS
RESPIRATION RATE: 16 BRPM | OXYGEN SATURATION: 98 % | DIASTOLIC BLOOD PRESSURE: 83 MMHG | HEART RATE: 64 BPM | SYSTOLIC BLOOD PRESSURE: 143 MMHG

## 2024-12-24 PROCEDURE — 96413 CHEMO IV INFUSION 1 HR: CPT

## 2024-12-24 RX ORDER — ABATACEPT 250 MG/15ML
250 INJECTION, POWDER, LYOPHILIZED, FOR SOLUTION INTRAVENOUS
Qty: 0 | Refills: 0 | Status: COMPLETED
Start: 2024-03-11

## 2025-01-06 ENCOUNTER — OUTPATIENT (OUTPATIENT)
Dept: OUTPATIENT SERVICES | Facility: HOSPITAL | Age: 60
LOS: 1 days | End: 2025-01-06
Payer: SELF-PAY

## 2025-01-06 ENCOUNTER — APPOINTMENT (OUTPATIENT)
Dept: CT IMAGING | Facility: CLINIC | Age: 60
End: 2025-01-06
Payer: SELF-PAY

## 2025-01-06 DIAGNOSIS — K43.9 VENTRAL HERNIA WITHOUT OBSTRUCTION OR GANGRENE: Chronic | ICD-10-CM

## 2025-01-06 DIAGNOSIS — Z98.890 OTHER SPECIFIED POSTPROCEDURAL STATES: Chronic | ICD-10-CM

## 2025-01-06 DIAGNOSIS — Z00.8 ENCOUNTER FOR OTHER GENERAL EXAMINATION: ICD-10-CM

## 2025-01-06 DIAGNOSIS — Z90.49 ACQUIRED ABSENCE OF OTHER SPECIFIED PARTS OF DIGESTIVE TRACT: Chronic | ICD-10-CM

## 2025-01-06 PROCEDURE — 75571 CT HRT W/O DYE W/CA TEST: CPT | Mod: 26

## 2025-01-06 PROCEDURE — 75571 CT HRT W/O DYE W/CA TEST: CPT

## 2025-01-22 ENCOUNTER — APPOINTMENT (OUTPATIENT)
Dept: RHEUMATOLOGY | Facility: CLINIC | Age: 60
End: 2025-01-22

## 2025-01-27 ENCOUNTER — RX RENEWAL (OUTPATIENT)
Age: 60
End: 2025-01-27

## 2025-03-07 ENCOUNTER — RX RENEWAL (OUTPATIENT)
Age: 60
End: 2025-03-07

## 2025-03-19 ENCOUNTER — APPOINTMENT (OUTPATIENT)
Dept: RHEUMATOLOGY | Facility: CLINIC | Age: 60
End: 2025-03-19
Payer: MEDICAID

## 2025-03-19 ENCOUNTER — NON-APPOINTMENT (OUTPATIENT)
Age: 60
End: 2025-03-19

## 2025-03-19 VITALS
WEIGHT: 191 LBS | OXYGEN SATURATION: 99 % | HEART RATE: 69 BPM | DIASTOLIC BLOOD PRESSURE: 76 MMHG | SYSTOLIC BLOOD PRESSURE: 118 MMHG | BODY MASS INDEX: 31.82 KG/M2 | TEMPERATURE: 97.1 F | HEIGHT: 65 IN

## 2025-03-19 VITALS
DIASTOLIC BLOOD PRESSURE: 99 MMHG | TEMPERATURE: 98 F | SYSTOLIC BLOOD PRESSURE: 102 MMHG | RESPIRATION RATE: 17 BRPM | OXYGEN SATURATION: 98 % | HEART RATE: 62 BPM

## 2025-03-19 DIAGNOSIS — M06.9 RHEUMATOID ARTHRITIS, UNSPECIFIED: ICD-10-CM

## 2025-03-19 DIAGNOSIS — E55.9 VITAMIN D DEFICIENCY, UNSPECIFIED: ICD-10-CM

## 2025-03-19 DIAGNOSIS — M35.00 SICCA SYNDROME, UNSPECIFIED: ICD-10-CM

## 2025-03-19 DIAGNOSIS — M79.7 FIBROMYALGIA: ICD-10-CM

## 2025-03-19 PROCEDURE — 96413 CHEMO IV INFUSION 1 HR: CPT

## 2025-03-19 PROCEDURE — 99214 OFFICE O/P EST MOD 30 MIN: CPT | Mod: 25

## 2025-03-19 PROCEDURE — ZZZZZ: CPT

## 2025-03-19 RX ORDER — ABATACEPT 250 MG/15ML
250 INJECTION, POWDER, LYOPHILIZED, FOR SOLUTION INTRAVENOUS
Qty: 0 | Refills: 0 | Status: COMPLETED
Start: 2024-03-11

## 2025-03-19 RX ORDER — GABAPENTIN 800 MG/1
800 TABLET, COATED ORAL
Qty: 90 | Refills: 0 | Status: ACTIVE | COMMUNITY
Start: 2025-03-19 | End: 1900-01-01

## 2025-04-16 ENCOUNTER — APPOINTMENT (OUTPATIENT)
Dept: RHEUMATOLOGY | Facility: CLINIC | Age: 60
End: 2025-04-16
Payer: MEDICAID

## 2025-04-16 VITALS
TEMPERATURE: 97.8 F | HEART RATE: 76 BPM | OXYGEN SATURATION: 97 % | SYSTOLIC BLOOD PRESSURE: 124 MMHG | DIASTOLIC BLOOD PRESSURE: 79 MMHG

## 2025-04-16 PROCEDURE — 96413 CHEMO IV INFUSION 1 HR: CPT

## 2025-04-16 RX ORDER — ABATACEPT 250 MG/15ML
250 INJECTION, POWDER, LYOPHILIZED, FOR SOLUTION INTRAVENOUS
Qty: 0 | Refills: 0 | Status: COMPLETED | OUTPATIENT
Start: 2025-04-11

## 2025-05-09 RX ORDER — ABATACEPT 250 MG/15ML
250 INJECTION, POWDER, LYOPHILIZED, FOR SOLUTION INTRAVENOUS
Refills: 0 | Status: ACTIVE | OUTPATIENT
Start: 2025-05-08 | End: 1900-01-01

## 2025-05-14 ENCOUNTER — APPOINTMENT (OUTPATIENT)
Dept: RHEUMATOLOGY | Facility: CLINIC | Age: 60
End: 2025-05-14
Payer: MEDICAID

## 2025-05-14 VITALS
TEMPERATURE: 97.8 F | DIASTOLIC BLOOD PRESSURE: 82 MMHG | OXYGEN SATURATION: 98 % | SYSTOLIC BLOOD PRESSURE: 127 MMHG | HEART RATE: 66 BPM

## 2025-05-14 PROCEDURE — 96413 CHEMO IV INFUSION 1 HR: CPT

## 2025-05-14 RX ORDER — ABATACEPT 250 MG/15ML
250 INJECTION, POWDER, LYOPHILIZED, FOR SOLUTION INTRAVENOUS
Qty: 0 | Refills: 0 | Status: COMPLETED
Start: 2025-05-08

## 2025-06-16 ENCOUNTER — RX RENEWAL (OUTPATIENT)
Age: 60
End: 2025-06-16

## 2025-06-18 ENCOUNTER — APPOINTMENT (OUTPATIENT)
Dept: RHEUMATOLOGY | Facility: CLINIC | Age: 60
End: 2025-06-18
Payer: MEDICAID

## 2025-06-18 VITALS
HEIGHT: 65 IN | DIASTOLIC BLOOD PRESSURE: 80 MMHG | SYSTOLIC BLOOD PRESSURE: 118 MMHG | TEMPERATURE: 98.8 F | BODY MASS INDEX: 30.82 KG/M2 | HEART RATE: 65 BPM | OXYGEN SATURATION: 98 % | WEIGHT: 185 LBS

## 2025-06-18 PROBLEM — L23.7 POISON IVY: Status: ACTIVE | Noted: 2025-06-18

## 2025-06-18 PROCEDURE — 99215 OFFICE O/P EST HI 40 MIN: CPT

## 2025-06-18 RX ORDER — METHYLPREDNISOLONE 4 MG/1
4 TABLET ORAL
Qty: 1 | Refills: 0 | Status: ACTIVE | COMMUNITY
Start: 2025-06-18 | End: 1900-01-01

## 2025-06-20 ENCOUNTER — APPOINTMENT (OUTPATIENT)
Dept: RHEUMATOLOGY | Facility: CLINIC | Age: 60
End: 2025-06-20
Payer: MEDICAID

## 2025-06-20 VITALS — SYSTOLIC BLOOD PRESSURE: 150 MMHG | HEART RATE: 52 BPM | OXYGEN SATURATION: 97 % | DIASTOLIC BLOOD PRESSURE: 78 MMHG

## 2025-06-20 VITALS — HEART RATE: 54 BPM | SYSTOLIC BLOOD PRESSURE: 154 MMHG | DIASTOLIC BLOOD PRESSURE: 87 MMHG | OXYGEN SATURATION: 98 %

## 2025-06-20 PROCEDURE — 96413 CHEMO IV INFUSION 1 HR: CPT

## 2025-06-20 RX ORDER — ABATACEPT 250 MG/15ML
250 INJECTION, POWDER, LYOPHILIZED, FOR SOLUTION INTRAVENOUS
Qty: 0 | Refills: 0 | Status: COMPLETED
Start: 2025-05-08

## 2025-07-16 ENCOUNTER — RX RENEWAL (OUTPATIENT)
Age: 60
End: 2025-07-16

## 2025-07-18 ENCOUNTER — APPOINTMENT (OUTPATIENT)
Dept: RHEUMATOLOGY | Facility: CLINIC | Age: 60
End: 2025-07-18
Payer: MEDICAID

## 2025-07-18 PROCEDURE — 96365 THER/PROPH/DIAG IV INF INIT: CPT

## 2025-07-18 PROCEDURE — 96413 CHEMO IV INFUSION 1 HR: CPT

## 2025-07-18 RX ORDER — ABATACEPT 250 MG/15ML
250 INJECTION, POWDER, LYOPHILIZED, FOR SOLUTION INTRAVENOUS
Qty: 0 | Refills: 0 | Status: COMPLETED
Start: 2025-05-08

## 2025-07-22 NOTE — OCCUPATIONAL THERAPY INITIAL EVALUATION ADULT - ASSISTIVE DEVICE FOR TRANSFER: STAND/SIT, REHAB EVAL
ALESHIA CM reviewed patient's chart. CMOC assisted patient with completing [employer] application. Patient and CMOC awaiting status update on patient's application.  
rolling walker

## 2025-08-14 ENCOUNTER — APPOINTMENT (OUTPATIENT)
Dept: RHEUMATOLOGY | Facility: CLINIC | Age: 60
End: 2025-08-14
Payer: MEDICAID

## 2025-08-14 VITALS
DIASTOLIC BLOOD PRESSURE: 80 MMHG | HEART RATE: 58 BPM | RESPIRATION RATE: 17 BRPM | SYSTOLIC BLOOD PRESSURE: 139 MMHG | OXYGEN SATURATION: 96 %

## 2025-08-14 VITALS
DIASTOLIC BLOOD PRESSURE: 84 MMHG | RESPIRATION RATE: 18 BRPM | SYSTOLIC BLOOD PRESSURE: 130 MMHG | TEMPERATURE: 97 F | OXYGEN SATURATION: 98 % | HEART RATE: 64 BPM

## 2025-08-14 PROCEDURE — 96413 CHEMO IV INFUSION 1 HR: CPT

## 2025-08-14 RX ORDER — ABATACEPT 250 MG/15ML
250 INJECTION, POWDER, LYOPHILIZED, FOR SOLUTION INTRAVENOUS
Qty: 0 | Refills: 0 | Status: COMPLETED
Start: 2025-05-08

## 2025-08-14 RX ORDER — METOPROLOL TARTRATE 25 MG/1
25 TABLET ORAL DAILY
Refills: 0 | Status: ACTIVE | COMMUNITY
Start: 2025-08-14

## 2025-08-19 RX ORDER — METHOTREXATE 25 MG/ML
50 INJECTION INTRA-ARTERIAL; INTRAMUSCULAR; INTRATHECAL; INTRAVENOUS
Qty: 4 | Refills: 1 | Status: ACTIVE | COMMUNITY
Start: 2025-08-19 | End: 1900-01-01

## 2025-08-19 RX ORDER — SYRINGE,NEEDLE,INSULN,SAFE,1ML 29 G X1/2"
29G X 1/2" SYRINGE, EMPTY DISPOSABLE MISCELLANEOUS
Qty: 12 | Refills: 1 | Status: ACTIVE | COMMUNITY
Start: 2025-08-19 | End: 1900-01-01

## 2025-09-17 ENCOUNTER — APPOINTMENT (OUTPATIENT)
Dept: RHEUMATOLOGY | Facility: CLINIC | Age: 60
End: 2025-09-17

## 2025-09-17 ENCOUNTER — APPOINTMENT (OUTPATIENT)
Dept: RHEUMATOLOGY | Facility: CLINIC | Age: 60
End: 2025-09-17
Payer: MEDICAID

## 2025-09-17 VITALS
SYSTOLIC BLOOD PRESSURE: 130 MMHG | TEMPERATURE: 97.2 F | OXYGEN SATURATION: 98 % | BODY MASS INDEX: 31.65 KG/M2 | HEIGHT: 65 IN | DIASTOLIC BLOOD PRESSURE: 90 MMHG | WEIGHT: 190 LBS | HEART RATE: 60 BPM

## 2025-09-17 VITALS
SYSTOLIC BLOOD PRESSURE: 171 MMHG | HEART RATE: 57 BPM | RESPIRATION RATE: 18 BRPM | DIASTOLIC BLOOD PRESSURE: 102 MMHG | OXYGEN SATURATION: 100 %

## 2025-09-17 DIAGNOSIS — M85.80 OTHER SPECIFIED DISORDERS OF BONE DENSITY AND STRUCTURE, UNSPECIFIED SITE: ICD-10-CM

## 2025-09-17 DIAGNOSIS — M54.50 LOW BACK PAIN, UNSPECIFIED: ICD-10-CM

## 2025-09-17 DIAGNOSIS — M25.511 PAIN IN RIGHT SHOULDER: ICD-10-CM

## 2025-09-17 DIAGNOSIS — M19.049 PRIMARY OSTEOARTHRITIS, UNSPECIFIED HAND: ICD-10-CM

## 2025-09-17 DIAGNOSIS — M06.9 RHEUMATOID ARTHRITIS, UNSPECIFIED: ICD-10-CM

## 2025-09-17 DIAGNOSIS — M79.7 FIBROMYALGIA: ICD-10-CM

## 2025-09-17 DIAGNOSIS — K13.79 OTHER LESIONS OF ORAL MUCOSA: ICD-10-CM

## 2025-09-17 DIAGNOSIS — M35.00 SICCA SYNDROME, UNSPECIFIED: ICD-10-CM

## 2025-09-17 PROCEDURE — 96413 CHEMO IV INFUSION 1 HR: CPT

## 2025-09-17 PROCEDURE — 99214 OFFICE O/P EST MOD 30 MIN: CPT | Mod: 25

## 2025-09-17 RX ORDER — ABATACEPT 250 MG/15ML
250 INJECTION, POWDER, LYOPHILIZED, FOR SOLUTION INTRAVENOUS
Qty: 0 | Refills: 0 | Status: COMPLETED
Start: 2025-05-08

## 2025-09-18 RX ORDER — METHOTREXATE 20 MG/.4ML
20 INJECTION, SOLUTION SUBCUTANEOUS
Qty: 1 | Refills: 3 | Status: ACTIVE | COMMUNITY
Start: 2025-09-18 | End: 1900-01-01